# Patient Record
Sex: FEMALE | Race: WHITE | NOT HISPANIC OR LATINO | Employment: UNEMPLOYED | URBAN - METROPOLITAN AREA
[De-identification: names, ages, dates, MRNs, and addresses within clinical notes are randomized per-mention and may not be internally consistent; named-entity substitution may affect disease eponyms.]

---

## 2017-06-26 ENCOUNTER — TRANSCRIBE ORDERS (OUTPATIENT)
Dept: ADMINISTRATIVE | Facility: HOSPITAL | Age: 67
End: 2017-06-26

## 2017-06-26 DIAGNOSIS — M54.16 LUMBAR RADICULOPATHY: Primary | ICD-10-CM

## 2017-06-30 ENCOUNTER — HOSPITAL ENCOUNTER (OUTPATIENT)
Dept: RADIOLOGY | Facility: HOSPITAL | Age: 67
Discharge: HOME | End: 2017-06-30
Attending: INTERNAL MEDICINE
Payer: MEDICARE

## 2017-06-30 DIAGNOSIS — M54.16 LUMBAR RADICULOPATHY: ICD-10-CM

## 2017-06-30 PROCEDURE — 72148 MRI LUMBAR SPINE W/O DYE: CPT

## 2017-08-14 ENCOUNTER — TRANSCRIBE ORDERS (OUTPATIENT)
Dept: ADMINISTRATIVE | Facility: HOSPITAL | Age: 67
End: 2017-08-14

## 2017-08-14 ENCOUNTER — APPOINTMENT (OUTPATIENT)
Dept: LAB | Facility: HOSPITAL | Age: 67
End: 2017-08-14
Attending: INTERNAL MEDICINE
Payer: MEDICARE

## 2017-08-14 DIAGNOSIS — N39.0 URINARY TRACT INFECTION, SITE NOT SPECIFIED: Primary | ICD-10-CM

## 2017-08-14 LAB
BACTERIA UR QL AUTO: ABNORMAL /HPF
BILIRUB UR QL STRIP: NEGATIVE
CLARITY UR: CLEAR
COLOR UR: YELLOW
GLUCOSE UR STRIP-MCNC: NEGATIVE MG/DL
HGB UR QL STRIP.AUTO: ABNORMAL
KETONES UR STRIP-MCNC: NEGATIVE MG/DL
LEUKOCYTE ESTERASE UR QL STRIP: ABNORMAL
NITRITE UR QL STRIP: NEGATIVE
NON-SQ EPI CELLS URNS QL MICRO: ABNORMAL /HPF
PH UR STRIP.AUTO: 5.5 [PH] (ref 5–9)
PROT UR STRIP-MCNC: NEGATIVE MG/DL
RBC #/AREA URNS AUTO: ABNORMAL /HPF
SP GR UR STRIP.AUTO: <=1.005 (ref 1–1.03)
UROBILINOGEN UR QL STRIP.AUTO: 0.2 E.U./DL
WBC #/AREA URNS AUTO: ABNORMAL /HPF

## 2017-08-14 PROCEDURE — 81001 URINALYSIS AUTO W/SCOPE: CPT | Performed by: INTERNAL MEDICINE

## 2018-04-12 ENCOUNTER — HOSPITAL ENCOUNTER (OUTPATIENT)
Dept: RADIOLOGY | Facility: HOSPITAL | Age: 68
Discharge: HOME | End: 2018-04-12
Attending: INTERNAL MEDICINE
Payer: MEDICARE

## 2018-04-12 ENCOUNTER — TRANSCRIBE ORDERS (OUTPATIENT)
Dept: ADMINISTRATIVE | Facility: HOSPITAL | Age: 68
End: 2018-04-12

## 2018-04-12 DIAGNOSIS — M25.519 ACUTE SHOULDER PAIN, UNSPECIFIED LATERALITY: Primary | ICD-10-CM

## 2018-04-12 PROCEDURE — 73030 X-RAY EXAM OF SHOULDER: CPT

## 2018-11-08 ENCOUNTER — TRANSCRIBE ORDERS (OUTPATIENT)
Dept: ADMINISTRATIVE | Facility: HOSPITAL | Age: 68
End: 2018-11-08

## 2018-11-08 DIAGNOSIS — R52 PAIN: Primary | ICD-10-CM

## 2018-11-10 ENCOUNTER — HOSPITAL ENCOUNTER (OUTPATIENT)
Dept: RADIOLOGY | Facility: HOSPITAL | Age: 68
Discharge: HOME | End: 2018-11-10
Attending: INTERNAL MEDICINE
Payer: MEDICARE

## 2018-11-10 DIAGNOSIS — R52 PAIN: ICD-10-CM

## 2018-11-10 PROCEDURE — 76705 ECHO EXAM OF ABDOMEN: CPT

## 2018-12-05 ENCOUNTER — TRANSCRIBE ORDERS (OUTPATIENT)
Dept: ADMINISTRATIVE | Facility: HOSPITAL | Age: 68
End: 2018-12-05

## 2018-12-05 DIAGNOSIS — R10.11 RUQ PAIN: Primary | ICD-10-CM

## 2018-12-11 ENCOUNTER — HOSPITAL ENCOUNTER (OUTPATIENT)
Dept: RADIOLOGY | Facility: HOSPITAL | Age: 68
Discharge: HOME | End: 2018-12-11
Attending: INTERNAL MEDICINE
Payer: MEDICARE

## 2018-12-11 VITALS — WEIGHT: 213 LBS

## 2018-12-11 DIAGNOSIS — R10.11 RUQ PAIN: ICD-10-CM

## 2018-12-11 PROCEDURE — 78227 HEPATOBIL SYST IMAGE W/DRUG: CPT

## 2018-12-11 PROCEDURE — A9537 TC99M MEBROFENIN: HCPCS

## 2019-05-28 ENCOUNTER — TRANSCRIBE ORDERS (OUTPATIENT)
Dept: ADMINISTRATIVE | Facility: HOSPITAL | Age: 69
End: 2019-05-28

## 2019-05-28 DIAGNOSIS — N28.9 RENAL INSUFFICIENCY: ICD-10-CM

## 2019-05-28 DIAGNOSIS — R79.89 ELEVATED SERUM CREATININE: Primary | ICD-10-CM

## 2019-05-30 ENCOUNTER — HOSPITAL ENCOUNTER (OUTPATIENT)
Dept: RADIOLOGY | Facility: HOSPITAL | Age: 69
Discharge: HOME | End: 2019-05-30
Attending: INTERNAL MEDICINE
Payer: MEDICARE

## 2019-05-30 DIAGNOSIS — N28.9 RENAL INSUFFICIENCY: ICD-10-CM

## 2019-05-30 DIAGNOSIS — R79.89 ELEVATED SERUM CREATININE: ICD-10-CM

## 2019-05-30 PROCEDURE — 76770 US EXAM ABDO BACK WALL COMP: CPT

## 2021-03-30 DIAGNOSIS — Z23 ENCOUNTER FOR IMMUNIZATION: ICD-10-CM

## 2021-04-16 ENCOUNTER — IMMUNIZATIONS (OUTPATIENT)
Dept: FAMILY MEDICINE CLINIC | Facility: HOSPITAL | Age: 71
End: 2021-04-16

## 2021-04-16 DIAGNOSIS — Z23 ENCOUNTER FOR IMMUNIZATION: Primary | ICD-10-CM

## 2021-04-16 PROCEDURE — 0011A SARS-COV-2 / COVID-19 MRNA VACCINE (MODERNA) 100 MCG: CPT

## 2021-04-16 PROCEDURE — 91301 SARS-COV-2 / COVID-19 MRNA VACCINE (MODERNA) 100 MCG: CPT

## 2021-05-18 ENCOUNTER — IMMUNIZATIONS (OUTPATIENT)
Dept: FAMILY MEDICINE CLINIC | Facility: HOSPITAL | Age: 71
End: 2021-05-18

## 2021-05-18 DIAGNOSIS — Z23 ENCOUNTER FOR IMMUNIZATION: Primary | ICD-10-CM

## 2021-05-18 PROCEDURE — 0012A SARS-COV-2 / COVID-19 MRNA VACCINE (MODERNA) 100 MCG: CPT

## 2021-05-18 PROCEDURE — 91301 SARS-COV-2 / COVID-19 MRNA VACCINE (MODERNA) 100 MCG: CPT

## 2022-09-06 ENCOUNTER — RA CDI HCC (OUTPATIENT)
Dept: OTHER | Facility: HOSPITAL | Age: 72
End: 2022-09-06

## 2022-09-06 NOTE — PROGRESS NOTES
[address]  coding opportunities       Chart reviewed, no opportunity found: CHART REVIEWED, NO OPPORTUNITY FOUND        Patients Insurance     Medicare Insurance: Medicare

## 2022-09-16 ENCOUNTER — OFFICE VISIT (OUTPATIENT)
Dept: FAMILY MEDICINE CLINIC | Facility: CLINIC | Age: 72
End: 2022-09-16
Payer: MEDICARE

## 2022-09-16 VITALS
OXYGEN SATURATION: 95 % | HEIGHT: 65 IN | WEIGHT: 230 LBS | BODY MASS INDEX: 38.32 KG/M2 | HEART RATE: 76 BPM | SYSTOLIC BLOOD PRESSURE: 130 MMHG | DIASTOLIC BLOOD PRESSURE: 74 MMHG

## 2022-09-16 DIAGNOSIS — T78.40XD ALLERGY, SUBSEQUENT ENCOUNTER: ICD-10-CM

## 2022-09-16 DIAGNOSIS — G40.909 SEIZURE DISORDER (HCC): ICD-10-CM

## 2022-09-16 DIAGNOSIS — K21.9 GASTROESOPHAGEAL REFLUX DISEASE WITHOUT ESOPHAGITIS: Primary | ICD-10-CM

## 2022-09-16 DIAGNOSIS — M25.512 ACUTE PAIN OF LEFT SHOULDER: ICD-10-CM

## 2022-09-16 DIAGNOSIS — E03.9 ACQUIRED HYPOTHYROIDISM: ICD-10-CM

## 2022-09-16 DIAGNOSIS — F41.9 ANXIETY: ICD-10-CM

## 2022-09-16 DIAGNOSIS — G89.4 CHRONIC PAIN SYNDROME: ICD-10-CM

## 2022-09-16 PROBLEM — M79.7 FIBROMYALGIA: Status: ACTIVE | Noted: 2022-09-16

## 2022-09-16 PROBLEM — T78.40XA ALLERGIES: Status: ACTIVE | Noted: 2022-09-16

## 2022-09-16 PROCEDURE — 99214 OFFICE O/P EST MOD 30 MIN: CPT | Performed by: INTERNAL MEDICINE

## 2022-09-16 RX ORDER — PANTOPRAZOLE SODIUM 40 MG/1
40 TABLET, DELAYED RELEASE ORAL DAILY
Qty: 90 TABLET | Refills: 1 | Status: SHIPPED | OUTPATIENT
Start: 2022-09-16

## 2022-09-16 RX ORDER — CHOLECALCIFEROL (VITAMIN D3) 125 MCG
2000 CAPSULE ORAL DAILY
COMMUNITY
Start: 1999-05-07

## 2022-09-16 RX ORDER — LEVETIRACETAM 500 MG/1
500 TABLET ORAL 2 TIMES DAILY
Qty: 180 TABLET | Refills: 1 | Status: SHIPPED | OUTPATIENT
Start: 2022-09-16

## 2022-09-16 RX ORDER — LEVOTHYROXINE SODIUM 112 UG/1
112 TABLET ORAL DAILY
Qty: 90 TABLET | Refills: 1 | Status: SHIPPED | OUTPATIENT
Start: 2022-09-16

## 2022-09-16 RX ORDER — PANTOPRAZOLE SODIUM 40 MG/1
40 TABLET, DELAYED RELEASE ORAL DAILY
COMMUNITY
End: 2022-09-16 | Stop reason: SDUPTHER

## 2022-09-16 RX ORDER — HYDROCODONE BITARTRATE AND ACETAMINOPHEN 5; 325 MG/1; MG/1
1 TABLET ORAL 2 TIMES DAILY PRN
Qty: 60 TABLET | Refills: 0 | Status: SHIPPED | OUTPATIENT
Start: 2022-09-16 | End: 2022-10-18 | Stop reason: SDUPTHER

## 2022-09-16 RX ORDER — HYDROCODONE BITARTRATE AND ACETAMINOPHEN 5; 325 MG/1; MG/1
1 TABLET ORAL 2 TIMES DAILY PRN
COMMUNITY
Start: 2022-09-06 | End: 2022-09-16 | Stop reason: SDUPTHER

## 2022-09-16 RX ORDER — LEVOTHYROXINE SODIUM 112 UG/1
112 TABLET ORAL DAILY
COMMUNITY
Start: 2020-09-12 | End: 2022-09-16 | Stop reason: SDUPTHER

## 2022-09-16 RX ORDER — FLUTICASONE PROPIONATE 50 MCG
2 SPRAY, SUSPENSION (ML) NASAL DAILY
Qty: 15.8 ML | Refills: 1 | Status: SHIPPED | OUTPATIENT
Start: 2022-09-16

## 2022-09-16 RX ORDER — FLUTICASONE PROPIONATE 50 MCG
1 SPRAY, SUSPENSION (ML) NASAL DAILY
COMMUNITY
End: 2022-09-16 | Stop reason: SDUPTHER

## 2022-09-16 RX ORDER — MONTELUKAST SODIUM 10 MG/1
10 TABLET ORAL DAILY
Qty: 90 TABLET | Refills: 1 | Status: SHIPPED | OUTPATIENT
Start: 2022-09-16

## 2022-09-16 RX ORDER — MONTELUKAST SODIUM 10 MG/1
10 TABLET ORAL DAILY
COMMUNITY
Start: 1999-08-06 | End: 2022-09-16 | Stop reason: SDUPTHER

## 2022-09-16 RX ORDER — DULOXETIN HYDROCHLORIDE 30 MG/1
30 CAPSULE, DELAYED RELEASE ORAL DAILY
COMMUNITY
Start: 2022-06-21 | End: 2022-09-16 | Stop reason: SDUPTHER

## 2022-09-16 RX ORDER — LEVETIRACETAM 500 MG/1
500 TABLET ORAL 2 TIMES DAILY
COMMUNITY
Start: 1999-06-01 | End: 2022-09-16 | Stop reason: SDUPTHER

## 2022-09-16 RX ORDER — DULOXETIN HYDROCHLORIDE 30 MG/1
30 CAPSULE, DELAYED RELEASE ORAL DAILY
Qty: 90 CAPSULE | Refills: 1 | Status: SHIPPED | OUTPATIENT
Start: 2022-09-16

## 2022-09-16 NOTE — ASSESSMENT & PLAN NOTE
Patient has hypothyroidism on levothyroxine 112 mcg daily in the morning    Her last TSH is in the normal range will continue with the same dose for now

## 2022-09-16 NOTE — ASSESSMENT & PLAN NOTE
History of seizure disorder on Keppra 500 mg twice a day    No episodes of seizures in the recent past   Will continue with the same

## 2022-09-16 NOTE — ASSESSMENT & PLAN NOTE
Patient with chronic pain low back area  Was seen by the spine surgeons in the past had received steroid injections also  Currently on pain medication Norco twice a day p r n  for severe pain    Will continue with the same

## 2022-09-16 NOTE — ASSESSMENT & PLAN NOTE
Patient with allergy symptoms currently on Singulair 10 mg daily and Flonase 2 sprays each nostril once a day will continue with the same

## 2022-09-16 NOTE — PROGRESS NOTES
Office Visit Note  22     Shaq Odonnell 71 y o  female MRN: [id number]  : 1950    Assessment:     1  Gastroesophageal reflux disease without esophagitis  Assessment & Plan:  Patient with GERD on pantoprazole 40 mg daily  When she does not take the medication she has increasing GERD symptoms not able to go off or taper down the medication  Will continue with the same    Orders:  -     pantoprazole (PROTONIX) 40 mg tablet; Take 1 tablet (40 mg total) by mouth daily    2  Acquired hypothyroidism  Assessment & Plan:  Patient has hypothyroidism on levothyroxine 112 mcg daily in the morning  Her last TSH is in the normal range will continue with the same dose for now    Orders:  -     levothyroxine 112 mcg tablet; Take 1 tablet (112 mcg total) by mouth in the morning    3  Seizure disorder [facility])  Assessment & Plan:  History of seizure disorder on Keppra 500 mg twice a day  No episodes of seizures in the recent past   Will continue with the same    Orders:  -     levETIRAcetam (KEPPRA) 500 mg tablet; Take 1 tablet (500 mg total) by mouth 2 (two) times a day    4  Allergy, subsequent encounter  -     fluticasone (FLONASE) 50 mcg/act nasal spray; 2 sprays into each nostril daily  -     montelukast (SINGULAIR) 10 mg tablet; Take 1 tablet (10 mg total) by mouth in the morning    5  Chronic pain syndrome  Assessment & Plan:  Patient with chronic pain low back area  Was seen by the spine surgeons in the past had received steroid injections also  Currently on pain medication Norco twice a day p r n  for severe pain  Will continue with the same    Orders:  -     HYDROcodone-acetaminophen (NORCO) 5-325 mg per tablet; Take 1 tablet by mouth 2 (two) times a day as needed for pain Max Daily Amount: 2 tablets    6   Anxiety  Assessment & Plan:  Patient with allergy symptoms currently on Singulair 10 mg daily and Flonase 2 sprays each nostril once a day will continue with the same     Orders:  -     DULoxetine (CYMBALTA) 30 mg delayed release capsule; Take 1 capsule (30 mg total) by mouth in the morning    7  Acute pain of left shoulder  Assessment & Plan:  Patient complains of pain in the left shoulder area in the anterior aspect especially with movements  She had a history of fall few weeks ago  Patient denies any tingling numbness sensation in the upper extremity on the left side she does complain of pain in the Arm area  Will get an x-ray of the left shoulder  Avoiding nonsteroidals as she had adverse reaction to the medication meloxicam in the past   Patient is taking hydrocodone p r n  for her low back pain joint pains    Orders:  -     XR shoulder 2+ vw left; Future; Expected date: 09/16/2022        Discussion Summary and Plan: Today's care plan and medications were reviewed with patient in detail and all their questions answered to their satisfaction  Chief Complaint   Patient presents with    Follow-up      Subjective:  Patient is here for left shoulder pain in the anterior aspect especially with movements of the shoulder  History of injury few weeks back  Patient with multiple other medical problems including seizure disorder allergies hypothyroidism  Also history of GERD  Reviewed all the medications with the patient      The following portions of the patient's history were reviewed and updated as appropriate: allergies, current medications, past family history, past medical history, past social history, past surgical history and problem list     Review of Systems   Constitutional: Negative for chills and fever  HENT: Negative for ear pain and sore throat  Eyes: Negative for pain and visual disturbance  Respiratory: Negative for cough and shortness of breath  Cardiovascular: Negative for chest pain and palpitations  Gastrointestinal: Negative for abdominal pain and vomiting  Genitourinary: Negative for dysuria and hematuria  Musculoskeletal: Positive for arthralgias   Negative for back pain and gait problem  Pain left shoulder   Skin: Negative  Negative for color change and rash  Neurological: Negative for seizures and syncope  Psychiatric/Behavioral: Negative  All other systems reviewed and are negative          Historical Information   Patient Active Problem List   Diagnosis    Chronic pain syndrome    Acquired hypothyroidism    Seizure disorder (HCC)    Gastroesophageal reflux disease without esophagitis    Allergies    Fibromyalgia    Anxiety    Acute pain of left shoulder     Past Medical History:   Diagnosis Date    Anxiety with depression     Arm fracture, left     x2    Arthritis     Degenerative joint disease     Fibromyalgia     GERD (gastroesophageal reflux disease)     Hypertension     Hypothyroidism     Kidney stones     Low back pain     Seasonal allergies     Seizure disorder (HCC)      Past Surgical History:   Procedure Laterality Date    COLONOSCOPY      DILATION AND CURETTAGE OF UTERUS      NECK SURGERY      Plate    ORIF FOREARM FRACTURE Left     SHOULDER SURGERY      x2    TONSILLECTOMY       Social History     Substance and Sexual Activity   Alcohol Use Yes    Comment: Socially/very rare     Social History     Substance and Sexual Activity   Drug Use Never     Social History     Tobacco Use   Smoking Status Former Smoker    Quit date: 200    Years since quittin 7   Smokeless Tobacco Not on file     Family History   Problem Relation Age of Onset    Stroke Mother     Heart disease Father      Health Maintenance Due   Topic    Hepatitis C Screening     Medicare Annual Wellness Visit (AWV)     Depression Screening     BMI: Followup Plan     Breast Cancer Screening: Mammogram     Colorectal Cancer Screening     Osteoporosis Screening     Fall Risk     Urinary Incontinence Screening     Pneumococcal Vaccine: 65+ Years (1 - PCV)    COVID-19 Vaccine (3 - Booster for Moderna series)    Influenza Vaccine (1) Meds/Allergies       Current Outpatient Medications:     Cholecalciferol (Vitamin D3) 50 MCG (2000 UT) TABS, Take 2,000 Units by mouth in the morning, Disp: , Rfl:     DULoxetine (CYMBALTA) 30 mg delayed release capsule, Take 1 capsule (30 mg total) by mouth in the morning, Disp: 90 capsule, Rfl: 1    fluticasone (FLONASE) 50 mcg/act nasal spray, 2 sprays into each nostril daily, Disp: 15 8 mL, Rfl: 1    HYDROcodone-acetaminophen (NORCO) 5-325 mg per tablet, Take 1 tablet by mouth 2 (two) times a day as needed for pain Max Daily Amount: 2 tablets, Disp: 60 tablet, Rfl: 0    levETIRAcetam (KEPPRA) 500 mg tablet, Take 1 tablet (500 mg total) by mouth 2 (two) times a day, Disp: 180 tablet, Rfl: 1    levothyroxine 112 mcg tablet, Take 1 tablet (112 mcg total) by mouth in the morning, Disp: 90 tablet, Rfl: 1    montelukast (SINGULAIR) 10 mg tablet, Take 1 tablet (10 mg total) by mouth in the morning, Disp: 90 tablet, Rfl: 1    pantoprazole (PROTONIX) 40 mg tablet, Take 1 tablet (40 mg total) by mouth daily, Disp: 90 tablet, Rfl: 1      Objective:    Vitals:   /74 (BP Location: Right arm, Patient Position: Sitting, Cuff Size: Large)   Pulse 76   Ht 5' 5" (1 651 m)   Wt 104 kg (230 lb)   SpO2 95%   BMI 38 27 kg/m²   Body mass index is 38 27 kg/m²  Vitals:    09/16/22 1255   Weight: 104 kg (230 lb)       Physical Exam  Vitals and nursing note reviewed  Constitutional:       Appearance: She is obese  Cardiovascular:      Rate and Rhythm: Normal rate and regular rhythm  Heart sounds: Normal heart sounds  Pulmonary:      Effort: Pulmonary effort is normal       Breath sounds: Normal breath sounds  Abdominal:      General: Abdomen is flat  Palpations: Abdomen is soft  Musculoskeletal:      Cervical back: Normal range of motion and neck supple  Right lower leg: No edema  Left lower leg: No edema        Comments: Tenderness present in the anterior aspect of the shoulder, movements are painful  Skin:     General: Skin is warm and dry  Neurological:      Mental Status: She is alert and oriented to person, place, and time  Lab Review   No visits with results within 2 Month(s) from this visit  Latest known visit with results is:   Transcribe Orders on 08/14/2017   Component Date Value Ref Range Status    Color, UA 08/14/2017 Yellow   Final    Clarity, UA 08/14/2017 Clear   Final    Specific Gravity, UA 08/14/2017 <=1 005  1 000 - 1 030 Final    pH, UA 08/14/2017 5 5  5 0 - 9 0 Final    Leukocytes, UA 08/14/2017 Trace (A) Negative Final    Nitrite, UA 08/14/2017 Negative  Negative Final    Protein, UA 08/14/2017 Negative  Negative mg/dl Final    Glucose, UA 08/14/2017 Negative  Negative mg/dl Final    Ketones, UA 08/14/2017 Negative  Negative mg/dl Final    Urobilinogen, UA 08/14/2017 0 2  0 2, 1 0 E U /dl E U /dl Final    Bilirubin, UA 08/14/2017 Negative  Negative Final    Occult Blood, UA 08/14/2017 Small (A) Negative Final    RBC, UA 08/14/2017 0-1 (A) None Seen /hpf Final    WBC, UA 08/14/2017 4-10 (A) None Seen /hpf Final    Epithelial Cells 08/14/2017 Occasional  None Seen, Occasional /hpf Final    Bacteria, UA 08/14/2017 Occasional  None Seen, Occasional /hpf Final         Agustín Guzman MD        "This note has been constructed using a voice recognition system  Therefore there may be syntax, spelling, and/or grammatical errors   Please call if you have any questions  "

## 2022-09-16 NOTE — ASSESSMENT & PLAN NOTE
Patient with GERD on pantoprazole 40 mg daily  When she does not take the medication she has increasing GERD symptoms not able to go off or taper down the medication    Will continue with the same

## 2022-09-16 NOTE — ASSESSMENT & PLAN NOTE
Patient complains of pain in the left shoulder area in the anterior aspect especially with movements  She had a history of fall few weeks ago  Patient denies any tingling numbness sensation in the upper extremity on the left side she does complain of pain in the Arm area  Will get an x-ray of the left shoulder    Avoiding nonsteroidals as she had adverse reaction to the medication meloxicam in the past   Patient is taking hydrocodone p r n  for her low back pain joint pains

## 2022-09-29 ENCOUNTER — HOSPITAL ENCOUNTER (OUTPATIENT)
Dept: RADIOLOGY | Facility: HOSPITAL | Age: 72
Discharge: HOME | End: 2022-09-29
Payer: MEDICARE

## 2022-09-29 DIAGNOSIS — M25.512 ACUTE PAIN OF LEFT SHOULDER: ICD-10-CM

## 2022-09-29 PROCEDURE — 73030 X-RAY EXAM OF SHOULDER: CPT

## 2022-10-18 ENCOUNTER — OFFICE VISIT (OUTPATIENT)
Dept: FAMILY MEDICINE CLINIC | Facility: CLINIC | Age: 72
End: 2022-10-18
Payer: MEDICARE

## 2022-10-18 VITALS
SYSTOLIC BLOOD PRESSURE: 126 MMHG | WEIGHT: 231 LBS | DIASTOLIC BLOOD PRESSURE: 76 MMHG | HEIGHT: 65 IN | HEART RATE: 78 BPM | OXYGEN SATURATION: 95 % | BODY MASS INDEX: 38.49 KG/M2

## 2022-10-18 DIAGNOSIS — E03.9 ACQUIRED HYPOTHYROIDISM: ICD-10-CM

## 2022-10-18 DIAGNOSIS — F41.9 ANXIETY: ICD-10-CM

## 2022-10-18 DIAGNOSIS — G89.4 CHRONIC PAIN SYNDROME: ICD-10-CM

## 2022-10-18 DIAGNOSIS — M25.512 ACUTE PAIN OF LEFT SHOULDER: ICD-10-CM

## 2022-10-18 DIAGNOSIS — K21.9 GASTROESOPHAGEAL REFLUX DISEASE WITHOUT ESOPHAGITIS: ICD-10-CM

## 2022-10-18 DIAGNOSIS — G40.909 SEIZURE DISORDER (HCC): ICD-10-CM

## 2022-10-18 DIAGNOSIS — Z23 ENCOUNTER FOR IMMUNIZATION: Primary | ICD-10-CM

## 2022-10-18 DIAGNOSIS — M79.7 FIBROMYALGIA: ICD-10-CM

## 2022-10-18 PROCEDURE — G0008 ADMIN INFLUENZA VIRUS VAC: HCPCS | Performed by: INTERNAL MEDICINE

## 2022-10-18 PROCEDURE — 90662 IIV NO PRSV INCREASED AG IM: CPT | Performed by: INTERNAL MEDICINE

## 2022-10-18 PROCEDURE — 99213 OFFICE O/P EST LOW 20 MIN: CPT | Performed by: INTERNAL MEDICINE

## 2022-10-18 RX ORDER — HYDROCODONE BITARTRATE AND ACETAMINOPHEN 5; 325 MG/1; MG/1
1 TABLET ORAL 2 TIMES DAILY PRN
Qty: 60 TABLET | Refills: 0 | Status: SHIPPED | OUTPATIENT
Start: 2022-10-18

## 2022-10-18 NOTE — PROGRESS NOTES
Office Visit Note  10/18/22     Oscar Noe 71 y o  female MRN: [id number]  : 1950    Assessment:     1  Encounter for immunization  -     influenza vaccine, high-dose, PF 0 5 mL    2  Chronic pain syndrome  Assessment & Plan:  Patient with chronic low back pain currently taking hydrocodone 5/325 2 times a day as needed will continue with the same  Orders:  -     HYDROcodone-acetaminophen (NORCO) 5-325 mg per tablet; Take 1 tablet by mouth 2 (two) times a day as needed for pain Max Daily Amount: 2 tablets    3  Anxiety    4  Acquired hypothyroidism  Assessment & Plan:  Patient with hypothyroidism currently taking levothyroxine 112 mcg daily will continue with same last TSH level was normal      5  Gastroesophageal reflux disease without esophagitis    6  Seizure disorder ([facility] [address] )    7  Acute pain of left shoulder  Assessment & Plan:  Patient with the left shoulder pain secondary to fall x-rays have not revealed any significant findings the pain got better weights of will monitor movements of the shoulder causing mild discomfort otherwise unremarkable      8  Fibromyalgia  Assessment & Plan:  Patient with fibromyalgia currently taking Cymbalta will continue with the same it also helps with her mood          Discussion Summary and Plan: Today's care plan and medications were reviewed with patient in detail and all their questions answered to their satisfaction  Chief Complaint   Patient presents with   • Follow-up      Subjective:  Patient has come in here for a follow-up evaluation regarding her chronic pain syndrome and also she has been having left shoulder pain secondary to the fall which got better by itself  Patient also has been noticing her nails are getting with very brittle currently taking zinc tablets  Patient is also going to take vitamin B12 and iron pills which had recommended her to take 3 times a week    Patient with chronic pain syndrome on Norco       The following portions of the patient's history were reviewed and updated as appropriate: allergies, current medications, past family history, past medical history, past social history, past surgical history and problem list     Review of Systems   Constitutional: Negative for chills and fever  HENT: Negative for ear pain and sore throat  Eyes: Negative for pain and visual disturbance  Respiratory: Negative for cough and shortness of breath  Cardiovascular: Negative for chest pain and palpitations  Gastrointestinal: Negative for abdominal pain and vomiting  Genitourinary: Negative for dysuria and hematuria  Musculoskeletal: Positive for arthralgias and back pain  Skin: Negative for color change and rash  Neurological: Negative for seizures and syncope  All other systems reviewed and are negative          Historical Information   Patient Active Problem List   Diagnosis   • Chronic pain syndrome   • Acquired hypothyroidism   • Seizure disorder ([facility] )   • Gastroesophageal reflux disease without esophagitis   • Allergies   • Fibromyalgia   • Anxiety   • Acute pain of left shoulder   • Migraines   • Back pain due to inflammatory process   • Encounter for immunization     Past Medical History:   Diagnosis Date   • Anxiety with depression    • Arm fracture, left     x2   • Arthritis    • Degenerative joint disease    • Fibromyalgia    • GERD (gastroesophageal reflux disease)    • Hypertension    • Hypothyroidism    • Kidney stones    • Low back pain    • Seasonal allergies    • Seizure disorder ([facility] )      Past Surgical History:   Procedure Laterality Date   • COLONOSCOPY     • DILATION AND CURETTAGE OF UTERUS     • NECK SURGERY      Plate   • ORIF FOREARM FRACTURE Left    • SHOULDER SURGERY      x2   • TONSILLECTOMY       Social History     Substance and Sexual Activity   Alcohol Use Yes    Comment: Socially/very rare     Social History     Substance and Sexual Activity   Drug Use Never     Social History     Tobacco Use   Smoking Status Former Smoker   • Quit date:    • Years since quittin 8   Smokeless Tobacco Never Used     Family History   Problem Relation Age of Onset   • Stroke Mother    • Heart disease Father      Health Maintenance Due   Topic   • Hepatitis C Screening    • Medicare Annual Wellness Visit (AWV)    • Depression Screening    • BMI: Followup Plan    • Breast Cancer Screening: Mammogram    • Colorectal Cancer Screening    • Osteoporosis Screening    • Fall Risk    • Urinary Incontinence Screening    • Pneumococcal Vaccine: 65+ Years (1 - PCV)   • COVID-19 Vaccine (3 - Booster for Moderna series)      Meds/Allergies       Current Outpatient Medications:   •  Cholecalciferol (Vitamin D3) 50 MCG (2000 UT) TABS, Take 2,000 Units by mouth in the morning, Disp: , Rfl:   •  DULoxetine (CYMBALTA) 30 mg delayed release capsule, Take 1 capsule (30 mg total) by mouth in the morning, Disp: 90 capsule, Rfl: 1  •  fluticasone (FLONASE) 50 mcg/act nasal spray, 2 sprays into each nostril daily, Disp: 15 8 mL, Rfl: 1  •  HYDROcodone-acetaminophen (NORCO) 5-325 mg per tablet, Take 1 tablet by mouth 2 (two) times a day as needed for pain Max Daily Amount: 2 tablets, Disp: 60 tablet, Rfl: 0  •  levETIRAcetam (KEPPRA) 500 mg tablet, Take 1 tablet (500 mg total) by mouth 2 (two) times a day, Disp: 180 tablet, Rfl: 1  •  levothyroxine 112 mcg tablet, Take 1 tablet (112 mcg total) by mouth in the morning, Disp: 90 tablet, Rfl: 1  •  montelukast (SINGULAIR) 10 mg tablet, Take 1 tablet (10 mg total) by mouth in the morning, Disp: 90 tablet, Rfl: 1  •  pantoprazole (PROTONIX) 40 mg tablet, Take 1 tablet (40 mg total) by mouth daily, Disp: 90 tablet, Rfl: 1      Objective:    Vitals:   /76 (BP Location: Right arm, Patient Position: Sitting, Cuff Size: Large)   Pulse 78   Ht 5' 5" (1 651 m)   Wt 105 kg (231 lb)   SpO2 95%   BMI 38 44 kg/m²   Body mass index is 38 44 kg/m²    Vitals:    10/18/22 1144   Weight: 105 kg (231 lb) Physical Exam  Vitals and nursing note reviewed  Constitutional:       Appearance: Normal appearance  Cardiovascular:      Rate and Rhythm: Normal rate and regular rhythm  Heart sounds: Normal heart sounds  Pulmonary:      Effort: Pulmonary effort is normal       Breath sounds: Normal breath sounds  Abdominal:      General: Abdomen is flat  Palpations: Abdomen is soft  Musculoskeletal:      Cervical back: Normal range of motion and neck supple  Right lower leg: No edema  Left lower leg: No edema  Comments: SLR about 30°   Neurological:      Mental Status: She is alert and oriented to person, place, and time  Lab Review   No visits with results within 2 Month(s) from this visit  Latest known visit with results is:   Transcribe Orders on 08/14/2017   Component Date Value Ref Range Status   • Color, UA 08/14/2017 Yellow   Final   • Clarity, UA 08/14/2017 Clear   Final   • Specific Gravity, UA 08/14/2017 <=1 005  1 000 - 1 030 Final   • pH, UA 08/14/2017 5 5  5 0 - 9 0 Final   • Leukocytes, UA 08/14/2017 Trace (A) Negative Final   • Nitrite, UA 08/14/2017 Negative  Negative Final   • Protein, UA 08/14/2017 Negative  Negative mg/dl Final   • Glucose, UA 08/14/2017 Negative  Negative mg/dl Final   • Ketones, UA 08/14/2017 Negative  Negative mg/dl Final   • Urobilinogen, UA 08/14/2017 0 2  0 2, 1 0 E U /dl E U /dl Final   • Bilirubin, UA 08/14/2017 Negative  Negative Final   • Occult Blood, UA 08/14/2017 Small (A) Negative Final   • RBC, UA 08/14/2017 0-1 (A) None Seen /hpf Final   • WBC, UA 08/14/2017 4-10 (A) None Seen /hpf Final   • Epithelial Cells 08/14/2017 Occasional  None Seen, Occasional /hpf Final   • Bacteria, UA 08/14/2017 Occasional  None Seen, Occasional /hpf Final         [employer] [place]        "This note has been constructed using a voice recognition system  Therefore there may be syntax, spelling, and/or grammatical errors   Please call if you have any questions  "

## 2022-10-18 NOTE — ASSESSMENT & PLAN NOTE
Patient with chronic low back pain currently taking hydrocodone 5/325 2 times a day as needed will continue with the same 
Patient with fibromyalgia currently taking Cymbalta will continue with the same it also helps with her mood
Patient with hypothyroidism currently taking levothyroxine 112 mcg daily will continue with same last TSH level was normal
Patient with the left shoulder pain secondary to fall x-rays have not revealed any significant findings the pain got better weights of will monitor movements of the shoulder causing mild discomfort otherwise unremarkable
Hugo infant of 40 completed weeks of gestation

## 2022-11-09 ENCOUNTER — RA CDI HCC (OUTPATIENT)
Dept: OTHER | Facility: HOSPITAL | Age: 72
End: 2022-11-09

## 2022-11-09 NOTE — PROGRESS NOTES
[id number]  [facility]  coding opportunities          Chart Reviewed number of suggestions sent to Provider: 1     Patients Insurance     Medicare Insurance: [employer]

## 2022-11-16 ENCOUNTER — DOCUMENTATION (OUTPATIENT)
Dept: FAMILY MEDICINE CLINIC | Facility: CLINIC | Age: 72
End: 2022-11-16

## 2022-11-16 ENCOUNTER — OFFICE VISIT (OUTPATIENT)
Dept: FAMILY MEDICINE CLINIC | Facility: CLINIC | Age: 72
End: 2022-11-16

## 2022-11-16 VITALS
DIASTOLIC BLOOD PRESSURE: 84 MMHG | HEART RATE: 75 BPM | BODY MASS INDEX: 38.49 KG/M2 | OXYGEN SATURATION: 95 % | HEIGHT: 65 IN | WEIGHT: 231 LBS | SYSTOLIC BLOOD PRESSURE: 132 MMHG

## 2022-11-16 DIAGNOSIS — G89.4 CHRONIC PAIN SYNDROME: Primary | ICD-10-CM

## 2022-11-16 DIAGNOSIS — E03.9 ACQUIRED HYPOTHYROIDISM: ICD-10-CM

## 2022-11-16 DIAGNOSIS — G40.909 SEIZURE DISORDER (HCC): ICD-10-CM

## 2022-11-16 DIAGNOSIS — K21.9 GASTROESOPHAGEAL REFLUX DISEASE WITHOUT ESOPHAGITIS: ICD-10-CM

## 2022-11-16 DIAGNOSIS — M79.7 FIBROMYALGIA: ICD-10-CM

## 2022-11-16 NOTE — ASSESSMENT & PLAN NOTE
Patient on pantoprazole for GERD not able to taper it down taking 40 mg daily    Patient to have upper and lower endoscopy done in few months from now and follow-up with the gastroenterologist

## 2022-11-16 NOTE — ASSESSMENT & PLAN NOTE
Patient with low back pain chronic currently on hydrocodone 5-325 b i d  p r n  for severe pain will continue the same

## 2022-11-16 NOTE — PROGRESS NOTES
Office Visit Note  22     Allie Ferreira 71 y o  female MRN: [id number]  : 1950    Assessment:     1  Chronic pain syndrome  Assessment & Plan:  Patient with low back pain chronic currently on hydrocodone 5-325 b i d  p r n  for severe pain will continue the same  2  Acquired hypothyroidism  Assessment & Plan:  Patient on levothyroxine 112 mcg lost TSH was normal will continue with the same dose      3  Seizure disorder [facility])  Assessment & Plan:  Patient is on Keppra 500 mg twice a day no episodes of any seizures in the recent past       4  Gastroesophageal reflux disease without esophagitis  Assessment & Plan:  Patient on pantoprazole for GERD not able to taper it down taking 40 mg daily  Patient to have upper and lower endoscopy done in few months from now and follow-up with the gastroenterologist       5  Fibromyalgia  Assessment & Plan: Will continue with the Cymbalta for the fibromyalgia  Discussion Summary and Plan: Today's care plan and medications were reviewed with patient in detail and all their questions answered to their satisfaction  Chief Complaint   Patient presents with   • Follow-up      Subjective:  Patient is coming for a follow-up evaluation she has a history of chronic pain syndrome low back pain  The following portions of the patient's history were reviewed and updated as appropriate: allergies, current medications, past family history, past medical history, past social history, past surgical history and problem list     Review of Systems   Constitutional: Negative for chills and fever  HENT: Negative for ear pain and sore throat  Eyes: Negative for pain and visual disturbance  Respiratory: Negative for cough and shortness of breath  Cardiovascular: Negative for chest pain and palpitations  Gastrointestinal: Negative for abdominal pain and vomiting  Genitourinary: Negative for dysuria and hematuria     Musculoskeletal: Positive for arthralgias and back pain  Skin: Negative for color change and rash  Neurological: Negative for seizures and syncope  All other systems reviewed and are negative          Historical Information   Patient Active Problem List   Diagnosis   • Chronic pain syndrome   • Acquired hypothyroidism   • Seizure disorder ([address] )   • Gastroesophageal reflux disease without esophagitis   • Allergies   • Fibromyalgia   • Anxiety   • Acute pain of left shoulder   • Migraines   • Back pain due to inflammatory process   • Encounter for immunization     Past Medical History:   Diagnosis Date   • Anxiety with depression    • Arm fracture, left     x2   • Arthritis    • Degenerative joint disease    • Fibromyalgia    • GERD (gastroesophageal reflux disease)    • Hypertension    • Hypothyroidism    • Kidney stones    • Low back pain    • Seasonal allergies    • Seizure disorder (HCC)      Past Surgical History:   Procedure Laterality Date   • COLONOSCOPY     • DILATION AND CURETTAGE OF UTERUS     • NECK SURGERY      Plate   • ORIF FOREARM FRACTURE Left    • SHOULDER SURGERY      x2   • TONSILLECTOMY       Social History     Substance and Sexual Activity   Alcohol Use Yes    Comment: Socially/very rare     Social History     Substance and Sexual Activity   Drug Use Never     Social History     Tobacco Use   Smoking Status Former   • Types: Cigarettes   • Quit date:    • Years since quittin 8   Smokeless Tobacco Never     Family History   Problem Relation Age of Onset   • Stroke Mother    • Heart disease Father      Health Maintenance Due   Topic   • Hepatitis C Screening    • Medicare Annual Wellness Visit (AWV)    • Hepatitis B Vaccine (1 of 3 - 3-dose series)   • Depression Screening    • BMI: Followup Plan    • Breast Cancer Screening: Mammogram    • Colorectal Cancer Screening    • Osteoporosis Screening    • Fall Risk    • Urinary Incontinence Screening    • Pneumococcal Vaccine: 65+ Years (1 - PCV)   • COVID-19 Vaccine (3 - Booster for [employer] series)      Meds/Allergies       Current Outpatient Medications:   •  Cholecalciferol (Vitamin D3) 50 MCG (2000 UT) TABS, Take 2,000 Units by mouth in the morning, Disp: , Rfl:   •  DULoxetine (CYMBALTA) 30 mg delayed release capsule, Take 1 capsule (30 mg total) by mouth in the morning, Disp: 90 capsule, Rfl: 1  •  fluticasone (FLONASE) 50 mcg/act nasal spray, 2 sprays into each nostril daily, Disp: 15 8 mL, Rfl: 1  •  HYDROcodone-acetaminophen (NORCO) 5-325 mg per tablet, Take 1 tablet by mouth 2 (two) times a day as needed for pain Max Daily Amount: 2 tablets, Disp: 60 tablet, Rfl: 0  •  levETIRAcetam (KEPPRA) 500 mg tablet, Take 1 tablet (500 mg total) by mouth 2 (two) times a day, Disp: 180 tablet, Rfl: 1  •  levothyroxine 112 mcg tablet, Take 1 tablet (112 mcg total) by mouth in the morning, Disp: 90 tablet, Rfl: 1  •  montelukast (SINGULAIR) 10 mg tablet, Take 1 tablet (10 mg total) by mouth in the morning, Disp: 90 tablet, Rfl: 1  •  pantoprazole (PROTONIX) 40 mg tablet, Take 1 tablet (40 mg total) by mouth daily, Disp: 90 tablet, Rfl: 1      Objective:    Vitals:   /84 (BP Location: Right arm, Patient Position: Sitting, Cuff Size: Standard)   Pulse 75   Ht 5' 5" (1 651 m)   Wt 105 kg (231 lb)   SpO2 95%   BMI 38 44 kg/m²   Body mass index is 38 44 kg/m²  Vitals:    11/16/22 1030   Weight: 105 kg (231 lb)       Physical Exam  Vitals and nursing note reviewed  Constitutional:       Appearance: Normal appearance  Cardiovascular:      Rate and Rhythm: Normal rate and regular rhythm  Heart sounds: Normal heart sounds  Pulmonary:      Effort: Pulmonary effort is normal       Breath sounds: Normal breath sounds  Abdominal:      General: Abdomen is flat  Palpations: Abdomen is soft  Musculoskeletal:      Cervical back: Normal range of motion and neck supple  Right lower leg: No edema  Left lower leg: No edema        Comments: SLR 30° both sides Neurological:      Mental Status: She is alert  Lab Review   No visits with results within 2 Month(s) from this visit  Latest known visit with results is:   Transcribe Orders on 08/14/2017   Component Date Value Ref Range Status   • Color, UA 08/14/2017 Yellow   Final   • Clarity, UA 08/14/2017 Clear   Final   • Specific Gravity, UA 08/14/2017 <=1 005  1 000 - 1 030 Final   • pH, UA 08/14/2017 5 5  5 0 - 9 0 Final   • Leukocytes, UA 08/14/2017 Trace (A)  Negative Final   • Nitrite, UA 08/14/2017 Negative  Negative Final   • Protein, UA 08/14/2017 Negative  Negative mg/dl Final   • Glucose, UA 08/14/2017 Negative  Negative mg/dl Final   • Ketones, UA 08/14/2017 Negative  Negative mg/dl Final   • Urobilinogen, UA 08/14/2017 0 2  0 2, 1 0 E U /dl E U /dl Final   • Bilirubin, UA 08/14/2017 Negative  Negative Final   • Occult Blood, UA 08/14/2017 Small (A)  Negative Final   • RBC, UA 08/14/2017 0-1 (A)  None Seen /hpf Final   • WBC, UA 08/14/2017 4-10 (A)  None Seen /hpf Final   • Epithelial Cells 08/14/2017 Occasional  None Seen, Occasional /hpf Final   • Bacteria, UA 08/14/2017 Occasional  None Seen, Occasional /hpf Final         Rossy Willingham MD        "This note has been constructed using a voice recognition system  Therefore there may be syntax, spelling, and/or grammatical errors   Please call if you have any questions  "

## 2022-11-20 ENCOUNTER — RA CDI HCC (OUTPATIENT)
Dept: OTHER | Facility: HOSPITAL | Age: 72
End: 2022-11-20

## 2022-11-20 NOTE — PROGRESS NOTES
[id number]  [facility]  coding opportunities          Chart Reviewed number of suggestions sent to Provider: 1     Patients Insurance     Medicare Insurance: [employer]

## 2022-11-30 ENCOUNTER — OFFICE VISIT (OUTPATIENT)
Dept: FAMILY MEDICINE CLINIC | Facility: CLINIC | Age: 72
End: 2022-11-30

## 2022-11-30 VITALS
WEIGHT: 230 LBS | BODY MASS INDEX: 38.32 KG/M2 | HEART RATE: 79 BPM | OXYGEN SATURATION: 95 % | SYSTOLIC BLOOD PRESSURE: 130 MMHG | HEIGHT: 65 IN | DIASTOLIC BLOOD PRESSURE: 80 MMHG

## 2022-11-30 DIAGNOSIS — M79.7 FIBROMYALGIA: ICD-10-CM

## 2022-11-30 DIAGNOSIS — G89.4 CHRONIC PAIN SYNDROME: ICD-10-CM

## 2022-11-30 DIAGNOSIS — E03.9 ACQUIRED HYPOTHYROIDISM: Primary | ICD-10-CM

## 2022-11-30 DIAGNOSIS — G40.909 SEIZURE DISORDER (HCC): ICD-10-CM

## 2022-11-30 RX ORDER — HYDROCODONE BITARTRATE AND ACETAMINOPHEN 5; 325 MG/1; MG/1
1 TABLET ORAL 2 TIMES DAILY PRN
Qty: 60 TABLET | Refills: 0 | Status: SHIPPED | OUTPATIENT
Start: 2022-11-30

## 2022-11-30 RX ORDER — LEVOTHYROXINE SODIUM 0.1 MG/1
100 TABLET ORAL DAILY
Qty: 30 TABLET | Refills: 2 | Status: SHIPPED | OUTPATIENT
Start: 2022-11-30

## 2022-11-30 RX ORDER — LEVOTHYROXINE SODIUM 0.1 MG/1
100 TABLET ORAL DAILY
Qty: 30 TABLET | Refills: 2 | Status: SHIPPED | OUTPATIENT
Start: 2022-11-30 | End: 2022-11-30 | Stop reason: SDUPTHER

## 2022-11-30 NOTE — ASSESSMENT & PLAN NOTE
Patient with chronic pain syndrome currently taking hydrocodone twice a day for severe pain will continue with the same

## 2022-11-30 NOTE — ASSESSMENT & PLAN NOTE
Patient currently taking 112 mcg daily since her symptoms with sensation of hot feeling in the body she feels she may need to go down on the dosage of the medication we will try her on 100 mcg daily and follow up with repeat lab in couple of months time

## 2022-11-30 NOTE — PROGRESS NOTES
Office Visit Note  22     Frandy Kidd 71 y o  female MRN: [id number]  : 1950    Assessment:     1  Acquired hypothyroidism  Assessment & Plan:  Patient currently taking 112 mcg daily since her symptoms with sensation of hot feeling in the body she feels she may need to go down on the dosage of the medication we will try her on 100 mcg daily and follow up with repeat lab in couple of months time  2  Chronic pain syndrome  Assessment & Plan:  Patient with chronic pain syndrome currently taking hydrocodone twice a day for severe pain will continue with the same  3  Seizure disorder [facility])  Assessment & Plan:  Patient is on Keppra 500 mg twice a day continue the same  4  Fibromyalgia  Assessment & Plan:  Patient on Cymbalta will continue the same  Discussion Summary and Plan: Today's care plan and medications were reviewed with patient in detail and all their questions answered to their satisfaction  Chief Complaint   Patient presents with   • Follow-up      Subjective:  Patient has come for a follow-up evaluation she has been experiencing increasing hot feeling sensation in the extremities and in her body  currently taking levothyroxine 112 mcg daily in the past she was on 88 mcg and we adjusted the dose after the TSH level was coming up high  Continues to experience low back pain radiating on the lower extremity  The following portions of the patient's history were reviewed and updated as appropriate: allergies, current medications, past family history, past medical history, past social history, past surgical history and problem list     Review of Systems   Constitutional: Negative for chills and fever  Hot feeling in the body   HENT: Negative for ear pain and sore throat  Eyes: Negative for pain and visual disturbance  Respiratory: Negative for cough and shortness of breath  Cardiovascular: Negative for chest pain and palpitations     Gastrointestinal: Negative for abdominal pain and vomiting  Genitourinary: Negative for dysuria and hematuria  Musculoskeletal: Positive for arthralgias and back pain  Skin: Negative for color change and rash  Neurological: Negative for seizures and syncope  All other systems reviewed and are negative          Historical Information   Patient Active Problem List   Diagnosis   • Chronic pain syndrome   • Acquired hypothyroidism   • Seizure disorder ([facility] [address] )   • Gastroesophageal reflux disease without esophagitis   • Allergies   • Fibromyalgia   • Anxiety   • Acute pain of left shoulder   • Migraines   • Back pain due to inflammatory process   • Encounter for immunization     Past Medical History:   Diagnosis Date   • Anxiety with depression    • Arm fracture, left     x2   • Arthritis    • Degenerative joint disease    • Fibromyalgia    • GERD (gastroesophageal reflux disease)    • Hypertension    • Hypothyroidism    • Kidney stones    • Low back pain    • Seasonal allergies    • Seizure disorder (HCC)      Past Surgical History:   Procedure Laterality Date   • COLONOSCOPY     • DILATION AND CURETTAGE OF UTERUS     • NECK SURGERY      Plate   • ORIF FOREARM FRACTURE Left    • SHOULDER SURGERY      x2   • TONSILLECTOMY       Social History     Substance and Sexual Activity   Alcohol Use Yes    Comment: Socially/very rare     Social History     Substance and Sexual Activity   Drug Use Never     Social History     Tobacco Use   Smoking Status Former   • Types: Cigarettes   • Quit date:    • Years since quittin 9   Smokeless Tobacco Never     Family History   Problem Relation Age of Onset   • Stroke Mother    • Heart disease Father      Health Maintenance Due   Topic   • Hepatitis C Screening    • Medicare Annual Wellness Visit (AWV)    • Hepatitis B Vaccine (1 of 3 - 3-dose series)   • Depression Screening    • BMI: Followup Plan    • Breast Cancer Screening: Mammogram    • Colorectal Cancer Screening    • Osteoporosis Screening    • Fall Risk    • Urinary Incontinence Screening    • Pneumococcal Vaccine: 65+ Years (1 - PCV)   • COVID-19 Vaccine (3 - Booster for Moderna series)      Meds/Allergies       Current Outpatient Medications:   •  Cholecalciferol (Vitamin D3) 50 MCG (2000 UT) TABS, Take 2,000 Units by mouth in the morning, Disp: , Rfl:   •  DULoxetine (CYMBALTA) 30 mg delayed release capsule, Take 1 capsule (30 mg total) by mouth in the morning, Disp: 90 capsule, Rfl: 1  •  fluticasone (FLONASE) 50 mcg/act nasal spray, 2 sprays into each nostril daily, Disp: 15 8 mL, Rfl: 1  •  HYDROcodone-acetaminophen (NORCO) 5-325 mg per tablet, Take 1 tablet by mouth 2 (two) times a day as needed for pain Max Daily Amount: 2 tablets, Disp: 60 tablet, Rfl: 0  •  levETIRAcetam (KEPPRA) 500 mg tablet, Take 1 tablet (500 mg total) by mouth 2 (two) times a day, Disp: 180 tablet, Rfl: 1  •  levothyroxine 112 mcg tablet, Take 1 tablet (112 mcg total) by mouth in the morning, Disp: 90 tablet, Rfl: 1  •  montelukast (SINGULAIR) 10 mg tablet, Take 1 tablet (10 mg total) by mouth in the morning, Disp: 90 tablet, Rfl: 1  •  pantoprazole (PROTONIX) 40 mg tablet, Take 1 tablet (40 mg total) by mouth daily, Disp: 90 tablet, Rfl: 1      Objective:    Vitals:   /80 (BP Location: Right arm, Patient Position: Sitting, Cuff Size: Standard)   Pulse 79   Ht 5' 5" (1 651 m)   Wt 104 kg (230 lb)   SpO2 95%   BMI 38 27 kg/m²   Body mass index is 38 27 kg/m²  Vitals:    11/30/22 1022   Weight: 104 kg (230 lb)       Physical Exam  Vitals and nursing note reviewed  Constitutional:       Appearance: Normal appearance  Cardiovascular:      Rate and Rhythm: Normal rate and regular rhythm  Heart sounds: Normal heart sounds  Pulmonary:      Effort: Pulmonary effort is normal       Breath sounds: Normal breath sounds  Musculoskeletal:      Cervical back: Normal range of motion and neck supple  Right lower leg: No edema        Left lower leg: No edema  Comments: SLR about 30° on the right side 40 on the left   Neurological:      Mental Status: She is alert and oriented to person, place, and time  Lab Review   No visits with results within 2 Month(s) from this visit  Latest known visit with results is:   Transcribe Orders on 08/14/2017   Component Date Value Ref Range Status   • Color, UA 08/14/2017 Yellow   Final   • Clarity, UA 08/14/2017 Clear   Final   • Specific Gravity, UA 08/14/2017 <=1 005  1 000 - 1 030 Final   • pH, UA 08/14/2017 5 5  5 0 - 9 0 Final   • Leukocytes, UA 08/14/2017 Trace (A)  Negative Final   • Nitrite, UA 08/14/2017 Negative  Negative Final   • Protein, UA 08/14/2017 Negative  Negative mg/dl Final   • Glucose, UA 08/14/2017 Negative  Negative mg/dl Final   • Ketones, UA 08/14/2017 Negative  Negative mg/dl Final   • Urobilinogen, UA 08/14/2017 0 2  0 2, 1 0 E U /dl E U /dl Final   • Bilirubin, UA 08/14/2017 Negative  Negative Final   • Occult Blood, UA 08/14/2017 Small (A)  Negative Final   • RBC, UA 08/14/2017 0-1 (A)  None Seen /hpf Final   • WBC, UA 08/14/2017 4-10 (A)  None Seen /hpf Final   • Epithelial Cells 08/14/2017 Occasional  None Seen, Occasional /hpf Final   • Bacteria, UA 08/14/2017 Occasional  None Seen, Occasional /hpf Final         Bertin Rush MD        "This note has been constructed using a voice recognition system  Therefore there may be syntax, spelling, and/or grammatical errors   Please call if you have any questions  "

## 2022-12-29 ENCOUNTER — OFFICE VISIT (OUTPATIENT)
Dept: FAMILY MEDICINE CLINIC | Facility: CLINIC | Age: 72
End: 2022-12-29

## 2022-12-29 VITALS
DIASTOLIC BLOOD PRESSURE: 80 MMHG | SYSTOLIC BLOOD PRESSURE: 134 MMHG | HEART RATE: 80 BPM | HEIGHT: 65 IN | BODY MASS INDEX: 37.32 KG/M2 | WEIGHT: 224 LBS | OXYGEN SATURATION: 94 %

## 2022-12-29 DIAGNOSIS — E83.42 HYPOMAGNESEMIA: ICD-10-CM

## 2022-12-29 DIAGNOSIS — E53.8 B12 DEFICIENCY: ICD-10-CM

## 2022-12-29 DIAGNOSIS — E78.5 DYSLIPIDEMIA: ICD-10-CM

## 2022-12-29 DIAGNOSIS — G89.4 CHRONIC PAIN SYNDROME: Primary | ICD-10-CM

## 2022-12-29 DIAGNOSIS — G40.909 SEIZURE DISORDER (HCC): ICD-10-CM

## 2022-12-29 DIAGNOSIS — E03.9 ACQUIRED HYPOTHYROIDISM: ICD-10-CM

## 2022-12-29 DIAGNOSIS — F11.20 CONTINUOUS OPIOID DEPENDENCE (HCC): ICD-10-CM

## 2022-12-29 DIAGNOSIS — Z13.0 SCREENING FOR DEFICIENCY ANEMIA: ICD-10-CM

## 2022-12-29 DIAGNOSIS — R73.03 PRE-DIABETES: ICD-10-CM

## 2022-12-29 DIAGNOSIS — M79.7 FIBROMYALGIA: ICD-10-CM

## 2022-12-29 DIAGNOSIS — K21.9 GASTROESOPHAGEAL REFLUX DISEASE WITHOUT ESOPHAGITIS: ICD-10-CM

## 2022-12-29 RX ORDER — CALCIUM CARBONATE 300MG(750)
400 TABLET,CHEWABLE ORAL DAILY
Qty: 90 TABLET | Refills: 0
Start: 2022-12-29

## 2022-12-29 RX ORDER — LANOLIN ALCOHOL/MO/W.PET/CERES
1000 CREAM (GRAM) TOPICAL DAILY
Qty: 90 TABLET | Refills: 1
Start: 2022-12-29

## 2022-12-29 RX ORDER — HYDROCODONE BITARTRATE AND ACETAMINOPHEN 5; 325 MG/1; MG/1
1 TABLET ORAL 2 TIMES DAILY PRN
Qty: 60 TABLET | Refills: 0 | Status: SHIPPED | OUTPATIENT
Start: 2022-12-29

## 2022-12-29 RX ORDER — LEVOTHYROXINE SODIUM 0.1 MG/1
100 TABLET ORAL DAILY
Qty: 90 TABLET | Refills: 1 | Status: SHIPPED | OUTPATIENT
Start: 2022-12-29

## 2022-12-29 NOTE — PROGRESS NOTES
Office Visit Note  22     Ra Stagers 71 y o  female MRN: [id number]  : 1950    Assessment:     1  Chronic pain syndrome  Assessment & Plan:  Patient with chronic pain syndrome we will continue with the hydrocodone twice a day for severe pain for now  Orders:  -     HYDROcodone-acetaminophen (NORCO) 5-325 mg per tablet; Take 1 tablet by mouth 2 (two) times a day as needed for pain Max Daily Amount: 2 tablets    2  Acquired hypothyroidism  Assessment & Plan:  Continue 100 mcg of levothyroxine follow-up with repeat level prior to the next visit  Orders:  -     levothyroxine 100 mcg tablet; Take 1 tablet (100 mcg total) by mouth in the morning Stop taking the 112 mcg of levothyroxine  -     TSH, 3rd generation with Free T4 reflex; Future; Expected date: 2023    3  Continuous opioid dependence [facility])  Assessment & Plan:  Patient with continuous opioid dependence discussed with patient regarding the same  4  Hypomagnesemia  -     Magnesium 400 MG TABS; Take 1 tablet (400 mg total) by mouth in the morning    5  B12 deficiency  -     vitamin B-12 (VITAMIN B-12) 1,000 mcg tablet; Take 1 tablet (1,000 mcg total) by mouth daily    6  Fibromyalgia  Assessment & Plan:  Patient on Cymbalta appears to be helping we will continue the same      7  Gastroesophageal reflux disease without esophagitis  Assessment & Plan:  Continue pantoprazole      8  Seizure disorder [facility])  Assessment & Plan:  Patient is on Keppra 500 mg twice a day we will continue the same  9  Screening for deficiency anemia  -     CBC and differential; Future    10  Dyslipidemia  -     Lipid panel; Future    11  Pre-diabetes  -     Hemoglobin A1C; Future; Expected date: 2023  -     Comprehensive metabolic panel; Future  -     UA w Reflex to Microscopic w Reflex to Culture; Future; Expected date: 2022        Discussion Summary and Plan:   Today's care plan and medications were reviewed with patient in detail and all their questions answered to their satisfaction  Chief Complaint   Patient presents with   • Follow-up      Subjective:  Patient has come in for evaluation regarding symptoms of her low back pain currently she is taking hydrocodone for the pain  Patient is feeling much better after we reduce the dose of the [id number] mcg daily  Patient with a history of seizure disorder currently on Keppra  All medication reviewed labs reviewed which are going to reorder especially for the TSH level  The following portions of the patient's history were reviewed and updated as appropriate: allergies, current medications, past family history, past medical history, past social history, past surgical history and problem list     Review of Systems   Constitutional: Negative for chills and fever  HENT: Negative for ear pain and sore throat  Eyes: Negative for pain and visual disturbance  Respiratory: Negative for cough and shortness of breath  Cardiovascular: Negative for chest pain and palpitations  Gastrointestinal: Negative for abdominal pain and vomiting  Genitourinary: Negative for dysuria and hematuria  Musculoskeletal: Negative for arthralgias and back pain  Skin: Negative for color change and rash  Neurological: Negative for seizures and syncope  All other systems reviewed and are negative          Historical Information   Patient Active Problem List   Diagnosis   • Chronic pain syndrome   • Acquired hypothyroidism   • Seizure disorder ([facility] )   • Gastroesophageal reflux disease without esophagitis   • Allergies   • Fibromyalgia   • Anxiety   • Migraines   • Back pain due to inflammatory process   • Encounter for immunization   • Continuous opioid dependence ([facility] )     Past Medical History:   Diagnosis Date   • Anxiety with depression    • Arm fracture, left     x2   • Arthritis    • Degenerative joint disease    • Fibromyalgia    • GERD (gastroesophageal reflux disease)    • Hypertension    • Hypothyroidism    • Kidney stones    • Low back pain    • Seasonal allergies    • Seizure disorder (HCC)      Past Surgical History:   Procedure Laterality Date   • COLONOSCOPY     • DILATION AND CURETTAGE OF UTERUS     • NECK SURGERY      Plate   • ORIF FOREARM FRACTURE Left    • SHOULDER SURGERY      x2   • TONSILLECTOMY       Social History     Substance and Sexual Activity   Alcohol Use Yes    Comment: Socially/very rare     Social History     Substance and Sexual Activity   Drug Use Never     Social History     Tobacco Use   Smoking Status Former   • Types: Cigarettes   • Quit date:    • Years since quittin 0   Smokeless Tobacco Never     Family History   Problem Relation Age of Onset   • Stroke Mother    • Heart disease Father      Health Maintenance Due   Topic   • Hepatitis C Screening    • Medicare Annual Wellness Visit (AWV)    • Hepatitis B Vaccine (1 of 3 - 3-dose series)   • Depression Screening    • BMI: Followup Plan    • Breast Cancer Screening: Mammogram    • Colorectal Cancer Screening    • Osteoporosis Screening    • Fall Risk    • Urinary Incontinence Screening    • Pneumococcal Vaccine: 65+ Years (1 - PCV)   • COVID-19 Vaccine (3 - Booster for Moderna series)      Meds/Allergies       Current Outpatient Medications:   •  Cholecalciferol (Vitamin D3) 50 MCG (2000 UT) TABS, Take 2,000 Units by mouth in the morning, Disp: , Rfl:   •  DULoxetine (CYMBALTA) 30 mg delayed release capsule, Take 1 capsule (30 mg total) by mouth in the morning, Disp: 90 capsule, Rfl: 1  •  fluticasone (FLONASE) 50 mcg/act nasal spray, 2 sprays into each nostril daily, Disp: 15 8 mL, Rfl: 1  •  HYDROcodone-acetaminophen (NORCO) 5-325 mg per tablet, Take 1 tablet by mouth 2 (two) times a day as needed for pain Max Daily Amount: 2 tablets, Disp: 60 tablet, Rfl: 0  •  levETIRAcetam (KEPPRA) 500 mg tablet, Take 1 tablet (500 mg total) by mouth 2 (two) times a day, Disp: 180 tablet, Rfl: 1  •  levothyroxine 100 mcg tablet, Take 1 tablet (100 mcg total) by mouth in the morning Stop taking the 112 mcg of levothyroxine, Disp: 90 tablet, Rfl: 1  •  Magnesium 400 MG TABS, Take 1 tablet (400 mg total) by mouth in the morning, Disp: 90 tablet, Rfl: 0  •  montelukast (SINGULAIR) 10 mg tablet, Take 1 tablet (10 mg total) by mouth in the morning, Disp: 90 tablet, Rfl: 1  •  pantoprazole (PROTONIX) 40 mg tablet, Take 1 tablet (40 mg total) by mouth daily, Disp: 90 tablet, Rfl: 1  •  vitamin B-12 (VITAMIN B-12) 1,000 mcg tablet, Take 1 tablet (1,000 mcg total) by mouth daily, Disp: 90 tablet, Rfl: 1      Objective:    Vitals:   /80 (BP Location: Right arm, Patient Position: Sitting, Cuff Size: Standard)   Pulse 80   Ht 5' 5" (1 651 m)   Wt 102 kg (224 lb)   SpO2 94%   BMI 37 28 kg/m²   Body mass index is 37 28 kg/m²  Vitals:    12/29/22 1157   Weight: 102 kg (224 lb)       Physical Exam  Vitals and nursing note reviewed  Constitutional:       Appearance: Normal appearance  Cardiovascular:      Rate and Rhythm: Normal rate and regular rhythm  Heart sounds: Normal heart sounds  Pulmonary:      Effort: Pulmonary effort is normal       Breath sounds: Normal breath sounds  Abdominal:      Palpations: Abdomen is soft  Musculoskeletal:         General: Tenderness present  Cervical back: Normal range of motion and neck supple  Right lower leg: No edema  Left lower leg: No edema  Neurological:      Mental Status: She is alert and oriented to person, place, and time  Lab Review   No visits with results within 2 Month(s) from this visit     Latest known visit with results is:   Transcribe Orders on 08/14/2017   Component Date Value Ref Range Status   • Color,  08/14/2017 Yellow   Final   • Clarity,  08/14/2017 Clear   Final   • Specific Gravity,  08/14/2017 <=1 005  1 000 - 1 030 Final   • pH,  08/14/2017 5 5  5 0 - 9 0 Final   • Leukocytes,  08/14/2017 Trace (A)  Negative Final   • Nitrite, UA 08/14/2017 Negative  Negative Final   • Protein, UA 08/14/2017 Negative  Negative mg/dl Final   • Glucose, UA 08/14/2017 Negative  Negative mg/dl Final   • Ketones, UA 08/14/2017 Negative  Negative mg/dl Final   • Urobilinogen, UA 08/14/2017 0 2  0 2, 1 0 E U /dl E U /dl Final   • Bilirubin, UA 08/14/2017 Negative  Negative Final   • Occult Blood, UA 08/14/2017 Small (A)  Negative Final   • RBC, UA 08/14/2017 0-1 (A)  None Seen /hpf Final   • WBC, UA 08/14/2017 4-10 (A)  None Seen /hpf Final   • Epithelial Cells 08/14/2017 Occasional  None Seen, Occasional /hpf Final   • Bacteria, UA 08/14/2017 Occasional  None Seen, Occasional /hpf Final         Harinder Waddell MD        "This note has been constructed using a voice recognition system  Therefore there may be syntax, spelling, and/or grammatical errors   Please call if you have any questions  "

## 2022-12-29 NOTE — ASSESSMENT & PLAN NOTE
Patient with chronic pain syndrome we will continue with the hydrocodone twice a day for severe pain for now

## 2023-01-23 ENCOUNTER — RA CDI HCC (OUTPATIENT)
Dept: OTHER | Facility: HOSPITAL | Age: 73
End: 2023-01-23

## 2023-01-23 NOTE — PROGRESS NOTES
E66 01 for 2023  [facility] 75  coding opportunities          Chart Reviewed number of suggestions sent to Provider: 1     Patients Insurance     Medicare Insurance: [employer]

## 2023-01-30 ENCOUNTER — OFFICE VISIT (OUTPATIENT)
Dept: FAMILY MEDICINE CLINIC | Facility: CLINIC | Age: 73
End: 2023-01-30

## 2023-01-30 VITALS
BODY MASS INDEX: 37.32 KG/M2 | HEIGHT: 65 IN | DIASTOLIC BLOOD PRESSURE: 72 MMHG | WEIGHT: 224 LBS | HEART RATE: 70 BPM | SYSTOLIC BLOOD PRESSURE: 130 MMHG | OXYGEN SATURATION: 96 %

## 2023-01-30 DIAGNOSIS — E03.9 ACQUIRED HYPOTHYROIDISM: ICD-10-CM

## 2023-01-30 DIAGNOSIS — F11.20 CONTINUOUS OPIOID DEPENDENCE (HCC): Primary | ICD-10-CM

## 2023-01-30 DIAGNOSIS — K21.9 GASTROESOPHAGEAL REFLUX DISEASE WITHOUT ESOPHAGITIS: ICD-10-CM

## 2023-01-30 DIAGNOSIS — G43.909 MIGRAINE WITHOUT STATUS MIGRAINOSUS, NOT INTRACTABLE, UNSPECIFIED MIGRAINE TYPE: ICD-10-CM

## 2023-01-30 DIAGNOSIS — G40.909 SEIZURE DISORDER (HCC): ICD-10-CM

## 2023-01-30 DIAGNOSIS — G89.4 CHRONIC PAIN SYNDROME: ICD-10-CM

## 2023-01-30 DIAGNOSIS — M79.7 FIBROMYALGIA: ICD-10-CM

## 2023-01-30 DIAGNOSIS — M54.89 BACK PAIN DUE TO INFLAMMATORY PROCESS: ICD-10-CM

## 2023-01-30 DIAGNOSIS — T78.40XD ALLERGY, SUBSEQUENT ENCOUNTER: ICD-10-CM

## 2023-01-30 PROBLEM — Z23 ENCOUNTER FOR IMMUNIZATION: Status: RESOLVED | Noted: 2022-10-18 | Resolved: 2023-01-30

## 2023-01-30 RX ORDER — HYDROCODONE BITARTRATE AND ACETAMINOPHEN 5; 325 MG/1; MG/1
1 TABLET ORAL 2 TIMES DAILY PRN
Qty: 60 TABLET | Refills: 0 | Status: SHIPPED | OUTPATIENT
Start: 2023-01-30

## 2023-01-30 NOTE — PROGRESS NOTES
Office Visit Note  23     Delta Erm 71 y o  female MRN: [id number]  : 1950    Assessment:     1  Acquired hypothyroidism  Assessment & Plan:  Patient is feeling much better on 100 mcg of levothyroxine compared to before she also lost some weight in the last couple of months time  We will continue with the same dosage for now  2  Seizure disorder [facility])  Assessment & Plan: We will continue Keppra 500 mg twice a day      3  Allergy, subsequent encounter    4  Back pain due to inflammatory process    5  Chronic pain syndrome  Assessment & Plan:  Patient with chronic pain syndrome currently taking pain medication hydrocodone we will continue with the same for now  6  Continuous opioid dependence ([facility] [address] )  Assessment & Plan:  Patient with chronic opiate dependence for the back pain currently taking hydrocodone 5/325 twice a day as needed we will continue the same again discussed regarding dependence on medications  7  Fibromyalgia  Assessment & Plan:  Continue Cymbalta 30 mg daily      8  Gastroesophageal reflux disease without esophagitis  Assessment & Plan:  Patient is on pantoprazole 40 mg daily which is helping with her reflux symptoms we will continue      9  Migraine without status migrainosus, not intractable, unspecified migraine type      BMI Counseling: Body mass index is 37 28 kg/m²  The BMI is above normal  Nutrition recommendations include decreasing portion sizes, decreasing fast food intake, consuming healthier snacks, moderation in carbohydrate intake and reducing intake of cholesterol  Exercise recommendations include exercising 3-5 times per week  No pharmacotherapy was ordered  Rationale for BMI follow-up plan is due to patient being overweight or obese  Depression Screening and Follow-up Plan: Patient was screened for depression during today's encounter  They screened negative with a PHQ-2 score of 0  Discussion Summary and Plan:   Today's care plan and medications were reviewed with patient in detail and all their questions answered to their satisfaction  Chief Complaint   Patient presents with   • Follow-up      Subjective:  Patient is coming here for a follow-up evaluation regarding pain management continues to experience low back pain  In beginning of her January it appears she has some upper respiratory tract infection symptoms she thinks it might be COVID she took some NyQuil for the cold congestion symptoms along with fever then it felt better  Patient had slight disturbance with the smell but did not with the taste  Then the symptoms gradually cleared up  Patient had received 2 shots of COVID she did not receive the booster  Medications reviewed patient has not gone for the lab work and she is planning to go next time with a complete blood test       The following portions of the patient's history were reviewed and updated as appropriate: allergies, current medications, past family history, past medical history, past social history, past surgical history and problem list     Review of Systems   Constitutional: Negative for chills and fever  HENT: Negative for ear pain and sore throat  Eyes: Negative for pain and visual disturbance  Respiratory: Negative for cough and shortness of breath  Cardiovascular: Negative for chest pain and palpitations  Gastrointestinal: Negative for abdominal pain and vomiting  Genitourinary: Negative for dysuria and hematuria  Musculoskeletal: Negative for arthralgias and back pain  Skin: Negative for color change and rash  Neurological: Negative for seizures and syncope  All other systems reviewed and are negative          Historical Information   Patient Active Problem List   Diagnosis   • Chronic pain syndrome   • Acquired hypothyroidism   • Seizure disorder ([facility] [address] )   • Gastroesophageal reflux disease without esophagitis   • Allergies   • Fibromyalgia   • Anxiety   • Migraines   • Back pain due to inflammatory process   • Continuous opioid dependence (HCC)     Past Medical History:   Diagnosis Date   • Anxiety with depression    • Arm fracture, left     x2   • Arthritis    • Degenerative joint disease    • Encounter for immunization 10/18/2022   • Fibromyalgia    • GERD (gastroesophageal reflux disease)    • Hypertension    • Hypothyroidism    • Kidney stones    • Low back pain    • Seasonal allergies    • Seizure disorder (HCC)      Past Surgical History:   Procedure Laterality Date   • COLONOSCOPY     • DILATION AND CURETTAGE OF UTERUS     • NECK SURGERY      Plate   • ORIF FOREARM FRACTURE Left    • SHOULDER SURGERY      x2   • TONSILLECTOMY       Social History     Substance and Sexual Activity   Alcohol Use Yes    Comment: Socially/very rare     Social History     Substance and Sexual Activity   Drug Use Never     Social History     Tobacco Use   Smoking Status Former   • Types: Cigarettes   • Quit date:    • Years since quittin 1   Smokeless Tobacco Never     Family History   Problem Relation Age of Onset   • Stroke Mother    • Heart disease Father      Health Maintenance Due   Topic   • Hepatitis C Screening    • Medicare Annual Wellness Visit (AWV)    • BMI: Followup Plan    • Breast Cancer Screening: Mammogram    • Colorectal Cancer Screening    • Osteoporosis Screening    • Fall Risk    • Urinary Incontinence Screening    • Pneumococcal Vaccine: 65+ Years (1 - PCV)   • COVID-19 Vaccine (3 - Booster for Moderna series)      Meds/Allergies       Current Outpatient Medications:   •  Cholecalciferol (Vitamin D3) 50 MCG (2000 UT) TABS, Take 2,000 Units by mouth in the morning, Disp: , Rfl:   •  DULoxetine (CYMBALTA) 30 mg delayed release capsule, Take 1 capsule (30 mg total) by mouth in the morning, Disp: 90 capsule, Rfl: 1  •  fluticasone (FLONASE) 50 mcg/act nasal spray, 2 sprays into each nostril daily, Disp: 15 8 mL, Rfl: 1  •  HYDROcodone-acetaminophen (NORCO) 5-325 mg per tablet, Take 1 tablet by mouth 2 (two) times a day as needed for pain Max Daily Amount: 2 tablets, Disp: 60 tablet, Rfl: 0  •  levETIRAcetam (KEPPRA) 500 mg tablet, Take 1 tablet (500 mg total) by mouth 2 (two) times a day, Disp: 180 tablet, Rfl: 1  •  levothyroxine 100 mcg tablet, Take 1 tablet (100 mcg total) by mouth in the morning Stop taking the 112 mcg of levothyroxine, Disp: 90 tablet, Rfl: 1  •  Magnesium 400 MG TABS, Take 1 tablet (400 mg total) by mouth in the morning, Disp: 90 tablet, Rfl: 0  •  montelukast (SINGULAIR) 10 mg tablet, Take 1 tablet (10 mg total) by mouth in the morning, Disp: 90 tablet, Rfl: 1  •  pantoprazole (PROTONIX) 40 mg tablet, Take 1 tablet (40 mg total) by mouth daily, Disp: 90 tablet, Rfl: 1  •  vitamin B-12 (VITAMIN B-12) 1,000 mcg tablet, Take 1 tablet (1,000 mcg total) by mouth daily, Disp: 90 tablet, Rfl: 1      Objective:    Vitals:   /72 (BP Location: Right arm, Patient Position: Sitting, Cuff Size: Large)   Pulse 70   Ht 5' 5" (1 651 m)   Wt 102 kg (224 lb)   SpO2 96%   BMI 37 28 kg/m²   Body mass index is 37 28 kg/m²  Vitals:    01/30/23 1042   Weight: 102 kg (224 lb)       Physical Exam  Vitals and nursing note reviewed  Constitutional:       Appearance: Normal appearance  Cardiovascular:      Rate and Rhythm: Normal rate and regular rhythm  Heart sounds: Normal heart sounds  Pulmonary:      Effort: Pulmonary effort is normal       Breath sounds: Normal breath sounds  Abdominal:      Palpations: Abdomen is soft  Musculoskeletal:      Cervical back: Normal range of motion and neck supple  Right lower leg: No edema  Left lower leg: No edema  Comments: SLR about 30 degrees both sides   Skin:     General: Skin is warm and dry  Neurological:      Mental Status: She is alert and oriented to person, place, and time  Lab Review   No visits with results within 2 Month(s) from this visit     Latest known visit with results is:   Transcribe Orders on 08/14/2017   Component Date Value Ref Range Status   • Color, UA 08/14/2017 Yellow   Final   • Clarity, UA 08/14/2017 Clear   Final   • Specific Gravity, UA 08/14/2017 <=1 005  1 000 - 1 030 Final   • pH, UA 08/14/2017 5 5  5 0 - 9 0 Final   • Leukocytes, UA 08/14/2017 Trace (A)  Negative Final   • Nitrite, UA 08/14/2017 Negative  Negative Final   • Protein, UA 08/14/2017 Negative  Negative mg/dl Final   • Glucose, UA 08/14/2017 Negative  Negative mg/dl Final   • Ketones, UA 08/14/2017 Negative  Negative mg/dl Final   • Urobilinogen, UA 08/14/2017 0 2  0 2, 1 0 E U /dl E U /dl Final   • Bilirubin, UA 08/14/2017 Negative  Negative Final   • Occult Blood, UA 08/14/2017 Small (A)  Negative Final   • RBC, UA 08/14/2017 0-1 (A)  None Seen /hpf Final   • WBC, UA 08/14/2017 4-10 (A)  None Seen /hpf Final   • Epithelial Cells 08/14/2017 Occasional  None Seen, Occasional /hpf Final   • Bacteria, UA 08/14/2017 Occasional  None Seen, Occasional /hpf Final         Juan M Nowak MD        "This note has been constructed using a voice recognition system  Therefore there may be syntax, spelling, and/or grammatical errors   Please call if you have any questions  "

## 2023-01-30 NOTE — ASSESSMENT & PLAN NOTE
Patient with chronic pain syndrome currently taking pain medication hydrocodone we will continue with the same for now

## 2023-01-30 NOTE — ASSESSMENT & PLAN NOTE
Patient with chronic opiate dependence for the back pain currently taking hydrocodone 5/325 twice a day as needed we will continue the same again discussed regarding dependence on medications

## 2023-01-30 NOTE — ASSESSMENT & PLAN NOTE
Patient is feeling much better on 100 mcg of levothyroxine compared to before she also lost some weight in the last couple of months time  We will continue with the same dosage for now

## 2023-02-21 ENCOUNTER — TELEPHONE (OUTPATIENT)
Dept: FAMILY MEDICINE CLINIC | Facility: CLINIC | Age: 73
End: 2023-02-21

## 2023-03-02 ENCOUNTER — OFFICE VISIT (OUTPATIENT)
Dept: FAMILY MEDICINE CLINIC | Facility: CLINIC | Age: 73
End: 2023-03-02

## 2023-03-02 VITALS
DIASTOLIC BLOOD PRESSURE: 78 MMHG | SYSTOLIC BLOOD PRESSURE: 128 MMHG | WEIGHT: 223 LBS | BODY MASS INDEX: 37.15 KG/M2 | OXYGEN SATURATION: 97 % | HEART RATE: 91 BPM | HEIGHT: 65 IN

## 2023-03-02 DIAGNOSIS — G40.909 SEIZURE DISORDER (HCC): ICD-10-CM

## 2023-03-02 DIAGNOSIS — Z12.11 SCREEN FOR COLON CANCER: ICD-10-CM

## 2023-03-02 DIAGNOSIS — G89.4 CHRONIC PAIN SYNDROME: ICD-10-CM

## 2023-03-02 DIAGNOSIS — Z12.31 ENCOUNTER FOR SCREENING MAMMOGRAM FOR MALIGNANT NEOPLASM OF BREAST: ICD-10-CM

## 2023-03-02 DIAGNOSIS — F41.9 ANXIETY: ICD-10-CM

## 2023-03-02 DIAGNOSIS — R07.89 CHEST TIGHTNESS: Primary | ICD-10-CM

## 2023-03-02 DIAGNOSIS — T78.40XD ALLERGY, SUBSEQUENT ENCOUNTER: ICD-10-CM

## 2023-03-02 DIAGNOSIS — E66.01 OBESITY, MORBID (HCC): ICD-10-CM

## 2023-03-02 DIAGNOSIS — F11.20 CONTINUOUS OPIOID DEPENDENCE (HCC): ICD-10-CM

## 2023-03-02 DIAGNOSIS — E03.9 ACQUIRED HYPOTHYROIDISM: ICD-10-CM

## 2023-03-02 DIAGNOSIS — K21.9 GASTROESOPHAGEAL REFLUX DISEASE WITHOUT ESOPHAGITIS: ICD-10-CM

## 2023-03-02 RX ORDER — LEVOTHYROXINE SODIUM 0.1 MG/1
100 TABLET ORAL DAILY
Qty: 90 TABLET | Refills: 1 | Status: SHIPPED | OUTPATIENT
Start: 2023-03-02

## 2023-03-02 RX ORDER — HYDROCODONE BITARTRATE AND ACETAMINOPHEN 5; 325 MG/1; MG/1
1 TABLET ORAL 2 TIMES DAILY PRN
Qty: 60 TABLET | Refills: 0 | Status: SHIPPED | OUTPATIENT
Start: 2023-03-02

## 2023-03-02 RX ORDER — ALBUTEROL SULFATE 90 UG/1
2 AEROSOL, METERED RESPIRATORY (INHALATION) EVERY 6 HOURS PRN
Qty: 8 G | Refills: 1 | Status: SHIPPED | OUTPATIENT
Start: 2023-03-02

## 2023-03-02 RX ORDER — DULOXETIN HYDROCHLORIDE 30 MG/1
30 CAPSULE, DELAYED RELEASE ORAL DAILY
Qty: 90 CAPSULE | Refills: 1 | Status: SHIPPED | OUTPATIENT
Start: 2023-03-02

## 2023-03-02 RX ORDER — PANTOPRAZOLE SODIUM 40 MG/1
40 TABLET, DELAYED RELEASE ORAL DAILY
Qty: 90 TABLET | Refills: 1 | Status: SHIPPED | OUTPATIENT
Start: 2023-03-02

## 2023-03-02 RX ORDER — LEVETIRACETAM 500 MG/1
500 TABLET ORAL 2 TIMES DAILY
Qty: 180 TABLET | Refills: 1 | Status: SHIPPED | OUTPATIENT
Start: 2023-03-02

## 2023-03-02 RX ORDER — FLUTICASONE PROPIONATE 50 MCG
2 SPRAY, SUSPENSION (ML) NASAL DAILY
Qty: 15.8 ML | Refills: 1 | Status: SHIPPED | OUTPATIENT
Start: 2023-03-02

## 2023-03-02 RX ORDER — MONTELUKAST SODIUM 10 MG/1
10 TABLET ORAL DAILY
Qty: 90 TABLET | Refills: 1 | Status: SHIPPED | OUTPATIENT
Start: 2023-03-02

## 2023-03-02 NOTE — PROGRESS NOTES
Office Visit Note  23     Marcy Lyles 71 y o  female MRN: [id number]  : 1950    Assessment:     1  Chest tightness  Assessment & Plan:  Patient with tightness feeling in the chest whenever she is exposed to smoke  We will prescribe her albuterol inhaler generic to be used on a as needed basis and reevaluate  2  Anxiety  -     DULoxetine (CYMBALTA) 30 mg delayed release capsule; Take 1 capsule (30 mg total) by mouth in the morning    3  Allergy, subsequent encounter  -     fluticasone (FLONASE) 50 mcg/act nasal spray; 2 sprays into each nostril daily  -     montelukast (SINGULAIR) 10 mg tablet; Take 1 tablet (10 mg total) by mouth in the morning  -     albuterol (PROVENTIL HFA,VENTOLIN HFA) 90 mcg/act inhaler; Inhale 2 puffs every 6 (six) hours as needed for wheezing    4  Seizure disorder [facility])  Assessment & Plan:  Patient is on Keppra 500 mg twice a day    Orders:  -     levETIRAcetam (KEPPRA) 500 mg tablet; Take 1 tablet (500 mg total) by mouth 2 (two) times a day    5  Acquired hypothyroidism  -     levothyroxine 100 mcg tablet; Take 1 tablet (100 mcg total) by mouth in the morning Stop taking the 112 mcg of levothyroxine    6  Gastroesophageal reflux disease without esophagitis  Assessment & Plan:  Continue Protonix    Orders:  -     pantoprazole (PROTONIX) 40 mg tablet; Take 1 tablet (40 mg total) by mouth daily    7  Obesity, morbid ([facility] [address] )  Assessment & Plan:  Patient BMI 37 11 discussed with patient regarding cutting back carbohydrate intake calorie intake lifestyle modification      8  Chronic pain syndrome  Assessment & Plan:  Patient with low back pain on pain medication hydrocodone we will continue the same for now renewed    Orders:  -     HYDROcodone-acetaminophen (NORCO) 5-325 mg per tablet; Take 1 tablet by mouth 2 (two) times a day as needed for pain Max Daily Amount: 2 tablets    9   Continuous opioid dependence [facility])  Assessment & Plan:  Patient has been on pain medication for a long time  Discussed with patient about dependence  However without the pain medication she has difficulty to move around  10  Screen for colon cancer  -     Ambulatory Referral to Gastroenterology; Future    11  Encounter for screening mammogram for malignant neoplasm of breast  -     Mammo screening bilateral w 3d & cad; Future; Expected date: 03/02/2023             Discussion Summary and Plan: Today's care plan and medications were reviewed with patient in detail and all their questions answered to their satisfaction  Chief Complaint   Patient presents with   • Follow-up      Subjective:  Patient is coming here for a follow-up evaluation with regards to the symptoms of feeling tight in the chest at times  The neighbors in the building smoke pot and that triggers this attack off tight feeling in the chest   No fever cough congestion symptoms  I ordered earlier albuterol inhaler but it is costing her a lot of money for the same  We will order generic albuterol with good Rx card now  Labs reviewed medications reviewed discussed with the patient  Patient also is due for colonoscopy we will order the same  We will also order the mammogram       The following portions of the patient's history were reviewed and updated as appropriate: allergies, current medications, past family history, past medical history, past social history, past surgical history and problem list     Review of Systems   Constitutional: Negative for chills and fever  HENT: Negative for ear pain and sore throat  Eyes: Negative for pain and visual disturbance  Respiratory: Positive for chest tightness  Negative for cough and shortness of breath  Cardiovascular: Negative for chest pain and palpitations  Gastrointestinal: Negative for abdominal pain and vomiting  Genitourinary: Negative for dysuria and hematuria  Musculoskeletal: Positive for arthralgias and back pain  Skin: Negative for color change and rash  Neurological: Negative for seizures and syncope  All other systems reviewed and are negative          Historical Information   Patient Active Problem List   Diagnosis   • Chronic pain syndrome   • Acquired hypothyroidism   • Seizure disorder (HCC)   • Gastroesophageal reflux disease without esophagitis   • Allergies   • Fibromyalgia   • Anxiety   • Migraines   • Back pain due to inflammatory process   • Continuous opioid dependence (HCC)   • Obesity, morbid (HCC)   • Chest tightness     Past Medical History:   Diagnosis Date   • Anxiety with depression    • Arm fracture, left     x2   • Arthritis    • Degenerative joint disease    • Encounter for immunization 10/18/2022   • Fibromyalgia    • GERD (gastroesophageal reflux disease)    • Hypertension    • Hypothyroidism    • Kidney stones    • Low back pain    • Seasonal allergies    • Seizure disorder (HCC)      Past Surgical History:   Procedure Laterality Date   • COLONOSCOPY     • DILATION AND CURETTAGE OF UTERUS     • NECK SURGERY      Plate   • ORIF FOREARM FRACTURE Left    • SHOULDER SURGERY      x2   • TONSILLECTOMY       Social History     Substance and Sexual Activity   Alcohol Use Yes    Comment: Socially/very rare     Social History     Substance and Sexual Activity   Drug Use Never     Social History     Tobacco Use   Smoking Status Former   • Types: Cigarettes   • Quit date:    • Years since quittin 1   • Passive exposure: Past   Smokeless Tobacco Never     Family History   Problem Relation Age of Onset   • Stroke Mother    • Heart disease Father      Health Maintenance Due   Topic   • Hepatitis C Screening    • Medicare Annual Wellness Visit (AWV)    • Breast Cancer Screening: Mammogram    • Colorectal Cancer Screening    • Osteoporosis Screening    • Pneumococcal Vaccine: 65+ Years (1 - PCV)   • COVID-19 Vaccine (3 - Booster for Moderna series)      Meds/Allergies       Current Outpatient Medications:   •  albuterol (PROVENTIL HFA,VENTOLIN HFA) 90 mcg/act inhaler, Inhale 2 puffs every 6 (six) hours as needed for wheezing, Disp: 8 g, Rfl: 1  •  Cholecalciferol (Vitamin D3) 50 MCG (2000 UT) TABS, Take 2,000 Units by mouth in the morning, Disp: , Rfl:   •  DULoxetine (CYMBALTA) 30 mg delayed release capsule, Take 1 capsule (30 mg total) by mouth in the morning, Disp: 90 capsule, Rfl: 1  •  fluticasone (FLONASE) 50 mcg/act nasal spray, 2 sprays into each nostril daily, Disp: 15 8 mL, Rfl: 1  •  HYDROcodone-acetaminophen (NORCO) 5-325 mg per tablet, Take 1 tablet by mouth 2 (two) times a day as needed for pain Max Daily Amount: 2 tablets, Disp: 60 tablet, Rfl: 0  •  levETIRAcetam (KEPPRA) 500 mg tablet, Take 1 tablet (500 mg total) by mouth 2 (two) times a day, Disp: 180 tablet, Rfl: 1  •  levothyroxine 100 mcg tablet, Take 1 tablet (100 mcg total) by mouth in the morning Stop taking the 112 mcg of levothyroxine, Disp: 90 tablet, Rfl: 1  •  Magnesium 400 MG TABS, Take 1 tablet (400 mg total) by mouth in the morning, Disp: 90 tablet, Rfl: 0  •  montelukast (SINGULAIR) 10 mg tablet, Take 1 tablet (10 mg total) by mouth in the morning, Disp: 90 tablet, Rfl: 1  •  pantoprazole (PROTONIX) 40 mg tablet, Take 1 tablet (40 mg total) by mouth daily, Disp: 90 tablet, Rfl: 1  •  vitamin B-12 (VITAMIN B-12) 1,000 mcg tablet, Take 1 tablet (1,000 mcg total) by mouth daily, Disp: 90 tablet, Rfl: 1      Objective:    Vitals:   /78 (BP Location: Right arm, Patient Position: Sitting, Cuff Size: Standard)   Pulse 91   Ht 5' 5" (1 651 m)   Wt 101 kg (223 lb)   SpO2 97%   BMI 37 11 kg/m²   Body mass index is 37 11 kg/m²  Vitals:    03/02/23 1055   Weight: 101 kg (223 lb)       Physical Exam  Vitals and nursing note reviewed  Constitutional:       Appearance: Normal appearance  Cardiovascular:      Rate and Rhythm: Normal rate and regular rhythm  Heart sounds: Normal heart sounds     Pulmonary:      Effort: Pulmonary effort is normal       Breath sounds: Normal breath sounds  Abdominal:      Palpations: Abdomen is soft  Musculoskeletal:      Cervical back: Normal range of motion and neck supple  Right lower leg: No edema  Left lower leg: No edema  Comments: Tenderness lumbosacral spine  Neurological:      Mental Status: She is alert and oriented to person, place, and time  Lab Review   No visits with results within 2 Month(s) from this visit  Latest known visit with results is:   Transcribe Orders on 08/14/2017   Component Date Value Ref Range Status   • Color, UA 08/14/2017 Yellow   Final   • Clarity, UA 08/14/2017 Clear   Final   • Specific Gravity, UA 08/14/2017 <=1 005  1 000 - 1 030 Final   • pH, UA 08/14/2017 5 5  5 0 - 9 0 Final   • Leukocytes, UA 08/14/2017 Trace (A)  Negative Final   • Nitrite, UA 08/14/2017 Negative  Negative Final   • Protein, UA 08/14/2017 Negative  Negative mg/dl Final   • Glucose, UA 08/14/2017 Negative  Negative mg/dl Final   • Ketones, UA 08/14/2017 Negative  Negative mg/dl Final   • Urobilinogen, UA 08/14/2017 0 2  0 2, 1 0 E U /dl E U /dl Final   • Bilirubin, UA 08/14/2017 Negative  Negative Final   • Occult Blood, UA 08/14/2017 Small (A)  Negative Final   • RBC, UA 08/14/2017 0-1 (A)  None Seen /hpf Final   • WBC, UA 08/14/2017 4-10 (A)  None Seen /hpf Final   • Epithelial Cells 08/14/2017 Occasional  None Seen, Occasional /hpf Final   • Bacteria, UA 08/14/2017 Occasional  None Seen, Occasional /hpf Final         Esha Ponce MD        "This note has been constructed using a voice recognition system  Therefore there may be syntax, spelling, and/or grammatical errors   Please call if you have any questions  "

## 2023-03-02 NOTE — ASSESSMENT & PLAN NOTE
Patient BMI 37 11 discussed with patient regarding cutting back carbohydrate intake calorie intake lifestyle modification

## 2023-03-02 NOTE — ASSESSMENT & PLAN NOTE
Patient has been on pain medication for a long time  Discussed with patient about dependence  However without the pain medication she has difficulty to move around

## 2023-03-02 NOTE — ASSESSMENT & PLAN NOTE
Patient with tightness feeling in the chest whenever she is exposed to smoke  We will prescribe her albuterol inhaler generic to be used on a as needed basis and reevaluate

## 2023-03-30 ENCOUNTER — RA CDI HCC (OUTPATIENT)
Dept: OTHER | Facility: HOSPITAL | Age: 73
End: 2023-03-30

## 2023-03-30 NOTE — PROGRESS NOTES
Previous suggestion used  [facility] 75  coding opportunities       Chart reviewed, no opportunity found: CHART REVIEWED, NO OPPORTUNITY FOUND        Patients Insurance     Medicare Insurance: [employer]

## 2023-04-06 ENCOUNTER — OFFICE VISIT (OUTPATIENT)
Dept: FAMILY MEDICINE CLINIC | Facility: CLINIC | Age: 73
End: 2023-04-06

## 2023-04-06 VITALS
WEIGHT: 221.8 LBS | SYSTOLIC BLOOD PRESSURE: 128 MMHG | BODY MASS INDEX: 36.96 KG/M2 | HEART RATE: 67 BPM | OXYGEN SATURATION: 97 % | HEIGHT: 65 IN | DIASTOLIC BLOOD PRESSURE: 80 MMHG

## 2023-04-06 DIAGNOSIS — M79.7 FIBROMYALGIA: ICD-10-CM

## 2023-04-06 DIAGNOSIS — G89.4 CHRONIC PAIN SYNDROME: Primary | ICD-10-CM

## 2023-04-06 DIAGNOSIS — M81.0 AGE-RELATED OSTEOPOROSIS WITHOUT CURRENT PATHOLOGICAL FRACTURE: ICD-10-CM

## 2023-04-06 DIAGNOSIS — F11.20 CONTINUOUS OPIOID DEPENDENCE (HCC): ICD-10-CM

## 2023-04-06 DIAGNOSIS — G40.909 SEIZURE DISORDER (HCC): ICD-10-CM

## 2023-04-06 DIAGNOSIS — T78.40XD ALLERGY, SUBSEQUENT ENCOUNTER: ICD-10-CM

## 2023-04-06 DIAGNOSIS — E66.01 OBESITY, MORBID (HCC): ICD-10-CM

## 2023-04-06 DIAGNOSIS — R07.89 CHEST TIGHTNESS: ICD-10-CM

## 2023-04-06 DIAGNOSIS — K21.9 GASTROESOPHAGEAL REFLUX DISEASE WITHOUT ESOPHAGITIS: ICD-10-CM

## 2023-04-06 DIAGNOSIS — E03.9 ACQUIRED HYPOTHYROIDISM: ICD-10-CM

## 2023-04-06 DIAGNOSIS — F41.9 ANXIETY: ICD-10-CM

## 2023-04-06 RX ORDER — HYDROCODONE BITARTRATE AND ACETAMINOPHEN 5; 325 MG/1; MG/1
1 TABLET ORAL 2 TIMES DAILY PRN
Qty: 60 TABLET | Refills: 0 | Status: SHIPPED | OUTPATIENT
Start: 2023-04-06

## 2023-04-06 NOTE — PROGRESS NOTES
Office Visit Note  23     Katia Buchanan 71 y o  female MRN: [id number]  : 1950    Assessment:     1  Chronic pain syndrome  Assessment & Plan:  Patient continues to experience pain in the low back area continue hydrocodone again dependence on the medication has been discussed with the patient    Orders:  -     HYDROcodone-acetaminophen (NORCO) 5-325 mg per tablet; Take 1 tablet by mouth 2 (two) times a day as needed for pain Max Daily Amount: 2 tablets    2  Acquired hypothyroidism  Assessment & Plan:  Continue levothyroxine 100 mcg follow-up with repeat TSH which I have ordered      3  Allergy, subsequent encounter  Assessment & Plan:  Patient is currently using nasal spray Flonase as needed and Singulair 10 mg daily      4  Anxiety    5  Chest tightness  Assessment & Plan:  Whenever she is exposed to smoke from marijuana from the neighborsPatient feeling much better when she uses the albuterol inhaler which she had to use it like for 5 times in the recent past      6  Continuous opioid dependence [facility])  Assessment & Plan:  Patient has been on pain medication for long  Of time discussed again about the dependence but without pain medication she is having difficulty moving around      7  Fibromyalgia  Assessment & Plan:  Patient with symptoms suggestive of fibromyalgia continue Cymbalta which appears to be helping  8  Gastroesophageal reflux disease without esophagitis  Assessment & Plan:  Continue Protonix      9  Obesity, morbid ([address] )  Assessment & Plan:  Patient BMI has come down to 36 91 discussed regarding lifestyle modification losing weight diet exercise      10  Seizure disorder [facility])  Assessment & Plan:  History of seizure disorder on Keppra 5 mg twice a day we will continue      11  Age-related osteoporosis without current pathological fracture  -     DXA bone density spine hip and pelvis; Future; Expected date: 2023             Discussion Summary and Plan:   Today's care plan and medications were reviewed with patient in detail and all their questions answered to their satisfaction  Chief Complaint   Patient presents with   • Follow-up      Subjective:  Patient with chronic pain syndrome  No acute medical problems in the recent past patient has been having some chest tightness symptoms because of the neighbor smoking marijuana she has been using albuterol inhaler appears to be helping on a as needed basis  Patient is due for lab work not done yet we will also recommend patient to get the mammogram and a DEXA scan done it appears her last colonoscopy was done in 2018 or 2019 we will get a copy of the same and decide when  she can go  The following portions of the patient's history were reviewed and updated as appropriate: allergies, current medications, past family history, past medical history, past social history, past surgical history and problem list     Review of Systems   Constitutional: Negative for chills and fever  HENT: Negative for ear pain and sore throat  Eyes: Negative for pain and visual disturbance  Respiratory: Positive for chest tightness  Negative for cough and shortness of breath  Cardiovascular: Negative for chest pain and palpitations  Gastrointestinal: Negative for abdominal pain and vomiting  Genitourinary: Negative for dysuria and hematuria  Musculoskeletal: Positive for arthralgias and back pain  Skin: Negative for color change and rash  Neurological: Negative for seizures and syncope  All other systems reviewed and are negative          Historical Information   Patient Active Problem List   Diagnosis   • Chronic pain syndrome   • Acquired hypothyroidism   • Seizure disorder ([facility] [address] )   • Gastroesophageal reflux disease without esophagitis   • Allergies   • Fibromyalgia   • Migraines   • Back pain due to inflammatory process   • Continuous opioid dependence ([facility] [address] )   • Obesity, morbid ([facility] [address] )   • Chest tightness     Past Medical History: Diagnosis Date   • Anxiety with depression    • Arm fracture, left     x2   • Arthritis    • Degenerative joint disease    • Encounter for immunization 10/18/2022   • Fibromyalgia    • GERD (gastroesophageal reflux disease)    • Hypertension    • Hypothyroidism    • Kidney stones    • Low back pain    • Seasonal allergies    • Seizure disorder (HCC)      Past Surgical History:   Procedure Laterality Date   • COLONOSCOPY     • DILATION AND CURETTAGE OF UTERUS     • NECK SURGERY      Plate   • ORIF FOREARM FRACTURE Left    • SHOULDER SURGERY      x2   • TONSILLECTOMY       Social History     Substance and Sexual Activity   Alcohol Use Yes    Comment: Socially/very rare     Social History     Substance and Sexual Activity   Drug Use Never     Social History     Tobacco Use   Smoking Status Former   • Types: Cigarettes   • Quit date:    • Years since quittin 2   • Passive exposure: Past   Smokeless Tobacco Never     Family History   Problem Relation Age of Onset   • Stroke Mother    • Heart disease Father      Health Maintenance Due   Topic   • Hepatitis C Screening    • Medicare Annual Wellness Visit (AWV)    • Breast Cancer Screening: Mammogram    • Colorectal Cancer Screening    • Osteoporosis Screening    • Pneumococcal Vaccine: 65+ Years (1 - PCV)   • COVID-19 Vaccine (3 - Booster for Moderna series)      Meds/Allergies       Current Outpatient Medications:   •  albuterol (PROVENTIL HFA,VENTOLIN HFA) 90 mcg/act inhaler, Inhale 2 puffs every 6 (six) hours as needed for wheezing, Disp: 8 g, Rfl: 1  •  Cholecalciferol (Vitamin D3) 50 MCG (2000 UT) TABS, Take 2,000 Units by mouth in the morning, Disp: , Rfl:   •  DULoxetine (CYMBALTA) 30 mg delayed release capsule, Take 1 capsule (30 mg total) by mouth in the morning, Disp: 90 capsule, Rfl: 1  •  fluticasone (FLONASE) 50 mcg/act nasal spray, 2 sprays into each nostril daily, Disp: 15 8 mL, Rfl: 1  •  HYDROcodone-acetaminophen (NORCO) 5-325 mg per tablet, "Take 1 tablet by mouth 2 (two) times a day as needed for pain Max Daily Amount: 2 tablets, Disp: 60 tablet, Rfl: 0  •  levETIRAcetam (KEPPRA) 500 mg tablet, Take 1 tablet (500 mg total) by mouth 2 (two) times a day, Disp: 180 tablet, Rfl: 1  •  levothyroxine 100 mcg tablet, Take 1 tablet (100 mcg total) by mouth in the morning Stop taking the 112 mcg of levothyroxine, Disp: 90 tablet, Rfl: 1  •  Magnesium 400 MG TABS, Take 1 tablet (400 mg total) by mouth in the morning, Disp: 90 tablet, Rfl: 0  •  montelukast (SINGULAIR) 10 mg tablet, Take 1 tablet (10 mg total) by mouth in the morning, Disp: 90 tablet, Rfl: 1  •  pantoprazole (PROTONIX) 40 mg tablet, Take 1 tablet (40 mg total) by mouth daily, Disp: 90 tablet, Rfl: 1  •  vitamin B-12 (VITAMIN B-12) 1,000 mcg tablet, Take 1 tablet (1,000 mcg total) by mouth daily, Disp: 90 tablet, Rfl: 1      Objective:    Vitals:   /80 (BP Location: Right arm, Patient Position: Sitting, Cuff Size: Standard)   Pulse 67   Ht 5' 5\" (1 651 m)   Wt 101 kg (221 lb 12 8 oz)   SpO2 97%   BMI 36 91 kg/m²   Body mass index is 36 91 kg/m²  Vitals:    04/06/23 1150   Weight: 101 kg (221 lb 12 8 oz)       Physical Exam  Vitals and nursing note reviewed  Constitutional:       Appearance: Normal appearance  Cardiovascular:      Rate and Rhythm: Normal rate and regular rhythm  Heart sounds: Normal heart sounds  Pulmonary:      Effort: Pulmonary effort is normal       Breath sounds: Normal breath sounds  Abdominal:      Palpations: Abdomen is soft  Musculoskeletal:      Cervical back: Normal range of motion and neck supple  Right lower leg: No edema  Left lower leg: No edema  Comments: SLR about 30 degrees both side   Neurological:      Mental Status: She is alert and oriented to person, place, and time  Lab Review   No visits with results within 2 Month(s) from this visit     Latest known visit with results is:   Transcribe Orders on 08/14/2017 " "  Component Date Value Ref Range Status   • Color, UA 08/14/2017 Yellow   Final   • Clarity, UA 08/14/2017 Clear   Final   • Specific Gravity, UA 08/14/2017 <=1 005  1 000 - 1 030 Final   • pH, UA 08/14/2017 5 5  5 0 - 9 0 Final   • Leukocytes, UA 08/14/2017 Trace (A)  Negative Final   • Nitrite, UA 08/14/2017 Negative  Negative Final   • Protein, UA 08/14/2017 Negative  Negative mg/dl Final   • Glucose, UA 08/14/2017 Negative  Negative mg/dl Final   • Ketones, UA 08/14/2017 Negative  Negative mg/dl Final   • Urobilinogen, UA 08/14/2017 0 2  0 2, 1 0 E U /dl E U /dl Final   • Bilirubin, UA 08/14/2017 Negative  Negative Final   • Occult Blood, UA 08/14/2017 Small (A)  Negative Final   • RBC, UA 08/14/2017 0-1 (A)  None Seen /hpf Final   • WBC, UA 08/14/2017 4-10 (A)  None Seen /hpf Final   • Epithelial Cells 08/14/2017 Occasional  None Seen, Occasional /hpf Final   • Bacteria, UA 08/14/2017 Occasional  None Seen, Occasional /hpf Final         Elly Wynne MD        \"This note has been constructed using a voice recognition system  Therefore there may be syntax, spelling, and/or grammatical errors  Please call if you have any questions   \"  "

## 2023-04-06 NOTE — ASSESSMENT & PLAN NOTE
Whenever she is exposed to smoke from marijuana from the neighborsPatient feeling much better when she uses the albuterol inhaler which she had to use it like for 5 times in the recent past

## 2023-04-06 NOTE — ASSESSMENT & PLAN NOTE
Patient BMI has come down to 36 91 discussed regarding lifestyle modification losing weight diet exercise

## 2023-04-06 NOTE — ASSESSMENT & PLAN NOTE
Patient has been on pain medication for long    Of time discussed again about the dependence but without pain medication she is having difficulty moving around

## 2023-04-06 NOTE — ASSESSMENT & PLAN NOTE
Patient continues to experience pain in the low back area continue hydrocodone again dependence on the medication has been discussed with the patient

## 2023-05-08 ENCOUNTER — OFFICE VISIT (OUTPATIENT)
Dept: FAMILY MEDICINE CLINIC | Facility: CLINIC | Age: 73
End: 2023-05-08

## 2023-05-08 VITALS
HEART RATE: 78 BPM | HEIGHT: 65 IN | DIASTOLIC BLOOD PRESSURE: 76 MMHG | SYSTOLIC BLOOD PRESSURE: 126 MMHG | BODY MASS INDEX: 36.46 KG/M2 | OXYGEN SATURATION: 95 % | WEIGHT: 218.8 LBS

## 2023-05-08 DIAGNOSIS — M79.7 FIBROMYALGIA: ICD-10-CM

## 2023-05-08 DIAGNOSIS — G40.909 SEIZURE DISORDER (HCC): ICD-10-CM

## 2023-05-08 DIAGNOSIS — G89.4 CHRONIC PAIN SYNDROME: ICD-10-CM

## 2023-05-08 DIAGNOSIS — R07.89 CHEST TIGHTNESS: ICD-10-CM

## 2023-05-08 DIAGNOSIS — Z12.11 ENCOUNTER FOR SCREENING COLONOSCOPY: ICD-10-CM

## 2023-05-08 DIAGNOSIS — F41.9 ANXIETY: ICD-10-CM

## 2023-05-08 DIAGNOSIS — E03.9 ACQUIRED HYPOTHYROIDISM: Primary | ICD-10-CM

## 2023-05-08 DIAGNOSIS — T78.40XD ALLERGY, SUBSEQUENT ENCOUNTER: ICD-10-CM

## 2023-05-08 DIAGNOSIS — R32 URINARY INCONTINENCE, UNSPECIFIED TYPE: ICD-10-CM

## 2023-05-08 DIAGNOSIS — K21.9 GASTROESOPHAGEAL REFLUX DISEASE WITHOUT ESOPHAGITIS: ICD-10-CM

## 2023-05-08 DIAGNOSIS — F11.20 CONTINUOUS OPIOID DEPENDENCE (HCC): ICD-10-CM

## 2023-05-08 DIAGNOSIS — E66.01 OBESITY, MORBID (HCC): ICD-10-CM

## 2023-05-08 DIAGNOSIS — G43.909 MIGRAINE WITHOUT STATUS MIGRAINOSUS, NOT INTRACTABLE, UNSPECIFIED MIGRAINE TYPE: ICD-10-CM

## 2023-05-08 RX ORDER — DULOXETIN HYDROCHLORIDE 30 MG/1
30 CAPSULE, DELAYED RELEASE ORAL DAILY
Qty: 90 CAPSULE | Refills: 1 | Status: SHIPPED | OUTPATIENT
Start: 2023-05-08

## 2023-05-08 RX ORDER — MONTELUKAST SODIUM 10 MG/1
10 TABLET ORAL DAILY
Qty: 90 TABLET | Refills: 1 | Status: SHIPPED | OUTPATIENT
Start: 2023-05-08

## 2023-05-08 RX ORDER — LEVETIRACETAM 500 MG/1
500 TABLET ORAL 2 TIMES DAILY
Qty: 180 TABLET | Refills: 1 | Status: SHIPPED | OUTPATIENT
Start: 2023-05-08

## 2023-05-08 RX ORDER — LEVOTHYROXINE SODIUM 0.1 MG/1
100 TABLET ORAL DAILY
Qty: 90 TABLET | Refills: 1 | Status: SHIPPED | OUTPATIENT
Start: 2023-05-08

## 2023-05-08 RX ORDER — HYDROCODONE BITARTRATE AND ACETAMINOPHEN 5; 325 MG/1; MG/1
1 TABLET ORAL 2 TIMES DAILY PRN
Qty: 60 TABLET | Refills: 0 | Status: SHIPPED | OUTPATIENT
Start: 2023-05-08

## 2023-05-08 RX ORDER — PANTOPRAZOLE SODIUM 40 MG/1
40 TABLET, DELAYED RELEASE ORAL DAILY
Qty: 90 TABLET | Refills: 1 | Status: SHIPPED | OUTPATIENT
Start: 2023-05-08

## 2023-05-08 NOTE — PROGRESS NOTES
Assessment and Plan:     Problem List Items Addressed This Visit        Digestive    Gastroesophageal reflux disease without esophagitis    Relevant Medications    pantoprazole (PROTONIX) 40 mg tablet       Endocrine    Acquired hypothyroidism - Primary    Relevant Medications    levothyroxine 100 mcg tablet       Cardiovascular and Mediastinum    Migraines    Relevant Medications    DULoxetine (CYMBALTA) 30 mg delayed release capsule    levETIRAcetam (KEPPRA) 500 mg tablet       Nervous and Auditory    Seizure disorder (HCC)    Relevant Medications    levETIRAcetam (KEPPRA) 500 mg tablet       Other    Chronic pain syndrome    Allergies    Relevant Medications    montelukast (SINGULAIR) 10 mg tablet    Fibromyalgia    Continuous opioid dependence (HCC)    Obesity, morbid (HCC)    Chest tightness   Other Visit Diagnoses     Anxiety        Relevant Medications    DULoxetine (CYMBALTA) 30 mg delayed release capsule          Depression Screening and Follow-up Plan: Patient was screened for depression during today's encounter  They screened negative with a PHQ-2 score of 0  Urinary Incontinence Plan of Care: counseling topics discussed: practice Kegel (pelvic floor strengthening) exercises, use restroom every 2 hours, limit alcohol, caffeine, spicy foods, and acidic foods and limiting fluid intake 3-4 hours before bed  Referral was placed for Urology  Preventive health issues were discussed with patient, and age appropriate screening tests were ordered as noted in patient's After Visit Summary  Personalized health advice and appropriate referrals for health education or preventive services given if needed, as noted in patient's After Visit Summary  History of Present Illness:     Patient presents for a Medicare Wellness Visit    Patient is coming here for the follow-up evaluation annual wellness visit  She has chronic pain syndrome on pain medication    She is going to have her lab work done at the hospital soon along with the bone density test and mammogram   She is also going to be seen by the gastroenterologist regarding a colonoscopy it appears her last colonoscopy was done in 2018 we will get a copy from the gastroenterologist office  Medications reviewed and renewed  Patient Care Team:  Rey Mackenzie MD as PCP - General (Internal Medicine)     Review of Systems:     Review of Systems   Constitutional: Negative for chills and fever  HENT: Negative for ear pain and sore throat  Eyes: Negative for pain and visual disturbance  Respiratory: Negative for cough and shortness of breath  Cardiovascular: Negative for chest pain and palpitations  Gastrointestinal: Negative for abdominal pain and vomiting  Genitourinary: Negative for dysuria and hematuria  Musculoskeletal: Positive for arthralgias and back pain  Skin: Negative for color change and rash  Neurological: Negative for seizures and syncope  All other systems reviewed and are negative         Problem List:     Patient Active Problem List   Diagnosis   • Chronic pain syndrome   • Acquired hypothyroidism   • Seizure disorder ([facility] [address] )   • Gastroesophageal reflux disease without esophagitis   • Allergies   • Fibromyalgia   • Migraines   • Continuous opioid dependence ([facility] [address] )   • Obesity, morbid ([facility] [address] )   • Chest tightness      Past Medical and Surgical History:     Past Medical History:   Diagnosis Date   • Anxiety with depression    • Arm fracture, left     x2   • Arthritis    • Degenerative joint disease    • Encounter for immunization 10/18/2022   • Fibromyalgia    • GERD (gastroesophageal reflux disease)    • Hypertension    • Hypothyroidism    • Kidney stones    • Low back pain    • Seasonal allergies    • Seizure disorder [facility])      Past Surgical History:   Procedure Laterality Date   • COLONOSCOPY     • DILATION AND CURETTAGE OF UTERUS     • NECK SURGERY      Plate   • ORIF FOREARM FRACTURE Left    • SHOULDER SURGERY      x2   • TONSILLECTOMY        Family History:     Family History   Problem Relation Age of Onset   • Stroke Mother    • Heart disease Father       Social History:     Social History     Socioeconomic History   • Marital status:      Spouse name: None   • Number of children: None   • Years of education: None   • Highest education level: None   Occupational History   • None   Tobacco Use   • Smoking status: Former     Types: Cigarettes     Quit date:      Years since quittin 3     Passive exposure: Past   • Smokeless tobacco: Never   Substance and Sexual Activity   • Alcohol use: Yes     Comment: Socially/very rare   • Drug use: Never   • Sexual activity: None   Other Topics Concern   • None   Social History Narrative   • None     Social Determinants of Health     Financial Resource Strain: Low Risk    • Difficulty of Paying Living Expenses: Not hard at all   Food Insecurity: Not on file   Transportation Needs: No Transportation Needs   • Lack of Transportation (Medical): No   • Lack of Transportation (Non-Medical):  No   Physical Activity: Not on file   Stress: Not on file   Social Connections: Not on file   Intimate Partner Violence: Not on file   Housing Stability: Not on file      Medications and Allergies:     Current Outpatient Medications   Medication Sig Dispense Refill   • albuterol (PROVENTIL HFA,VENTOLIN HFA) 90 mcg/act inhaler Inhale 2 puffs every 6 (six) hours as needed for wheezing 8 g 1   • Cholecalciferol (Vitamin D3) 50 MCG (2000 UT) TABS Take 2,000 Units by mouth in the morning     • DULoxetine (CYMBALTA) 30 mg delayed release capsule Take 1 capsule (30 mg total) by mouth in the morning 90 capsule 1   • fluticasone (FLONASE) 50 mcg/act nasal spray USE 2 SPRAYS IN EACH NOSTRIL EVERY DAY 32 g 3   • HYDROcodone-acetaminophen (NORCO) 5-325 mg per tablet Take 1 tablet by mouth 2 (two) times a day as needed for pain Max Daily Amount: 2 tablets 60 tablet 0   • levETIRAcetam (KEPPRA) 500 mg tablet Take 1 tablet (500 mg total) by mouth 2 (two) times a day 180 tablet 1   • levothyroxine 100 mcg tablet Take 1 tablet (100 mcg total) by mouth in the morning Stop taking the 112 mcg of levothyroxine 90 tablet 1   • Magnesium 400 MG TABS Take 1 tablet (400 mg total) by mouth in the morning 90 tablet 0   • montelukast (SINGULAIR) 10 mg tablet Take 1 tablet (10 mg total) by mouth in the morning 90 tablet 1   • pantoprazole (PROTONIX) 40 mg tablet Take 1 tablet (40 mg total) by mouth daily 90 tablet 1   • vitamin B-12 (VITAMIN B-12) 1,000 mcg tablet Take 1 tablet (1,000 mcg total) by mouth daily 90 tablet 1     No current facility-administered medications for this visit  Allergies   Allergen Reactions   • Beta Adrenergic Blockers Shortness Of Breath   • Sulfamethoxazole-Trimethoprim Shortness Of Breath   • Meloxicam Confusion      Immunizations:     Immunization History   Administered Date(s) Administered   • COVID-19 MODERNA VACC 0 5 ML IM 04/16/2021, 05/18/2021   • Influenza, high dose seasonal 0 7 mL 10/18/2022      Health Maintenance:         Topic Date Due   • Hepatitis C Screening  Never done   • Breast Cancer Screening: Mammogram  Never done   • Colorectal Cancer Screening  Never done         Topic Date Due   • Pneumococcal Vaccine: 65+ Years (1 - PCV) Never done   • COVID-19 Vaccine (3 - Booster for Leo Jese series) 07/13/2021      Medicare Screening Tests and Risk Assessments:     Last Medicare Wellness visit information reviewed, patient interviewed and updates made to the record today  Health Risk Assessment:   Patient rates overall health as fair  Patient feels that their physical health rating is same  Patient is dissatisfied with their life  Eyesight was rated as same  Hearing was rated as slightly worse  Patient feels that their emotional and mental health rating is same  Patients states they are sometimes angry  Patient states they are always unusually tired/fatigued   Pain experienced in the last 7 days has been a lot  Patient's pain rating has been 8/10  Patient states that she has experienced no weight loss or gain in last 6 months  Depression Screening:   PHQ-2 Score: 0      Fall Risk Screening: In the past year, patient has experienced: no history of falling in past year      Urinary Incontinence Screening:   Patient has leaked urine accidently in the last six months  The patient has dribbling on occasion  Home Safety:  Patient has trouble with stairs inside or outside of their home  Patient has working smoke alarms and has working carbon monoxide detector  Home safety hazards include: none  Loud noises from neighbor and them smoking inside and blowing cig and weed in out sliding door    Nutrition:   Current diet is No Added Salt and Limited junk food  The patient enjoys a healthy diet with vegetables and protein  Occasional junk food  Medications:   Patient is currently taking over-the-counter supplements  OTC medications include: see medication list  Patient is able to manage medications  The patient is able to manage her medications  Activities of Daily Living (ADLs)/Instrumental Activities of Daily Living (IADLs):   Walk and transfer into and out of bed and chair?: Yes  Dress and groom yourself?: Yes    Bathe or shower yourself?: Yes    Feed yourself? Yes  Do your laundry/housekeeping?: Yes  Manage your money, pay your bills and track your expenses?: Yes  Make your own meals?: Yes    Do your own shopping?: Yes    ADL comments: The patient is able to manage her day-to-day activities without too much difficulty  Previous Hospitalizations:   Any hospitalizations or ED visits within the last 12 months?: No      Advance Care Planning:   Living will: No    Durable POA for healthcare: No    Advanced directive: No    Advanced directive counseling given: Yes    Five wishes given: Yes    Patient declined ACP directive: Yes      Comments: Patient is going to prepare a living will    Her son will be the power of  she is going to spell out about DNR  Cognitive Screening:   Provider or family/friend/caregiver concerned regarding cognition?: No    PREVENTIVE SCREENINGS      Cardiovascular Screening:    General: Risks and Benefits Discussed    Due for: Lipid Panel      Diabetes Screening:       Due for: Blood Glucose      Colorectal Cancer Screening:       Due for: Colonoscopy - Low Risk      Breast Cancer Screening:       Due for: Mammogram        Cervical Cancer Screening:    General: Risks and Benefits Discussed      Osteoporosis Screening:    General: History Osteoporosis and Risks and Benefits Discussed    Due for: Bone Density Ultrasound      Lung Cancer Screening:     General: Screening Not Indicated      Hepatitis C Screening:    General: Screening Not Indicated      Preventive Screening Comments: Patient is mammogram and DEXA scan  He is also going to be seen by the gastroenterologist for follow-up colonoscopy  Screening, Brief Intervention, and Referral to Treatment (SBIRT)    Screening  Typical number of drinks in a day: 0  Typical number of drinks in a week: 0  Interpretation: Low risk drinking behavior  AUDIT-C Screenin) How often did you have a drink containing alcohol in the past year? never  2) How many drinks did you have on a typical day when you were drinking in the past year? 0  3) How often did you have 6 or more drinks on one occasion in the past year? never    AUDIT-C Score: 0  Interpretation: Score 0-2 (female): Negative screen for alcohol misuse    Single Item Drug Screening:  How often have you used an illegal drug (including marijuana) or a prescription medication for non-medical reasons in the past year? never    Single Item Drug Screen Score: 0  Interpretation: Negative screen for possible drug use disorder    Brief Intervention  Alcohol & drug use screenings were reviewed  No concerns regarding substance use disorder identified       Review of Current "Opioid Use    Opioid Risk Tool (ORT) Interpretation: Complete Opioid Risk Tool (ORT)    Other Counseling Topics:   Car/seat belt/driving safety and calcium and vitamin D intake and regular weightbearing exercise  No results found  Physical Exam:     /76 (BP Location: Right arm, Patient Position: Sitting, Cuff Size: Standard)   Pulse 78   Ht 5' 5\" (1 651 m)   Wt 99 2 kg (218 lb 12 8 oz)   SpO2 95%   BMI 36 41 kg/m²     Physical Exam  Vitals and nursing note reviewed  Constitutional:       General: She is not in acute distress  Appearance: She is well-developed  HENT:      Head: Normocephalic and atraumatic  Eyes:      Conjunctiva/sclera: Conjunctivae normal    Cardiovascular:      Rate and Rhythm: Normal rate and regular rhythm  Heart sounds: No murmur heard  Pulmonary:      Effort: Pulmonary effort is normal  No respiratory distress  Breath sounds: Normal breath sounds  Abdominal:      Palpations: Abdomen is soft  Tenderness: There is no abdominal tenderness  Musculoskeletal:         General: No swelling  Cervical back: Neck supple  Comments: SLR about 30 degrees both sides  Skin:     General: Skin is warm and dry  Capillary Refill: Capillary refill takes less than 2 seconds  Neurological:      Mental Status: She is alert     Psychiatric:         Mood and Affect: Mood normal           Kasie Garner MD  "

## 2023-05-09 ENCOUNTER — TELEPHONE (OUTPATIENT)
Dept: ADMINISTRATIVE | Facility: OTHER | Age: 73
End: 2023-05-09

## 2023-05-09 NOTE — TELEPHONE ENCOUNTER
Upon review of the In Basket request and the patient's chart, initial outreach has been made via fax to facility  Please see Contacts section for details       Thank you  Geena Ames

## 2023-05-09 NOTE — LETTER
Procedure Request Form: Colonoscopy      Date Requested: 23  Patient: Wilmon Rily  Patient : 1950   Referring Provider: Lynnette Vizcarra, MD        Date of Procedure ______________________________       The above patient has informed us that they have completed their   most recent Colonoscopy at your facility  Please complete   this form and attach all corresponding procedure reports/results  Comments __________________________________________________________  ____________________________________________________________________  ____________________________________________________________________  ____________________________________________________________________    Facility Completing Procedure _________________________________________    Form Completed By (print name) _______________________________________      Signature __________________________________________________________      These reports are needed for  compliance  Please fax this completed form and a copy of the procedure report to our office located at [place] [address] as soon as possible to Fax [phone] attention Verta Boas: Phone [phone]    We thank you for your assistance in treating our mutual patient

## 2023-05-09 NOTE — LETTER
Procedure Request Form: Colonoscopy      Date Requested: 23  Patient: Anthony All  Patient : 1950   Referring Provider: Kaylen Elias, MD        Date of Procedure ______________________________       The above patient has informed us that they have completed their   most recent Colonoscopy at your facility  Please complete   this form and attach all corresponding procedure reports/results  Comments __________________________________________________________  ____________________________________________________________________  ____________________________________________________________________  ____________________________________________________________________    Facility Completing Procedure _________________________________________    Form Completed By (print name) _______________________________________      Signature __________________________________________________________      These reports are needed for  compliance  Please fax this completed form and a copy of the procedure report to our office located at [place] [phone] as soon as possible to Fax [phone] ruth Dubon Awkward: Phone [phone]    We thank you for your assistance in treating our mutual patient

## 2023-05-09 NOTE — TELEPHONE ENCOUNTER
----- Message from Agatha Ghosh sent at 5/8/2023  2:42 PM EDT -----  Regarding: care gap request  05/08/23 2:42 PM    Hello, our patient attached above has had CRC: Colonoscopy completed/performed  Please assist in updating the patient chart by PIETER keys, [phone]  The date of service is 10/4/2019       Thank you,  Agatha Ghosh  Replaced by [facility] AT [address]

## 2023-05-11 NOTE — TELEPHONE ENCOUNTER
As a follow-up, a second attempt has been made for outreach via fax to facility  Please see Contacts section for details      Thank you  Darrick Becerril

## 2023-05-15 ENCOUNTER — TELEPHONE (OUTPATIENT)
Dept: GASTROENTEROLOGY | Facility: CLINIC | Age: 73
End: 2023-05-15

## 2023-05-15 NOTE — TELEPHONE ENCOUNTER
Upon review of the In Basket request we were able to locate, review, and update the patient chart as requested for CRC: Colonoscopy  Any additional questions or concerns should be emailed to the Practice Liaisons via the appropriate education email address, please do not reply via In Basket      Thank you  Kika Johnson

## 2023-05-15 NOTE — TELEPHONE ENCOUNTER
As a final attempt, a third outreach has been made via telephone call to facility  Please see Contacts section for details  This encounter will be closed and completed by end of day  Should we receive the requested information because of previous outreach attempts, the requested patient's chart will be updated appropriately       Thank you  Thaddeus Knapp

## 2023-05-15 NOTE — TELEPHONE ENCOUNTER
Patients GI provider:  Dr Susy Clemons    Number to return call: [phone]    Reason for call: Ayaan Menard from [address] [place] [address] calling to request a copy of the pt's colonoscopy report   She would like it faxed to [phone]    Scheduled procedure/appointment date if applicable: N/A

## 2023-05-25 ENCOUNTER — OFFICE VISIT (OUTPATIENT)
Dept: FAMILY MEDICINE CLINIC | Facility: CLINIC | Age: 73
End: 2023-05-25

## 2023-05-25 VITALS
BODY MASS INDEX: 36.52 KG/M2 | DIASTOLIC BLOOD PRESSURE: 78 MMHG | HEIGHT: 65 IN | WEIGHT: 219.2 LBS | HEART RATE: 71 BPM | SYSTOLIC BLOOD PRESSURE: 128 MMHG | OXYGEN SATURATION: 94 %

## 2023-05-25 DIAGNOSIS — M79.7 FIBROMYALGIA: ICD-10-CM

## 2023-05-25 DIAGNOSIS — J01.00 ACUTE NON-RECURRENT MAXILLARY SINUSITIS: Primary | ICD-10-CM

## 2023-05-25 DIAGNOSIS — E03.9 ACQUIRED HYPOTHYROIDISM: ICD-10-CM

## 2023-05-25 DIAGNOSIS — F11.20 CONTINUOUS OPIOID DEPENDENCE (HCC): ICD-10-CM

## 2023-05-25 DIAGNOSIS — G89.4 CHRONIC PAIN SYNDROME: ICD-10-CM

## 2023-05-25 DIAGNOSIS — T78.40XD ALLERGY, SUBSEQUENT ENCOUNTER: ICD-10-CM

## 2023-05-25 DIAGNOSIS — G43.909 MIGRAINE WITHOUT STATUS MIGRAINOSUS, NOT INTRACTABLE, UNSPECIFIED MIGRAINE TYPE: ICD-10-CM

## 2023-05-25 RX ORDER — CEFUROXIME AXETIL 250 MG/1
250 TABLET ORAL EVERY 12 HOURS SCHEDULED
Qty: 20 TABLET | Refills: 0 | Status: SHIPPED | OUTPATIENT
Start: 2023-05-25 | End: 2023-06-04

## 2023-05-25 NOTE — PROGRESS NOTES
Office Visit Note  23     Heidi Chao 68 y o  female MRN: [id number]  : 1950    Assessment:     1  Acute non-recurrent maxillary sinusitis  Assessment & Plan:  Sinus congestion symptoms persistent for past 7 days progressively increasing we will start the patient on Ceftin to 50 mg twice a day continue to use the Flonase nasal spray    Orders:  -     cefuroxime (CEFTIN) 250 mg tablet; Take 1 tablet (250 mg total) by mouth every 12 (twelve) hours for 10 days    2  Acquired hypothyroidism    3  Allergy, subsequent encounter    4  Chronic pain syndrome    5  Continuous opioid dependence ([email or web address] )    6  Fibromyalgia    7  Migraine without status migrainosus, not intractable, unspecified migraine type             Discussion Summary and Plan: Today's care plan and medications were reviewed with patient in detail and all their questions answered to their satisfaction  Chief Complaint   Patient presents with   • Cold Like Symptoms     Patient complaining of sinus symptoms, coughing, fever? Luisito Lay        Subjective:  Patient is coming for evaluation regarding symptoms of sinus congestion postnasal dripping for the last 7 days progressively increasing now he has some cough also not able to bring up much phlegm earlier she had some throat discomfort  She felt she might have slight fever yesterday but not today no chest pain palpitation shortness of breath patient is using some NyQuil cough syrup  The following portions of the patient's history were reviewed and updated as appropriate: allergies, current medications, past family history, past medical history, past social history, past surgical history and problem list     Review of Systems   Constitutional: Negative for chills and fever  HENT: Positive for sinus pressure, sneezing and sore throat  Negative for ear pain  Eyes: Negative for pain and visual disturbance  Respiratory: Negative for cough and shortness of breath  Cardiovascular: Negative for chest pain and palpitations  Gastrointestinal: Negative for abdominal pain and vomiting  Genitourinary: Negative for dysuria and hematuria  Musculoskeletal: Negative for arthralgias and back pain  Skin: Negative for color change and rash  Neurological: Negative for seizures and syncope  All other systems reviewed and are negative          Historical Information   Patient Active Problem List   Diagnosis   • Chronic pain syndrome   • Acquired hypothyroidism   • Seizure disorder ([place] [address] )   • Gastroesophageal reflux disease without esophagitis   • Allergies   • Fibromyalgia   • Migraines   • Continuous opioid dependence ([place] [address] )   • Obesity, morbid (HCC)   • Chest tightness   • Acute non-recurrent maxillary sinusitis     Past Medical History:   Diagnosis Date   • Anxiety with depression    • Arm fracture, left     x2   • Arthritis    • Degenerative joint disease    • Encounter for immunization 10/18/2022   • Fibromyalgia    • GERD (gastroesophageal reflux disease)    • Hypertension    • Hypothyroidism    • Kidney stones    • Low back pain    • Seasonal allergies    • Seizure disorder ([place] [address] )      Past Surgical History:   Procedure Laterality Date   • COLONOSCOPY     • DILATION AND CURETTAGE OF UTERUS     • NECK SURGERY      Plate   • ORIF FOREARM FRACTURE Left    • SHOULDER SURGERY      x2   • TONSILLECTOMY       Social History     Substance and Sexual Activity   Alcohol Use Yes    Comment: Socially/very rare     Social History     Substance and Sexual Activity   Drug Use Never     Social History     Tobacco Use   Smoking Status Former   • Types: Cigarettes   • Quit date:    • Years since quittin 4   • Passive exposure: Past   Smokeless Tobacco Never     Family History   Problem Relation Age of Onset   • Stroke Mother    • Heart disease Father      Health Maintenance Due   Topic   • Hepatitis C Screening    • Breast Cancer Screening: Mammogram    • Osteoporosis Screening    • "Pneumococcal Vaccine: 65+ Years (1 - PCV)   • COVID-19 Vaccine (3 - Moderna series)   • Colorectal Cancer Screening       Meds/Allergies       Current Outpatient Medications:   •  albuterol (PROVENTIL HFA,VENTOLIN HFA) 90 mcg/act inhaler, Inhale 2 puffs every 6 (six) hours as needed for wheezing, Disp: 8 g, Rfl: 1  •  cefuroxime (CEFTIN) 250 mg tablet, Take 1 tablet (250 mg total) by mouth every 12 (twelve) hours for 10 days, Disp: 20 tablet, Rfl: 0  •  Cholecalciferol (Vitamin D3) 50 MCG (2000 UT) TABS, Take 2,000 Units by mouth in the morning, Disp: , Rfl:   •  DULoxetine (CYMBALTA) 30 mg delayed release capsule, Take 1 capsule (30 mg total) by mouth in the morning, Disp: 90 capsule, Rfl: 1  •  fluticasone (FLONASE) 50 mcg/act nasal spray, USE 2 SPRAYS IN EACH NOSTRIL EVERY DAY, Disp: 32 g, Rfl: 3  •  HYDROcodone-acetaminophen (NORCO) 5-325 mg per tablet, Take 1 tablet by mouth 2 (two) times a day as needed for pain Max Daily Amount: 2 tablets, Disp: 60 tablet, Rfl: 0  •  levETIRAcetam (KEPPRA) 500 mg tablet, Take 1 tablet (500 mg total) by mouth 2 (two) times a day, Disp: 180 tablet, Rfl: 1  •  levothyroxine 100 mcg tablet, Take 1 tablet (100 mcg total) by mouth in the morning Stop taking the 112 mcg of levothyroxine, Disp: 90 tablet, Rfl: 1  •  Magnesium 400 MG TABS, Take 1 tablet (400 mg total) by mouth in the morning, Disp: 90 tablet, Rfl: 0  •  montelukast (SINGULAIR) 10 mg tablet, Take 1 tablet (10 mg total) by mouth in the morning, Disp: 90 tablet, Rfl: 1  •  pantoprazole (PROTONIX) 40 mg tablet, Take 1 tablet (40 mg total) by mouth daily, Disp: 90 tablet, Rfl: 1  •  vitamin B-12 (VITAMIN B-12) 1,000 mcg tablet, Take 1 tablet (1,000 mcg total) by mouth daily, Disp: 90 tablet, Rfl: 1      Objective:    Vitals:   /78 (BP Location: Right arm, Patient Position: Sitting, Cuff Size: Large)   Pulse 71   Ht 5' 5\" (1 651 m)   Wt 99 4 kg (219 lb 3 2 oz)   SpO2 94%   BMI 36 48 kg/m²   Body mass index is " "36 48 kg/m²  Vitals:    05/25/23 1315   Weight: 99 4 kg (219 lb 3 2 oz)       Physical Exam  Vitals and nursing note reviewed  Constitutional:       Appearance: Normal appearance  HENT:      Mouth/Throat:      Mouth: Mucous membranes are moist    Cardiovascular:      Rate and Rhythm: Normal rate and regular rhythm  Heart sounds: Normal heart sounds  Pulmonary:      Effort: Pulmonary effort is normal       Breath sounds: Normal breath sounds  Musculoskeletal:      Cervical back: Normal range of motion and neck supple  Right lower leg: No edema  Left lower leg: No edema  Skin:     General: Skin is warm and dry  Neurological:      Mental Status: She is alert and oriented to person, place, and time  Lab Review   No visits with results within 2 Month(s) from this visit  Latest known visit with results is:   Transcribe Orders on 08/14/2017   Component Date Value Ref Range Status   • Color, UA 08/14/2017 Yellow   Final   • Clarity, UA 08/14/2017 Clear   Final   • Specific Gravity, UA 08/14/2017 <=1 005  1 000 - 1 030 Final   • pH, UA 08/14/2017 5 5  5 0 - 9 0 Final   • Leukocytes, UA 08/14/2017 Trace (A)  Negative Final   • Nitrite, UA 08/14/2017 Negative  Negative Final   • Protein, UA 08/14/2017 Negative  Negative mg/dl Final   • Glucose, UA 08/14/2017 Negative  Negative mg/dl Final   • Ketones, UA 08/14/2017 Negative  Negative mg/dl Final   • Urobilinogen, UA 08/14/2017 0 2  0 2, 1 0 E U /dl E U /dl Final   • Bilirubin, UA 08/14/2017 Negative  Negative Final   • Occult Blood, UA 08/14/2017 Small (A)  Negative Final   • RBC, UA 08/14/2017 0-1 (A)  None Seen /hpf Final   • WBC, UA 08/14/2017 4-10 (A)  None Seen /hpf Final   • Epithelial Cells 08/14/2017 Occasional  None Seen, Occasional /hpf Final   • Bacteria, UA 08/14/2017 Occasional  None Seen, Occasional /hpf Final         Amelie Valderrama MD        \"This note has been constructed using a voice recognition system  Therefore there " "may be syntax, spelling, and/or grammatical errors  Please call if you have any questions   \"  "

## 2023-05-25 NOTE — ASSESSMENT & PLAN NOTE
Sinus congestion symptoms persistent for past 7 days progressively increasing we will start the patient on Ceftin to 50 mg twice a day continue to use the Flonase nasal spray

## 2023-05-30 ENCOUNTER — RA CDI HCC (OUTPATIENT)
Dept: OTHER | Facility: HOSPITAL | Age: 73
End: 2023-05-30

## 2023-06-08 ENCOUNTER — OFFICE VISIT (OUTPATIENT)
Dept: FAMILY MEDICINE CLINIC | Facility: CLINIC | Age: 73
End: 2023-06-08
Payer: MEDICARE

## 2023-06-08 VITALS
HEIGHT: 65 IN | WEIGHT: 218.6 LBS | DIASTOLIC BLOOD PRESSURE: 76 MMHG | OXYGEN SATURATION: 96 % | BODY MASS INDEX: 36.42 KG/M2 | SYSTOLIC BLOOD PRESSURE: 120 MMHG | HEART RATE: 72 BPM

## 2023-06-08 DIAGNOSIS — M79.7 FIBROMYALGIA: ICD-10-CM

## 2023-06-08 DIAGNOSIS — F11.20 CONTINUOUS OPIOID DEPENDENCE (HCC): ICD-10-CM

## 2023-06-08 DIAGNOSIS — G89.4 CHRONIC PAIN SYNDROME: Primary | ICD-10-CM

## 2023-06-08 DIAGNOSIS — K21.9 GASTROESOPHAGEAL REFLUX DISEASE WITHOUT ESOPHAGITIS: ICD-10-CM

## 2023-06-08 DIAGNOSIS — E03.9 ACQUIRED HYPOTHYROIDISM: ICD-10-CM

## 2023-06-08 DIAGNOSIS — E66.01 OBESITY, MORBID (HCC): ICD-10-CM

## 2023-06-08 DIAGNOSIS — G40.909 SEIZURE DISORDER (HCC): ICD-10-CM

## 2023-06-08 DIAGNOSIS — G43.909 MIGRAINE WITHOUT STATUS MIGRAINOSUS, NOT INTRACTABLE, UNSPECIFIED MIGRAINE TYPE: ICD-10-CM

## 2023-06-08 PROBLEM — R07.89 CHEST TIGHTNESS: Status: RESOLVED | Noted: 2023-03-02 | Resolved: 2023-06-08

## 2023-06-08 PROBLEM — J01.00 ACUTE NON-RECURRENT MAXILLARY SINUSITIS: Status: RESOLVED | Noted: 2023-05-25 | Resolved: 2023-06-08

## 2023-06-08 PROCEDURE — 99213 OFFICE O/P EST LOW 20 MIN: CPT | Performed by: INTERNAL MEDICINE

## 2023-06-08 RX ORDER — HYDROCODONE BITARTRATE AND ACETAMINOPHEN 5; 325 MG/1; MG/1
1 TABLET ORAL 2 TIMES DAILY PRN
Qty: 60 TABLET | Refills: 0 | Status: SHIPPED | OUTPATIENT
Start: 2023-06-08

## 2023-06-08 NOTE — ASSESSMENT & PLAN NOTE
Patient with chronic pain syndrome in the low back area now she is experiencing more in the right side of the low back area in the sacroiliac joint  In the past she had received an injection in that site I recommended that she be reevaluated with orthopedics again she might need another injection meanwhile continue the pain medication hydrocodone for severe pain

## 2023-06-08 NOTE — ASSESSMENT & PLAN NOTE
Patient has been on Norco for a long time we will refer the patient back to the orthopedic for possible injection in the sacroiliac joint area

## 2023-06-08 NOTE — ASSESSMENT & PLAN NOTE
Continue levothyroxine 100 mcg follow-up with repeat lab which I had ordered earlier but patient has not gone for it yet

## 2023-06-08 NOTE — PROGRESS NOTES
Office Visit Note  23     Lianna Chao 68 y o  female MRN: [id number]  : 1950    Assessment:     1  Chronic pain syndrome  Assessment & Plan:  Patient with chronic pain syndrome in the low back area now she is experiencing more in the right side of the low back area in the sacroiliac joint  In the past she had received an injection in that site I recommended that she be reevaluated with orthopedics again she might need another injection meanwhile continue the pain medication hydrocodone for severe pain  Orders:  -     HYDROcodone-acetaminophen (NORCO) 5-325 mg per tablet; Take 1 tablet by mouth 2 (two) times a day as needed for pain Max Daily Amount: 2 tablets    2  Acquired hypothyroidism  Assessment & Plan:  Continue levothyroxine 100 mcg follow-up with repeat lab which I had ordered earlier but patient has not gone for it yet      3  Continuous opioid dependence ([facility] )  Assessment & Plan:  Patient has been on Norco for a long time we will refer the patient back to the orthopedic for possible injection in the sacroiliac joint area  4  Fibromyalgia    5  Gastroesophageal reflux disease without esophagitis    6  Migraine without status migrainosus, not intractable, unspecified migraine type    7  Obesity, morbid ([facility] [address] )    8  Seizure disorder [facility])             Discussion Summary and Plan: Today's care plan and medications were reviewed with patient in detail and all their questions answered to their satisfaction  Chief Complaint   Patient presents with   • Follow-up     F/U  Mily Mccray        Subjective:  Patient is coming in for a follow-up evaluation regarding the pain in the low back area patient is experiencing more so in the sacroiliac joint on the right side  In the past she had received an injection on that side with good relief    Since the patient's pain is getting worse recommend patient to be seen once with orthopedic surgeon who had seen her in the past regarding the injection  Meanwhile would renew her pain medications also  All other medications reviewed recommend strongly to go for the blood work which I have ordered previously  The following portions of the patient's history were reviewed and updated as appropriate: allergies, current medications, past family history, past medical history, past social history, past surgical history and problem list     Review of Systems   Constitutional: Negative for chills and fever  HENT: Negative for ear pain and sore throat  Eyes: Negative for pain and visual disturbance  Respiratory: Negative for cough and shortness of breath  Cardiovascular: Negative for chest pain and palpitations  Gastrointestinal: Negative for abdominal pain and vomiting  Genitourinary: Negative for dysuria and hematuria  Musculoskeletal: Positive for arthralgias and back pain  Skin: Negative for color change and rash  Neurological: Negative for seizures and syncope  All other systems reviewed and are negative          Historical Information   Patient Active Problem List   Diagnosis   • Chronic pain syndrome   • Acquired hypothyroidism   • Seizure disorder ([facility] )   • Gastroesophageal reflux disease without esophagitis   • Allergies   • Fibromyalgia   • Migraines   • Continuous opioid dependence ([facility] )   • Obesity, morbid ([facility] )     Past Medical History:   Diagnosis Date   • Anxiety with depression    • Arm fracture, left     x2   • Arthritis    • Degenerative joint disease    • Encounter for immunization 10/18/2022   • Fibromyalgia    • GERD (gastroesophageal reflux disease)    • Hypertension    • Hypothyroidism    • Kidney stones    • Low back pain    • Seasonal allergies    • Seizure disorder [facility])      Past Surgical History:   Procedure Laterality Date   • COLONOSCOPY     • DILATION AND CURETTAGE OF UTERUS     • NECK SURGERY      Plate   • ORIF FOREARM FRACTURE Left    • SHOULDER SURGERY      x2   • TONSILLECTOMY       Social History Substance and Sexual Activity   Alcohol Use Yes    Comment: Socially/very rare     Social History     Substance and Sexual Activity   Drug Use Never     Social History     Tobacco Use   Smoking Status Former   • Types: Cigarettes   • Quit date:    • Years since quittin 4   • Passive exposure: Past   Smokeless Tobacco Never     Family History   Problem Relation Age of Onset   • Stroke Mother    • Heart disease Father      Health Maintenance Due   Topic   • Hepatitis C Screening    • Breast Cancer Screening: Mammogram    • Osteoporosis Screening    • Pneumococcal Vaccine: 65+ Years (1 - PCV)   • COVID-19 Vaccine (3 - Moderna series)   • Colorectal Cancer Screening       Meds/Allergies       Current Outpatient Medications:   •  albuterol (PROVENTIL HFA,VENTOLIN HFA) 90 mcg/act inhaler, Inhale 2 puffs every 6 (six) hours as needed for wheezing, Disp: 8 g, Rfl: 1  •  Cholecalciferol (Vitamin D3) 50 MCG (2000 UT) TABS, Take 2,000 Units by mouth in the morning, Disp: , Rfl:   •  DULoxetine (CYMBALTA) 30 mg delayed release capsule, Take 1 capsule (30 mg total) by mouth in the morning, Disp: 90 capsule, Rfl: 1  •  fluticasone (FLONASE) 50 mcg/act nasal spray, USE 2 SPRAYS IN EACH NOSTRIL EVERY DAY, Disp: 32 g, Rfl: 3  •  HYDROcodone-acetaminophen (NORCO) 5-325 mg per tablet, Take 1 tablet by mouth 2 (two) times a day as needed for pain Max Daily Amount: 2 tablets, Disp: 60 tablet, Rfl: 0  •  levETIRAcetam (KEPPRA) 500 mg tablet, Take 1 tablet (500 mg total) by mouth 2 (two) times a day, Disp: 180 tablet, Rfl: 1  •  levothyroxine 100 mcg tablet, Take 1 tablet (100 mcg total) by mouth in the morning Stop taking the 112 mcg of levothyroxine, Disp: 90 tablet, Rfl: 1  •  Magnesium 400 MG TABS, Take 1 tablet (400 mg total) by mouth in the morning, Disp: 90 tablet, Rfl: 0  •  montelukast (SINGULAIR) 10 mg tablet, Take 1 tablet (10 mg total) by mouth in the morning, Disp: 90 tablet, Rfl: 1  •  pantoprazole (PROTONIX) 40 "mg tablet, Take 1 tablet (40 mg total) by mouth daily, Disp: 90 tablet, Rfl: 1  •  vitamin B-12 (VITAMIN B-12) 1,000 mcg tablet, Take 1 tablet (1,000 mcg total) by mouth daily, Disp: 90 tablet, Rfl: 1      Objective:    Vitals:   /76 (Cuff Size: Standard)   Pulse 72   Ht 5' 5\" (1 651 m)   Wt 99 2 kg (218 lb 9 6 oz)   SpO2 96%   BMI 36 38 kg/m²   Body mass index is 36 38 kg/m²  Vitals:    06/08/23 1105   Weight: 99 2 kg (218 lb 9 6 oz)       Physical Exam  Vitals and nursing note reviewed  Constitutional:       Appearance: Normal appearance  Cardiovascular:      Rate and Rhythm: Normal rate and regular rhythm  Heart sounds: Normal heart sounds  Pulmonary:      Effort: Pulmonary effort is normal       Breath sounds: Normal breath sounds  Musculoskeletal:      Cervical back: Normal range of motion and neck supple  Right lower leg: No edema  Left lower leg: No edema  Comments: Tenderness in the low back area on the right side   Neurological:      Mental Status: She is alert and oriented to person, place, and time  Lab Review   No visits with results within 2 Month(s) from this visit     Latest known visit with results is:   Transcribe Orders on 08/14/2017   Component Date Value Ref Range Status   • Color, UA 08/14/2017 Yellow   Final   • Clarity, UA 08/14/2017 Clear   Final   • Specific Gravity, UA 08/14/2017 <=1 005  1 000 - 1 030 Final   • pH, UA 08/14/2017 5 5  5 0 - 9 0 Final   • Leukocytes, UA 08/14/2017 Trace (A)  Negative Final   • Nitrite, UA 08/14/2017 Negative  Negative Final   • Protein, UA 08/14/2017 Negative  Negative mg/dl Final   • Glucose, UA 08/14/2017 Negative  Negative mg/dl Final   • Ketones, UA 08/14/2017 Negative  Negative mg/dl Final   • Urobilinogen, UA 08/14/2017 0 2  0 2, 1 0 E U /dl E U /dl Final   • Bilirubin, UA 08/14/2017 Negative  Negative Final   • Occult Blood, UA 08/14/2017 Small (A)  Negative Final   • RBC, UA 08/14/2017 0-1 (A)  None Seen " "/hpf Final   • WBC, UA 08/14/2017 4-10 (A)  None Seen /hpf Final   • Epithelial Cells 08/14/2017 Occasional  None Seen, Occasional /hpf Final   • Bacteria, UA 08/14/2017 Occasional  None Seen, Occasional /hpf Final         David Parra MD        \"This note has been constructed using a voice recognition system  Therefore there may be syntax, spelling, and/or grammatical errors  Please call if you have any questions   \"  "

## 2023-06-28 ENCOUNTER — RA CDI HCC (OUTPATIENT)
Dept: OTHER | Facility: HOSPITAL | Age: 73
End: 2023-06-28

## 2023-07-06 ENCOUNTER — OFFICE VISIT (OUTPATIENT)
Dept: FAMILY MEDICINE CLINIC | Facility: CLINIC | Age: 73
End: 2023-07-06
Payer: MEDICARE

## 2023-07-06 VITALS
BODY MASS INDEX: 36.55 KG/M2 | SYSTOLIC BLOOD PRESSURE: 124 MMHG | DIASTOLIC BLOOD PRESSURE: 74 MMHG | WEIGHT: 219.4 LBS | HEIGHT: 65 IN | OXYGEN SATURATION: 95 % | HEART RATE: 73 BPM

## 2023-07-06 DIAGNOSIS — T78.40XD ALLERGY, SUBSEQUENT ENCOUNTER: ICD-10-CM

## 2023-07-06 DIAGNOSIS — F11.20 CONTINUOUS OPIOID DEPENDENCE (HCC): ICD-10-CM

## 2023-07-06 DIAGNOSIS — E66.01 OBESITY, MORBID (HCC): ICD-10-CM

## 2023-07-06 DIAGNOSIS — G89.4 CHRONIC PAIN SYNDROME: Primary | ICD-10-CM

## 2023-07-06 DIAGNOSIS — K21.9 GASTROESOPHAGEAL REFLUX DISEASE WITHOUT ESOPHAGITIS: ICD-10-CM

## 2023-07-06 DIAGNOSIS — E03.9 ACQUIRED HYPOTHYROIDISM: ICD-10-CM

## 2023-07-06 DIAGNOSIS — G40.909 SEIZURE DISORDER (HCC): ICD-10-CM

## 2023-07-06 DIAGNOSIS — M79.7 FIBROMYALGIA: ICD-10-CM

## 2023-07-06 DIAGNOSIS — G43.909 MIGRAINE WITHOUT STATUS MIGRAINOSUS, NOT INTRACTABLE, UNSPECIFIED MIGRAINE TYPE: ICD-10-CM

## 2023-07-06 PROCEDURE — 99213 OFFICE O/P EST LOW 20 MIN: CPT | Performed by: INTERNAL MEDICINE

## 2023-07-06 RX ORDER — HYDROCODONE BITARTRATE AND ACETAMINOPHEN 5; 325 MG/1; MG/1
1 TABLET ORAL 2 TIMES DAILY PRN
Qty: 60 TABLET | Refills: 0 | Status: SHIPPED | OUTPATIENT
Start: 2023-07-06

## 2023-07-06 NOTE — ASSESSMENT & PLAN NOTE
Patient with chronic low back pain now recently has developed increasing discomfort below the right scapula secondary to spasm got better we will monitor meanwhile continue pain medications.   He is also seen by the orthopedics they wanted to give him an injection but she has to go to [facility] which she could not at this time

## 2023-07-06 NOTE — PROGRESS NOTES
Office Visit Note  23     Aries Durbin 68 y.o. female MRN: [id number]  : 1950    Assessment:     1. Chronic pain syndrome  Assessment & Plan:  Patient with chronic low back pain now recently has developed increasing discomfort below the right scapula secondary to spasm got better we will monitor meanwhile continue pain medications. He is also seen by the orthopedics they wanted to give him an injection but she has to go to [facility] which she could not at this time      2. Seizure disorder ([address])  Assessment & Plan:  Continue Keppra 500 mg 2 times a day      3. Obesity, morbid ([address])  Assessment & Plan:  BMI is 36.5 and again emphasized regarding diet exercise lifestyle modification. 4. Migraine without status migrainosus, not intractable, unspecified migraine type  Assessment & Plan:  Patient occasionally gets the migraine headaches stable      5. Gastroesophageal reflux disease without esophagitis  Assessment & Plan:  Patient is on Protonix 40 mg daily      6. Fibromyalgia  Assessment & Plan:  Continue Cymbalta      7. Continuous opioid dependence ([address])  Assessment & Plan:  Patient on pain medication Norco 2 times a day for her low back pain discussed with the patient regarding dependence      8. Allergy, subsequent encounter  Assessment & Plan:  Continue Flonase and Singulair      9. Acquired hypothyroidism  Assessment & Plan:  Continue 100 mcg of levothyroxine daily follow-up with the lab work               Discussion Summary and Plan: Today's care plan and medications were reviewed with patient in detail and all their questions answered to their satisfaction. Chief Complaint   Patient presents with   • Follow-up      Subjective:  Patient is coming here for a follow up evaluation regarding her low back pain chronic pain syndrome.   She had developed pain discomfort in the right side below the scapula after she has lifted any heavy object and for 2 weeks she was having discomfort and pain could not function. Patient has not gone for the lab work yet she is also due to get mammogram DEXA scan and colonoscopy. Medications reviewed. The following portions of the patient's history were reviewed and updated as appropriate: allergies, current medications, past family history, past medical history, past social history, past surgical history and problem list.    Review of Systems   Constitutional: Negative for chills and fever. HENT: Negative for ear pain and sore throat. Eyes: Negative for pain and visual disturbance. Respiratory: Negative for cough and shortness of breath. Cardiovascular: Negative for chest pain and palpitations. Gastrointestinal: Negative for abdominal pain and vomiting. Genitourinary: Negative for dysuria and hematuria. Musculoskeletal: Positive for arthralgias and back pain. Skin: Negative for color change and rash. Neurological: Negative for seizures and syncope. Psychiatric/Behavioral: The patient is nervous/anxious. All other systems reviewed and are negative.         Historical Information   Patient Active Problem List   Diagnosis   • Chronic pain syndrome   • Acquired hypothyroidism   • Seizure disorder ([address])   • Gastroesophageal reflux disease without esophagitis   • Allergies   • Fibromyalgia   • Migraines   • Continuous opioid dependence ([address])   • Obesity, morbid ([address])     Past Medical History:   Diagnosis Date   • Anxiety with depression    • Arm fracture, left     x2   • Arthritis    • Degenerative joint disease    • Encounter for immunization 10/18/2022   • Fibromyalgia    • GERD (gastroesophageal reflux disease)    • Hypertension    • Hypothyroidism    • Kidney stones    • Low back pain    • Seasonal allergies    • Seizure disorder [facility])      Past Surgical History:   Procedure Laterality Date   • COLONOSCOPY     • DILATION AND CURETTAGE OF UTERUS     • NECK SURGERY      Plate   • ORIF FOREARM FRACTURE Left    • SHOULDER SURGERY      x2   • TONSILLECTOMY       Social History     Substance and Sexual Activity   Alcohol Use Yes    Comment: Socially/very rare     Social History     Substance and Sexual Activity   Drug Use Never     Social History     Tobacco Use   Smoking Status Former   • Types: Cigarettes   • Quit date:    • Years since quittin.5   • Passive exposure: Past   Smokeless Tobacco Never     Family History   Problem Relation Age of Onset   • Stroke Mother    • Heart disease Father      Health Maintenance Due   Topic   • Hepatitis C Screening    • Breast Cancer Screening: Mammogram    • Osteoporosis Screening    • Pneumococcal Vaccine: 65+ Years (1 - PCV)   • COVID-19 Vaccine (3 - Moderna series)   • Colorectal Cancer Screening    • Influenza Vaccine (1)      Meds/Allergies       Current Outpatient Medications:   •  albuterol (PROVENTIL HFA,VENTOLIN HFA) 90 mcg/act inhaler, Inhale 2 puffs every 6 (six) hours as needed for wheezing, Disp: 8 g, Rfl: 1  •  Cholecalciferol (Vitamin D3) 50 MCG (2000 UT) TABS, Take 2,000 Units by mouth in the morning, Disp: , Rfl:   •  DULoxetine (CYMBALTA) 30 mg delayed release capsule, Take 1 capsule (30 mg total) by mouth in the morning, Disp: 90 capsule, Rfl: 1  •  fluticasone (FLONASE) 50 mcg/act nasal spray, USE 2 SPRAYS IN EACH NOSTRIL EVERY DAY, Disp: 32 g, Rfl: 3  •  HYDROcodone-acetaminophen (NORCO) 5-325 mg per tablet, Take 1 tablet by mouth 2 (two) times a day as needed for pain Max Daily Amount: 2 tablets, Disp: 60 tablet, Rfl: 0  •  levETIRAcetam (KEPPRA) 500 mg tablet, Take 1 tablet (500 mg total) by mouth 2 (two) times a day, Disp: 180 tablet, Rfl: 1  •  levothyroxine 100 mcg tablet, Take 1 tablet (100 mcg total) by mouth in the morning Stop taking the 112 mcg of levothyroxine, Disp: 90 tablet, Rfl: 1  •  Magnesium 400 MG TABS, Take 1 tablet (400 mg total) by mouth in the morning, Disp: 90 tablet, Rfl: 0  •  montelukast (SINGULAIR) 10 mg tablet, Take 1 tablet (10 mg total) by mouth in the morning, Disp: 90 tablet, Rfl: 1  •  pantoprazole (PROTONIX) 40 mg tablet, Take 1 tablet (40 mg total) by mouth daily, Disp: 90 tablet, Rfl: 1  •  vitamin B-12 (VITAMIN B-12) 1,000 mcg tablet, Take 1 tablet (1,000 mcg total) by mouth daily, Disp: 90 tablet, Rfl: 1      Objective:    Vitals:   /74 (BP Location: Right arm, Patient Position: Sitting, Cuff Size: Standard)   Pulse 73   Ht 5' 5" (1.651 m)   Wt 99.5 kg (219 lb 6.4 oz)   SpO2 95%   BMI 36.51 kg/m²   Body mass index is 36.51 kg/m². Vitals:    07/06/23 1103   Weight: 99.5 kg (219 lb 6.4 oz)       Physical Exam  Vitals and nursing note reviewed. Constitutional:       Appearance: Normal appearance. Cardiovascular:      Rate and Rhythm: Normal rate and regular rhythm. Heart sounds: Normal heart sounds. Pulmonary:      Effort: Pulmonary effort is normal.      Breath sounds: Normal breath sounds. Abdominal:      General: Abdomen is flat. Palpations: Abdomen is soft. Musculoskeletal:      Cervical back: Normal range of motion and neck supple. Right lower leg: No edema. Left lower leg: No edema. Comments: SLR about 20 degrees on the right side 40 on the left side some tenderness noted in the right side in the back. Skin:     General: Skin is warm and dry. Neurological:      Mental Status: She is alert and oriented to person, place, and time. Lab Review   No visits with results within 2 Month(s) from this visit.    Latest known visit with results is:   Transcribe Orders on 08/14/2017   Component Date Value Ref Range Status   • Color, UA 08/14/2017 Yellow   Final   • Clarity, UA 08/14/2017 Clear   Final   • Specific Gravity, UA 08/14/2017 <=1.005  1.000 - 1.030 Final   • pH, UA 08/14/2017 5.5  5.0 - 9.0 Final   • Leukocytes, UA 08/14/2017 Trace (A)  Negative Final   • Nitrite, UA 08/14/2017 Negative  Negative Final   • Protein, UA 08/14/2017 Negative  Negative mg/dl Final   • Glucose, UA 08/14/2017 Negative  Negative mg/dl Final   • Ketones, UA 08/14/2017 Negative  Negative mg/dl Final   • Urobilinogen, UA 08/14/2017 0.2  0.2, 1.0 E.U./dl E.U./dl Final   • Bilirubin, UA 08/14/2017 Negative  Negative Final   • Occult Blood, UA 08/14/2017 Small (A)  Negative Final   • RBC, UA 08/14/2017 0-1 (A)  None Seen /hpf Final   • WBC, UA 08/14/2017 4-10 (A)  None Seen /hpf Final   • Epithelial Cells 08/14/2017 Occasional  None Seen, Occasional /hpf Final   • Bacteria, UA 08/14/2017 Occasional  None Seen, Occasional /hpf Final         Alphonso White MD        "This note has been constructed using a voice recognition system. Therefore there may be syntax, spelling, and/or grammatical errors.  Please call if you have any questions. "

## 2023-07-06 NOTE — ASSESSMENT & PLAN NOTE
Patient on pain medication Norco 2 times a day for her low back pain discussed with the patient regarding dependence

## 2023-07-27 LAB
ALBUMIN SERPL-MCNC: 3.9 G/DL (ref 3.6–5.1)
ALBUMIN/GLOB SERPL: 1.4 (CALC) (ref 1–2.5)
ALP SERPL-CCNC: 65 U/L (ref 37–153)
ALT SERPL-CCNC: 18 U/L (ref 6–29)
APPEARANCE UR: CLEAR
AST SERPL-CCNC: 17 U/L (ref 10–35)
BACTERIA UR QL AUTO: ABNORMAL /HPF
BASOPHILS # BLD AUTO: 18 CELLS/UL (ref 0–200)
BASOPHILS NFR BLD AUTO: 0.3 %
BILIRUB SERPL-MCNC: 0.4 MG/DL (ref 0.2–1.2)
BILIRUB UR QL STRIP: NEGATIVE
BUN SERPL-MCNC: 15 MG/DL (ref 7–25)
BUN/CREAT SERPL: ABNORMAL (CALC) (ref 6–22)
CALCIUM SERPL-MCNC: 9.5 MG/DL (ref 8.6–10.4)
CHLORIDE SERPL-SCNC: 103 MMOL/L (ref 98–110)
CHOLEST SERPL-MCNC: 184 MG/DL
CHOLEST/HDLC SERPL: 3 (CALC)
CO2 SERPL-SCNC: 33 MMOL/L (ref 20–32)
COLOR UR: YELLOW
CREAT SERPL-MCNC: 0.97 MG/DL (ref 0.6–1)
EOSINOPHIL # BLD AUTO: 153 CELLS/UL (ref 15–500)
EOSINOPHIL NFR BLD AUTO: 2.6 %
ERYTHROCYTE [DISTWIDTH] IN BLOOD BY AUTOMATED COUNT: 13 % (ref 11–15)
GFR/BSA.PRED SERPLBLD CYS-BASED-ARV: 62 ML/MIN/1.73M2
GLOBULIN SER CALC-MCNC: 2.8 G/DL (CALC) (ref 1.9–3.7)
GLUCOSE SERPL-MCNC: 96 MG/DL (ref 65–99)
GLUCOSE UR QL STRIP: NEGATIVE
HBA1C MFR BLD: 5.4 % OF TOTAL HGB
HCT VFR BLD AUTO: 40.8 % (ref 35–45)
HDLC SERPL-MCNC: 62 MG/DL
HGB BLD-MCNC: 13.7 G/DL (ref 11.7–15.5)
HGB UR QL STRIP: ABNORMAL
HYALINE CASTS #/AREA URNS LPF: ABNORMAL /LPF
KETONES UR QL STRIP: NEGATIVE
LDLC SERPL CALC-MCNC: 105 MG/DL (CALC)
LEUKOCYTE ESTERASE UR QL STRIP: ABNORMAL
LYMPHOCYTES # BLD AUTO: 1788 CELLS/UL (ref 850–3900)
LYMPHOCYTES NFR BLD AUTO: 30.3 %
MCH RBC QN AUTO: 30.3 PG (ref 27–33)
MCHC RBC AUTO-ENTMCNC: 33.6 G/DL (ref 32–36)
MCV RBC AUTO: 90.3 FL (ref 80–100)
MONOCYTES # BLD AUTO: 696 CELLS/UL (ref 200–950)
MONOCYTES NFR BLD AUTO: 11.8 %
NEUTROPHILS # BLD AUTO: 3245 CELLS/UL (ref 1500–7800)
NEUTROPHILS NFR BLD AUTO: 55 %
NITRITE UR QL STRIP: NEGATIVE
NONHDLC SERPL-MCNC: 122 MG/DL (CALC)
PH UR STRIP: 6 [PH] (ref 5–8)
PLATELET # BLD AUTO: 211 THOUSAND/UL (ref 140–400)
PMV BLD REES-ECKER: 12.6 FL (ref 7.5–12.5)
POTASSIUM SERPL-SCNC: 4.3 MMOL/L (ref 3.5–5.3)
PROT SERPL-MCNC: 6.7 G/DL (ref 6.1–8.1)
PROT UR QL STRIP: NEGATIVE
RBC # BLD AUTO: 4.52 MILLION/UL (ref 3.8–5.1)
RBC #/AREA URNS HPF: ABNORMAL /HPF
SODIUM SERPL-SCNC: 142 MMOL/L (ref 135–146)
SP GR UR STRIP: 1.01 (ref 1–1.03)
SQUAMOUS #/AREA URNS HPF: ABNORMAL /HPF
T4 FREE SERPL-MCNC: 1.5 NG/DL (ref 0.8–1.8)
TRIGL SERPL-MCNC: 83 MG/DL
TSH SERPL-ACNC: 0.37 MIU/L (ref 0.4–4.5)
WBC # BLD AUTO: 5.9 THOUSAND/UL (ref 3.8–10.8)
WBC #/AREA URNS HPF: ABNORMAL /HPF

## 2023-08-04 ENCOUNTER — OFFICE VISIT (OUTPATIENT)
Dept: FAMILY MEDICINE CLINIC | Facility: CLINIC | Age: 73
End: 2023-08-04
Payer: MEDICARE

## 2023-08-04 VITALS
BODY MASS INDEX: 36.82 KG/M2 | HEART RATE: 71 BPM | DIASTOLIC BLOOD PRESSURE: 78 MMHG | WEIGHT: 221 LBS | HEIGHT: 65 IN | SYSTOLIC BLOOD PRESSURE: 128 MMHG | OXYGEN SATURATION: 95 %

## 2023-08-04 DIAGNOSIS — R82.90 ABNORMAL FINDING ON URINALYSIS: Primary | ICD-10-CM

## 2023-08-04 DIAGNOSIS — E66.01 OBESITY, MORBID (HCC): ICD-10-CM

## 2023-08-04 DIAGNOSIS — G40.909 SEIZURE DISORDER (HCC): ICD-10-CM

## 2023-08-04 DIAGNOSIS — T78.40XD ALLERGY, SUBSEQUENT ENCOUNTER: ICD-10-CM

## 2023-08-04 DIAGNOSIS — G89.4 CHRONIC PAIN SYNDROME: ICD-10-CM

## 2023-08-04 DIAGNOSIS — G43.909 MIGRAINE WITHOUT STATUS MIGRAINOSUS, NOT INTRACTABLE, UNSPECIFIED MIGRAINE TYPE: ICD-10-CM

## 2023-08-04 DIAGNOSIS — K21.9 GASTROESOPHAGEAL REFLUX DISEASE WITHOUT ESOPHAGITIS: ICD-10-CM

## 2023-08-04 DIAGNOSIS — N39.0 URINARY TRACT INFECTION WITHOUT HEMATURIA, SITE UNSPECIFIED: ICD-10-CM

## 2023-08-04 DIAGNOSIS — M79.7 FIBROMYALGIA: ICD-10-CM

## 2023-08-04 DIAGNOSIS — F11.20 CONTINUOUS OPIOID DEPENDENCE (HCC): ICD-10-CM

## 2023-08-04 DIAGNOSIS — E03.9 ACQUIRED HYPOTHYROIDISM: ICD-10-CM

## 2023-08-04 PROCEDURE — 99213 OFFICE O/P EST LOW 20 MIN: CPT | Performed by: INTERNAL MEDICINE

## 2023-08-04 RX ORDER — HYDROCODONE BITARTRATE AND ACETAMINOPHEN 5; 325 MG/1; MG/1
1 TABLET ORAL 2 TIMES DAILY PRN
Qty: 60 TABLET | Refills: 0 | Status: SHIPPED | OUTPATIENT
Start: 2023-08-04

## 2023-08-04 NOTE — PROGRESS NOTES
Office Visit Note  23     Larry Ashton 68 y.o. female MRN: [id number]  : 1950    Assessment:     1. Abnormal finding on urinalysis  Assessment & Plan:  Urinalysis shows trace blood which she always had it however there was some Enterococcus faecalis. Patient feels it is more likely contamination we will repeat the labs. Patient has no symptoms of urinary tract infection. 2. Acquired hypothyroidism  Assessment & Plan:  Patient is currently taking 100 mcg of levothyroxine TSH is slightly low at 0.37 but patient is feeling much better at this dosage we will continue with the same and follow-up. 3. Allergy, subsequent encounter  Assessment & Plan:  Continue Flonase and Singulair      4. Chronic pain syndrome  Assessment & Plan:  Pain in the right shoulder blade area is doing fine at present time history of low back pain on pain medications hydrocodone we will continue with the same for now. 5. Continuous opioid dependence ([address])    6. Fibromyalgia  Assessment & Plan:  Patient is on Cymbalta 30 mg daily continue      7. Gastroesophageal reflux disease without esophagitis  Assessment & Plan:  Patient on Protonix 40 mg continue      8. Migraine without status migrainosus, not intractable, unspecified migraine type    9. Obesity, morbid ([address])  Assessment & Plan:  BMI is about the same as before at 36.78 again emphasized regarding diet exercise lifestyle modification      10. Seizure disorder [facility])  Assessment & Plan:  Patient on Keppra 500 mg twice a day we will continue               Discussion Summary and Plan: Today's care plan and medications were reviewed with patient in detail and all their questions answered to their satisfaction.     Chief Complaint   Patient presents with   • Follow-up      Subjective:  Patient is coming for evaluation regarding her low back pain chronic pain syndrome recently had lab work done which has shown a TSH of 0.37 LDL of 105 urine showed some Enterococcus bacteria but patient has no symptoms. Medications reviewed labs reviewed. The following portions of the patient's history were reviewed and updated as appropriate: allergies, current medications, past family history, past medical history, past social history, past surgical history and problem list.    Review of Systems   Constitutional: Negative for chills and fever. HENT: Negative for ear pain and sore throat. Eyes: Negative for pain and visual disturbance. Respiratory: Negative for cough and shortness of breath. Cardiovascular: Negative for chest pain and palpitations. Gastrointestinal: Negative for abdominal pain and vomiting. Genitourinary: Negative for dysuria and hematuria. Musculoskeletal: Positive for arthralgias and back pain. Skin: Negative for color change and rash. Neurological: Negative for seizures and syncope. All other systems reviewed and are negative.         Historical Information   Patient Active Problem List   Diagnosis   • Chronic pain syndrome   • Acquired hypothyroidism   • Seizure disorder ([address])   • Gastroesophageal reflux disease without esophagitis   • Allergies   • Fibromyalgia   • Migraines   • Continuous opioid dependence ([address])   • Obesity, morbid ([address])   • Abnormal finding on urinalysis     Past Medical History:   Diagnosis Date   • Anxiety with depression    • Arm fracture, left     x2   • Arthritis    • Degenerative joint disease    • Encounter for immunization 10/18/2022   • Fibromyalgia    • GERD (gastroesophageal reflux disease)    • Hypertension    • Hypothyroidism    • Kidney stones    • Low back pain    • Seasonal allergies    • Seizure disorder ([address])      Past Surgical History:   Procedure Laterality Date   • COLONOSCOPY     • DILATION AND CURETTAGE OF UTERUS     • NECK SURGERY      Plate   • ORIF FOREARM FRACTURE Left    • SHOULDER SURGERY      x2   • TONSILLECTOMY       Social History     Substance and Sexual Activity   Alcohol Use Yes    Comment: Socially/very rare     Social History     Substance and Sexual Activity   Drug Use Never     Social History     Tobacco Use   Smoking Status Former   • Types: Cigarettes   • Quit date:    • Years since quittin.6   • Passive exposure: Past   Smokeless Tobacco Never     Family History   Problem Relation Age of Onset   • Stroke Mother    • Heart disease Father      Health Maintenance Due   Topic   • Hepatitis C Screening    • Breast Cancer Screening: Mammogram    • Osteoporosis Screening    • Pneumococcal Vaccine: 65+ Years (1 - PCV)   • COVID-19 Vaccine (3 - Moderna series)   • Colorectal Cancer Screening    • Influenza Vaccine (1)   • BMI: Followup Plan       Meds/Allergies       Current Outpatient Medications:   •  albuterol (PROVENTIL HFA,VENTOLIN HFA) 90 mcg/act inhaler, Inhale 2 puffs every 6 (six) hours as needed for wheezing, Disp: 8 g, Rfl: 1  •  Cholecalciferol (Vitamin D3) 50 MCG (2000 UT) TABS, Take 2,000 Units by mouth in the morning, Disp: , Rfl:   •  DULoxetine (CYMBALTA) 30 mg delayed release capsule, Take 1 capsule (30 mg total) by mouth in the morning, Disp: 90 capsule, Rfl: 1  •  fluticasone (FLONASE) 50 mcg/act nasal spray, USE 2 SPRAYS IN EACH NOSTRIL EVERY DAY, Disp: 32 g, Rfl: 3  •  HYDROcodone-acetaminophen (NORCO) 5-325 mg per tablet, Take 1 tablet by mouth 2 (two) times a day as needed for pain Max Daily Amount: 2 tablets, Disp: 60 tablet, Rfl: 0  •  levETIRAcetam (KEPPRA) 500 mg tablet, Take 1 tablet (500 mg total) by mouth 2 (two) times a day, Disp: 180 tablet, Rfl: 1  •  levothyroxine 100 mcg tablet, Take 1 tablet (100 mcg total) by mouth in the morning Stop taking the 112 mcg of levothyroxine, Disp: 90 tablet, Rfl: 1  •  Magnesium 400 MG TABS, Take 1 tablet (400 mg total) by mouth in the morning, Disp: 90 tablet, Rfl: 0  •  montelukast (SINGULAIR) 10 mg tablet, Take 1 tablet (10 mg total) by mouth in the morning, Disp: 90 tablet, Rfl: 1  •  pantoprazole (PROTONIX) 40 mg tablet, Take 1 tablet (40 mg total) by mouth daily, Disp: 90 tablet, Rfl: 1  •  vitamin B-12 (VITAMIN B-12) 1,000 mcg tablet, Take 1 tablet (1,000 mcg total) by mouth daily, Disp: 90 tablet, Rfl: 1      Objective:    Vitals:   /78 (BP Location: Right arm, Patient Position: Sitting, Cuff Size: Large)   Pulse 71   Ht 5' 5" (1.651 m)   Wt 100 kg (221 lb)   SpO2 95%   BMI 36.78 kg/m²   Body mass index is 36.78 kg/m². Vitals:    08/04/23 1058   Weight: 100 kg (221 lb)       Physical Exam  Vitals and nursing note reviewed. Constitutional:       Appearance: Normal appearance. Cardiovascular:      Rate and Rhythm: Normal rate and regular rhythm. Heart sounds: Normal heart sounds. Pulmonary:      Effort: Pulmonary effort is normal.      Breath sounds: Normal breath sounds. Abdominal:      Palpations: Abdomen is soft. Musculoskeletal:      Cervical back: Normal range of motion and neck supple. Right lower leg: No edema. Left lower leg: No edema. Neurological:      Mental Status: She is alert and oriented to person, place, and time. Lab Review   Orders Only on 07/26/2023   Component Date Value Ref Range Status   • Total Cholesterol 07/26/2023 184  <200 mg/dL Final   • HDL 07/26/2023 62  > OR = 50 mg/dL Final   • Triglycerides 07/26/2023 83  <150 mg/dL Final   • LDL Calculated 07/26/2023 105 (H)  mg/dL (calc) Final    Comment: Reference range: <100     Desirable range <100 mg/dL for primary prevention;    <70 mg/dL for patients with CHD or diabetic patients   with > or = 2 CHD risk factors. LDL-C is now calculated using the Rayray-Lehman   calculation, which is a validated novel method providing   better accuracy than the Friedewald equation in the   estimation of LDL-C. Candis Butler al. Sharda Late. 9579;968(23): 0670-6918   (http://education. Demandbase. com/faq/OMB811)     • Chol HDLC Ratio 07/26/2023 3.0  <5.0 (calc) Final   • Non-HDL Cholesterol 07/26/2023 122  <130 mg/dL (calc) Final    Comment: For patients with diabetes plus 1 major ASCVD risk   factor, treating to a non-HDL-C goal of <100 mg/dL   (LDL-C of <70 mg/dL) is considered a therapeutic   option. • Glucose, Random 07/26/2023 96  65 - 99 mg/dL Final    Comment:               Fasting reference interval        • BUN 07/26/2023 15  7 - 25 mg/dL Final   • Creatinine 07/26/2023 0.97  0.60 - 1.00 mg/dL Final   • eGFR 07/26/2023 62  > OR = 60 mL/min/1.73m2 Final    Comment: The eGFR is based on the CKD-EPI 2021 equation. To calculate   the new eGFR from a previous Creatinine or Cystatin C  result, go to [employer]. org/professionals/  kdoqi/gfr%5Fcalculator     • SL AMB BUN/CREATININE RATIO 03/51/3502 NOT APPLICABLE  6 - 22 (calc) Final   • Sodium 07/26/2023 142  135 - 146 mmol/L Final   • Potassium 07/26/2023 4.3  3.5 - 5.3 mmol/L Final   • Chloride 07/26/2023 103  98 - 110 mmol/L Final   • CO2 07/26/2023 33 (H)  20 - 32 mmol/L Final   • Calcium 07/26/2023 9.5  8.6 - 10.4 mg/dL Final   • Protein, Total 07/26/2023 6.7  6.1 - 8.1 g/dL Final   • Albumin 07/26/2023 3.9  3.6 - 5.1 g/dL Final   • Globulin 07/26/2023 2.8  1.9 - 3.7 g/dL (calc) Final   • Albumin/Globulin Ratio 07/26/2023 1.4  1.0 - 2.5 (calc) Final   • TOTAL BILIRUBIN 07/26/2023 0.4  0.2 - 1.2 mg/dL Final   • Alkaline Phosphatase 07/26/2023 65  37 - 153 U/L Final   • AST 07/26/2023 17  10 - 35 U/L Final   • ALT 07/26/2023 18  6 - 29 U/L Final   • Color UA 07/26/2023 YELLOW  YELLOW Final   • Urine Appearance 07/26/2023 CLEAR  CLEAR Final   • Specific Gravity 07/26/2023 1.008  1.001 - 1.035 Final   • Ph 07/26/2023 6.0  5.0 - 8.0 Final   • Glucose, Urine 07/26/2023 NEGATIVE  NEGATIVE Final   • Bilirubin, Urine 07/26/2023 NEGATIVE  NEGATIVE Final   • Ketone, Urine 07/26/2023 NEGATIVE  NEGATIVE Final   • Blood, Urine 07/26/2023 TRACE (A)  NEGATIVE Final   • Protein, Urine 07/26/2023 NEGATIVE  NEGATIVE Final   • SL AMB NITRITES URINE, QUAL. 07/26/2023 NEGATIVE  NEGATIVE Final   • Leukocyte Esterase 07/26/2023 2+ (A)  NEGATIVE Final   • SL AMB WBC, URINE 07/26/2023 0-5  < OR = 5 /HPF Final   • RBC, Urine 07/26/2023 NONE SEEN  < OR = 2 /HPF Final   • Squamous Epithelial Cells 07/26/2023 6-10 (A)  < OR = 5 /HPF Final   • Bacteria, UA 07/26/2023 NONE SEEN  NONE SEEN /HPF Final   • Hyaline Casts 07/26/2023 NONE SEEN  NONE SEEN /LPF Final   • Comment(s) 07/26/2023    Final    Comment: This urine was analyzed for the presence of WBC,   RBC, bacteria, casts, and other formed elements. Only those elements seen were reported. • White Blood Cell Count 07/26/2023 5.9  3.8 - 10.8 Thousand/uL Final   • Red Blood Cell Count 07/26/2023 4.52  3.80 - 5.10 Million/uL Final   • Hemoglobin 07/26/2023 13.7  11.7 - 15.5 g/dL Final   • HCT 07/26/2023 40.8  35.0 - 45.0 % Final   • MCV 07/26/2023 90.3  80.0 - 100.0 fL Final   • MCH 07/26/2023 30.3  27.0 - 33.0 pg Final   • MCHC 07/26/2023 33.6  32.0 - 36.0 g/dL Final   • RDW 07/26/2023 13.0  11.0 - 15.0 % Final   • Platelet Count 16/77/5053 211  140 - 400 Thousand/uL Final   • SL AMB MPV 07/26/2023 12.6 (H)  7.5 - 12.5 fL Final   • Neutrophils (Absolute) 07/26/2023 3,245  1,500 - 7,800 cells/uL Final   • Lymphocytes (Absolute) 07/26/2023 1,788  850 - 3,900 cells/uL Final   • Monocytes (Absolute) 07/26/2023 696  200 - 950 cells/uL Final   • Eosinophils (Absolute) 07/26/2023 153  15 - 500 cells/uL Final   • Basophils ABS 07/26/2023 18  0 - 200 cells/uL Final   • Neutrophils 07/26/2023 55  % Final   • Lymphocytes 07/26/2023 30.3  % Final   • Monocytes 07/26/2023 11.8  % Final   • Eosinophils 07/26/2023 2.6  % Final   • Basophils PCT 07/26/2023 0.3  % Final   • TSH W/RFX TO FREE T4 07/26/2023 0.37 (L)  0.40 - 4.50 mIU/L Final   • Hemoglobin A1C 07/26/2023 5.4  <5.7 % of total Hgb Final    Comment:  For the purpose of screening for the presence of  diabetes:     <5.7%       Consistent with the absence of diabetes  5.7-6.4%    Consistent with increased risk for diabetes              (prediabetes)  > or =6.5%  Consistent with diabetes     This assay result is consistent with a decreased risk  of diabetes. Currently, no consensus exists regarding use of  hemoglobin A1c for diagnosis of diabetes in children. According to American Diabetes Association (ADA)  guidelines, hemoglobin A1c <7.0% represents optimal  control in non-pregnant diabetic patients. Different  metrics may apply to specific patient populations. Standards of Medical Care in Diabetes(ADA). • Urine Culture, Comprehensive 07/26/2023    Final    CULTURE INDICATED - RESULTS TO FOLLOW   • Urine Culture Result 07/26/2023  (A)   Final    Comment:     CULTURE, URINE, ROUTINE         Micro Number:      [id number]    Test Status:       Final    Specimen Source:   Urine    Specimen Quality:  Adequate    Result:            10,000-49,000 CFU/mL of Enterococcus faecalis                              E.faecalis                            ----------------                            INT   SULMA     AMPICILLIN             S     <=2     NITROFURANTOIN         S     <=16     VANCOMYCIN             S     2    S=Susceptible  I=Intermediate  R=Resistant  * = Not Tested  NR = Not Reported  **NN = See Therapy Comments       • Free t4 07/26/2023 1.5  0.8 - 1.8 ng/dL Final         Abi Forrester MD        "This note has been constructed using a voice recognition system. Therefore there may be syntax, spelling, and/or grammatical errors.  Please call if you have any questions. "

## 2023-08-04 NOTE — ASSESSMENT & PLAN NOTE
Urinalysis shows trace blood which she always had it however there was some Enterococcus faecalis. Patient feels it is more likely contamination we will repeat the labs. Patient has no symptoms of urinary tract infection.

## 2023-08-04 NOTE — ASSESSMENT & PLAN NOTE
Pain in the right shoulder blade area is doing fine at present time history of low back pain on pain medications hydrocodone we will continue with the same for now.

## 2023-08-04 NOTE — ASSESSMENT & PLAN NOTE
Patient is currently taking 100 mcg of levothyroxine TSH is slightly low at 0.37 but patient is feeling much better at this dosage we will continue with the same and follow-up.

## 2023-08-04 NOTE — ASSESSMENT & PLAN NOTE
BMI is about the same as before at 36.78 again emphasized regarding diet exercise lifestyle modification

## 2023-09-03 DIAGNOSIS — G89.4 CHRONIC PAIN SYNDROME: ICD-10-CM

## 2023-09-05 RX ORDER — HYDROCODONE BITARTRATE AND ACETAMINOPHEN 5; 325 MG/1; MG/1
TABLET ORAL
Qty: 40 TABLET | Refills: 0 | Status: SHIPPED | OUTPATIENT
Start: 2023-09-05 | End: 2023-09-20 | Stop reason: SDUPTHER

## 2023-09-07 ENCOUNTER — DOCUMENTATION (OUTPATIENT)
Dept: FAMILY MEDICINE CLINIC | Facility: CLINIC | Age: 73
End: 2023-09-07

## 2023-09-07 NOTE — PROGRESS NOTES
Nataliia Rochelle agreement for controlled substances. Patient took it home to fill out. Will bring on the 20th her office visit.

## 2023-09-13 ENCOUNTER — RA CDI HCC (OUTPATIENT)
Dept: OTHER | Facility: HOSPITAL | Age: 73
End: 2023-09-13

## 2023-09-20 ENCOUNTER — OFFICE VISIT (OUTPATIENT)
Dept: FAMILY MEDICINE CLINIC | Facility: CLINIC | Age: 73
End: 2023-09-20
Payer: MEDICARE

## 2023-09-20 VITALS
HEIGHT: 65 IN | HEART RATE: 71 BPM | SYSTOLIC BLOOD PRESSURE: 122 MMHG | BODY MASS INDEX: 36.49 KG/M2 | DIASTOLIC BLOOD PRESSURE: 76 MMHG | OXYGEN SATURATION: 94 % | WEIGHT: 219 LBS

## 2023-09-20 DIAGNOSIS — M79.7 FIBROMYALGIA: ICD-10-CM

## 2023-09-20 DIAGNOSIS — E03.9 ACQUIRED HYPOTHYROIDISM: ICD-10-CM

## 2023-09-20 DIAGNOSIS — Z12.11 ENCOUNTER FOR SCREENING COLONOSCOPY: ICD-10-CM

## 2023-09-20 DIAGNOSIS — E66.01 OBESITY, MORBID (HCC): ICD-10-CM

## 2023-09-20 DIAGNOSIS — G43.909 MIGRAINE WITHOUT STATUS MIGRAINOSUS, NOT INTRACTABLE, UNSPECIFIED MIGRAINE TYPE: ICD-10-CM

## 2023-09-20 DIAGNOSIS — F11.20 CONTINUOUS OPIOID DEPENDENCE (HCC): ICD-10-CM

## 2023-09-20 DIAGNOSIS — K21.9 GASTROESOPHAGEAL REFLUX DISEASE WITHOUT ESOPHAGITIS: Primary | ICD-10-CM

## 2023-09-20 DIAGNOSIS — G89.4 CHRONIC PAIN SYNDROME: ICD-10-CM

## 2023-09-20 DIAGNOSIS — T78.40XD ALLERGY, SUBSEQUENT ENCOUNTER: ICD-10-CM

## 2023-09-20 DIAGNOSIS — G40.909 SEIZURE DISORDER (HCC): ICD-10-CM

## 2023-09-20 DIAGNOSIS — Z23 NEED FOR VACCINATION: ICD-10-CM

## 2023-09-20 DIAGNOSIS — R82.90 ABNORMAL FINDING ON URINALYSIS: ICD-10-CM

## 2023-09-20 PROCEDURE — 90662 IIV NO PRSV INCREASED AG IM: CPT | Performed by: INTERNAL MEDICINE

## 2023-09-20 PROCEDURE — 99214 OFFICE O/P EST MOD 30 MIN: CPT | Performed by: INTERNAL MEDICINE

## 2023-09-20 PROCEDURE — G0008 ADMIN INFLUENZA VIRUS VAC: HCPCS | Performed by: INTERNAL MEDICINE

## 2023-09-20 RX ORDER — HYDROCODONE BITARTRATE AND ACETAMINOPHEN 5; 325 MG/1; MG/1
1 TABLET ORAL 2 TIMES DAILY
Qty: 40 TABLET | Refills: 0 | Status: SHIPPED | OUTPATIENT
Start: 2023-09-20

## 2023-09-20 NOTE — ASSESSMENT & PLAN NOTE
Patient on Protonix. Patient with [facility] we will arrange for upper endoscopy and get the colonoscopy also done at the same time.

## 2023-09-20 NOTE — ASSESSMENT & PLAN NOTE
Patient with chronic low back pain on hydrocodone we will continue the same discussed with patient regarding dependence

## 2023-09-20 NOTE — ASSESSMENT & PLAN NOTE
Currently patient on 100 mcg of levothyroxine we will continue the same follow-up with repeat TSH level at a later date.

## 2023-09-20 NOTE — ASSESSMENT & PLAN NOTE
BMI is slightly low at 36.44 compared to the last time again emphasized regarding lifestyle modification diet exercise

## 2023-09-20 NOTE — PROGRESS NOTES
Name: Ricardo Sanchez      : 1950      MRN: [id number]  Encounter Provider: Jennie Oviedo MD  Encounter Date: 2023   Encounter department: [address]     1. Gastroesophageal reflux disease without esophagitis  Assessment & Plan:  Patient on Protonix. Patient with [facility] we will arrange for upper endoscopy and get the colonoscopy also done at the same time. Orders:  -     Ambulatory referral to Gastroenterology; Future    2. Need for vaccination  -     influenza vaccine, high-dose, PF 0.7 mL (FLUZONE HIGH-DOSE)    3. Chronic pain syndrome  Assessment & Plan:  Patient with chronic low back pain on hydrocodone we will continue the same discussed with patient regarding dependence    Orders:  -     HYDROcodone-acetaminophen (NORCO) 5-325 mg per tablet; Take 1 tablet by mouth 2 (two) times a day Max Daily Amount: 2 tablets    4. Abnormal finding on urinalysis  Assessment & Plan:  Last urinalysis showed trace blood and some Enterococcus faecalis most likely contaminant no symptoms at present time repeat urine analysis culture is pending. 5. Acquired hypothyroidism  Assessment & Plan:  Currently patient on 100 mcg of levothyroxine we will continue the same follow-up with repeat TSH level at a later date. 6. Allergy, subsequent encounter  Assessment & Plan:  Continue with Singulair and also Flonase nasal spray      7. Continuous opioid dependence ([address])    8. Fibromyalgia  Assessment & Plan:  Continue Cymbalta 30 mg daily      9. Migraine without status migrainosus, not intractable, unspecified migraine type  Assessment & Plan:  Patient still gets on and off migraine headaches not severe      10. Obesity, morbid ([address])  Assessment & Plan:  BMI is slightly low at 36.44 compared to the last time again emphasized regarding lifestyle modification diet exercise      11. Seizure disorder ([address])  Assessment & Plan:  Continue Keppra 500 mg twice a day      12. Encounter for screening colonoscopy  -     Ambulatory referral to Gastroenterology; Future    BMI Counseling: Body mass index is 36.78 kg/m². The BMI is above normal. Nutrition recommendations include decreasing portion sizes, encouraging healthy choices of fruits and vegetables, consuming healthier snacks, limiting drinks that contain sugar, moderation in carbohydrate intake and reducing intake of cholesterol. Exercise recommendations include strength training exercises. No pharmacotherapy was ordered. Rationale for BMI follow-up plan is due to patient being overweight or obese. Subjective     Patient is coming here for a follow-up evaluation with regards to her chronic pain syndrome, hypothyroidism, DJD. Denies any symptoms of chest pain palpitation shortness of breath continues to experience low back pain. Medications reviewed labs reviewed last urine analysis was abnormal and repeat 1 is pending. Review of Systems   Constitutional: Negative for chills, fatigue and fever. HENT: Negative for ear pain, sore throat and trouble swallowing. Eyes: Negative for pain and visual disturbance. Respiratory: Negative for cough and shortness of breath. Cardiovascular: Negative for chest pain and palpitations. Gastrointestinal: Negative for abdominal pain, constipation, diarrhea and vomiting. Genitourinary: Negative for difficulty urinating, dysuria, frequency and hematuria. Musculoskeletal: Positive for back pain. Negative for arthralgias and myalgias. Skin: Negative for color change and rash. Neurological: Negative for dizziness, seizures, syncope, light-headedness and headaches. Psychiatric/Behavioral: Negative for agitation, confusion and sleep disturbance. All other systems reviewed and are negative.       Past Medical History:   Diagnosis Date   • Anxiety with depression    • Arm fracture, left     x2   • Arthritis    • Degenerative joint disease    • Encounter for immunization 10/18/2022   • Fibromyalgia    • GERD (gastroesophageal reflux disease)    • Hypertension    • Hypothyroidism    • Kidney stones    • Low back pain    • Seasonal allergies    • Seizure disorder [facility])      Past Surgical History:   Procedure Laterality Date   • COLONOSCOPY     • DILATION AND CURETTAGE OF UTERUS     • NECK SURGERY      Plate   • ORIF FOREARM FRACTURE Left    • SHOULDER SURGERY      x2   • TONSILLECTOMY       Family History   Problem Relation Age of Onset   • Stroke Mother    • Heart disease Father      Social History     Socioeconomic History   • Marital status:      Spouse name: None   • Number of children: None   • Years of education: None   • Highest education level: None   Occupational History   • None   Tobacco Use   • Smoking status: Former     Types: Cigarettes     Quit date:      Years since quittin.7     Passive exposure: Past   • Smokeless tobacco: Never   Substance and Sexual Activity   • Alcohol use: Yes     Comment: Socially/very rare   • Drug use: Never   • Sexual activity: None   Other Topics Concern   • None   Social History Narrative   • None     Social Determinants of Health     Financial Resource Strain: Low Risk  (2023)    Overall Financial Resource Strain (CARDIA)    • Difficulty of Paying Living Expenses: Not hard at all   Recent Concern: Financial Resource Strain - Medium Risk (2023)    Overall Financial Resource Strain (CARDIA)    • Difficulty of Paying Living Expenses: Somewhat hard   Food Insecurity: Not on file   Transportation Needs: No Transportation Needs (2023)    PRAPARE - Transportation    • Lack of Transportation (Medical): No    • Lack of Transportation (Non-Medical):  No   Physical Activity: Not on file   Stress: Not on file   Social Connections: Not on file   Intimate Partner Violence: Not on file   Housing Stability: Not on file     Current Outpatient Medications on File Prior to Visit   Medication Sig   • albuterol (PROVENTIL HFA,VENTOLIN HFA) 90 mcg/act inhaler Inhale 2 puffs every 6 (six) hours as needed for wheezing   • Cholecalciferol (Vitamin D3) 50 MCG (2000 UT) TABS Take 2,000 Units by mouth in the morning   • DULoxetine (CYMBALTA) 30 mg delayed release capsule Take 1 capsule (30 mg total) by mouth in the morning   • fluticasone (FLONASE) 50 mcg/act nasal spray USE 2 SPRAYS IN EACH NOSTRIL EVERY DAY   • levETIRAcetam (KEPPRA) 500 mg tablet Take 1 tablet (500 mg total) by mouth 2 (two) times a day   • levothyroxine 100 mcg tablet Take 1 tablet (100 mcg total) by mouth in the morning Stop taking the 112 mcg of levothyroxine   • Magnesium 400 MG TABS Take 1 tablet (400 mg total) by mouth in the morning   • montelukast (SINGULAIR) 10 mg tablet Take 1 tablet (10 mg total) by mouth in the morning   • pantoprazole (PROTONIX) 40 mg tablet Take 1 tablet (40 mg total) by mouth daily   • vitamin B-12 (VITAMIN B-12) 1,000 mcg tablet Take 1 tablet (1,000 mcg total) by mouth daily   • [DISCONTINUED] HYDROcodone-acetaminophen (NORCO) 5-325 mg per tablet take 1 tablet by mouth twice a day if needed  FOR PAIN MAX 2 TABS A DAY     Allergies   Allergen Reactions   • Beta Adrenergic Blockers Shortness Of Breath   • Sulfamethoxazole-Trimethoprim Shortness Of Breath   • Meloxicam Confusion     Immunization History   Administered Date(s) Administered   • COVID-19 MODERNA VACC 0.5 ML IM 04/16/2021, 05/18/2021   • INFLUENZA 10/18/2022   • Influenza, high dose seasonal 0.7 mL 10/18/2022, 09/20/2023       Objective     /76   Pulse 71   Ht 5' 5" (1.651 m)   Wt 99.3 kg (219 lb)   SpO2 94%   BMI 36.44 kg/m²     Physical Exam  Vitals and nursing note reviewed. Constitutional:       Appearance: Normal appearance. She is not ill-appearing. HENT:      Head: Normocephalic. Right Ear: External ear normal.      Left Ear: External ear normal.   Eyes:      General:         Right eye: No discharge. Left eye: No discharge. Conjunctiva/sclera: Conjunctivae normal.   Cardiovascular:      Rate and Rhythm: Normal rate and regular rhythm. Heart sounds: Normal heart sounds. Pulmonary:      Effort: Pulmonary effort is normal. No respiratory distress. Breath sounds: Normal breath sounds. Abdominal:      General: Bowel sounds are normal.      Palpations: There is no mass. Hernia: No hernia is present. Musculoskeletal:      Cervical back: Normal range of motion and neck supple. Right lower leg: No edema. Left lower leg: No edema. Skin:     Coloration: Skin is not jaundiced or pale. Neurological:      General: No focal deficit present. Mental Status: She is alert and oriented to person, place, and time. Sensory: No sensory deficit. Motor: No weakness. Coordination: Coordination normal.      Gait: Gait normal.   Psychiatric:         Mood and Affect: Mood normal.         Behavior: Behavior normal.         Thought Content:  Thought content normal.         Judgment: Judgment normal.       Jesusita Funez MD

## 2023-09-20 NOTE — ASSESSMENT & PLAN NOTE
Last urinalysis showed trace blood and some Enterococcus faecalis most likely contaminant no symptoms at present time repeat urine analysis culture is pending.

## 2023-10-02 DIAGNOSIS — F41.9 ANXIETY: ICD-10-CM

## 2023-10-02 DIAGNOSIS — K21.9 GASTROESOPHAGEAL REFLUX DISEASE WITHOUT ESOPHAGITIS: ICD-10-CM

## 2023-10-02 DIAGNOSIS — T78.40XD ALLERGY, SUBSEQUENT ENCOUNTER: ICD-10-CM

## 2023-10-02 DIAGNOSIS — G40.909 SEIZURE DISORDER (HCC): ICD-10-CM

## 2023-10-02 DIAGNOSIS — E03.9 ACQUIRED HYPOTHYROIDISM: ICD-10-CM

## 2023-10-02 RX ORDER — MONTELUKAST SODIUM 10 MG/1
10 TABLET ORAL EVERY MORNING
Qty: 90 TABLET | Refills: 1 | Status: SHIPPED | OUTPATIENT
Start: 2023-10-02

## 2023-10-02 RX ORDER — DULOXETIN HYDROCHLORIDE 30 MG/1
30 CAPSULE, DELAYED RELEASE ORAL EVERY MORNING
Qty: 90 CAPSULE | Refills: 1 | Status: SHIPPED | OUTPATIENT
Start: 2023-10-02

## 2023-10-02 RX ORDER — LEVOTHYROXINE SODIUM 0.1 MG/1
100 TABLET ORAL EVERY MORNING
Qty: 90 TABLET | Refills: 1 | Status: SHIPPED | OUTPATIENT
Start: 2023-10-02

## 2023-10-02 RX ORDER — LEVETIRACETAM 500 MG/1
500 TABLET ORAL 2 TIMES DAILY
Qty: 180 TABLET | Refills: 1 | Status: SHIPPED | OUTPATIENT
Start: 2023-10-02

## 2023-10-02 RX ORDER — PANTOPRAZOLE SODIUM 40 MG/1
40 TABLET, DELAYED RELEASE ORAL DAILY
Qty: 90 TABLET | Refills: 1 | Status: SHIPPED | OUTPATIENT
Start: 2023-10-02

## 2023-10-10 ENCOUNTER — RA CDI HCC (OUTPATIENT)
Dept: OTHER | Facility: HOSPITAL | Age: 73
End: 2023-10-10

## 2023-10-13 ENCOUNTER — TELEPHONE (OUTPATIENT)
Age: 73
End: 2023-10-13

## 2023-10-13 NOTE — TELEPHONE ENCOUNTER
After questioning patient  several times about specific details patient stated she wants to speak to PCP in regards to an operation she will be getting. Please have PCP contact patient and advise. Thank you for your help.

## 2023-10-17 ENCOUNTER — OFFICE VISIT (OUTPATIENT)
Dept: FAMILY MEDICINE CLINIC | Facility: CLINIC | Age: 73
End: 2023-10-17
Payer: MEDICARE

## 2023-10-17 VITALS
HEIGHT: 65 IN | BODY MASS INDEX: 36.49 KG/M2 | SYSTOLIC BLOOD PRESSURE: 116 MMHG | OXYGEN SATURATION: 96 % | HEART RATE: 72 BPM | DIASTOLIC BLOOD PRESSURE: 70 MMHG | WEIGHT: 219 LBS

## 2023-10-17 DIAGNOSIS — T78.40XD ALLERGY, SUBSEQUENT ENCOUNTER: ICD-10-CM

## 2023-10-17 DIAGNOSIS — F11.20 CONTINUOUS OPIOID DEPENDENCE (HCC): ICD-10-CM

## 2023-10-17 DIAGNOSIS — G40.909 SEIZURE DISORDER (HCC): ICD-10-CM

## 2023-10-17 DIAGNOSIS — E66.01 OBESITY, MORBID (HCC): ICD-10-CM

## 2023-10-17 DIAGNOSIS — R82.90 ABNORMAL FINDING ON URINALYSIS: ICD-10-CM

## 2023-10-17 DIAGNOSIS — G43.909 MIGRAINE WITHOUT STATUS MIGRAINOSUS, NOT INTRACTABLE, UNSPECIFIED MIGRAINE TYPE: ICD-10-CM

## 2023-10-17 DIAGNOSIS — G89.4 CHRONIC PAIN SYNDROME: Primary | ICD-10-CM

## 2023-10-17 DIAGNOSIS — M79.7 FIBROMYALGIA: ICD-10-CM

## 2023-10-17 DIAGNOSIS — E03.9 ACQUIRED HYPOTHYROIDISM: ICD-10-CM

## 2023-10-17 DIAGNOSIS — K21.9 GASTROESOPHAGEAL REFLUX DISEASE WITHOUT ESOPHAGITIS: ICD-10-CM

## 2023-10-17 PROCEDURE — 99213 OFFICE O/P EST LOW 20 MIN: CPT | Performed by: INTERNAL MEDICINE

## 2023-10-17 RX ORDER — HYDROCODONE BITARTRATE AND ACETAMINOPHEN 5; 325 MG/1; MG/1
1 TABLET ORAL 2 TIMES DAILY
Qty: 40 TABLET | Refills: 0 | Status: SHIPPED | OUTPATIENT
Start: 2023-10-17

## 2023-10-17 NOTE — PROGRESS NOTES
Office Visit Note  10/17/23     Kalin Grant 68 y.o. female MRN: [id number]  : 1950    Assessment:     1. Chronic pain syndrome  Assessment & Plan:  Patient with chronic low back pain currently taking Norco 1 tablet twice a day as needed we will renew the same      2. Acquired hypothyroidism    3. Abnormal finding on urinalysis    4. Allergy, subsequent encounter    5. Continuous opioid dependence ([address])    6. Fibromyalgia  Assessment & Plan:  Patient with fibromyalgia continue Cymbalta 30 mg daily      7. Gastroesophageal reflux disease without esophagitis    8. Migraine without status migrainosus, not intractable, unspecified migraine type    9. Obesity, morbid ([address])    10. Seizure disorder [facility])               Discussion Summary and Plan: Today's care plan and medications were reviewed with patient in detail and all their questions answered to their satisfaction. Chief Complaint   Patient presents with    Follow-up      Subjective:  Patient is coming here for a follow-up evaluation with regards to symptoms of chronic pain syndrome low back pain. Medications reviewed labs reviewed. Currently she is taking hydrocodone 5/325 as needed. The following portions of the patient's history were reviewed and updated as appropriate: allergies, current medications, past family history, past medical history, past social history, past surgical history and problem list.    Review of Systems   Constitutional:  Negative for chills and fever. HENT:  Negative for ear pain and sore throat. Eyes:  Negative for pain and visual disturbance. Respiratory:  Negative for cough and shortness of breath. Cardiovascular:  Negative for chest pain and palpitations. Gastrointestinal:  Negative for abdominal pain and vomiting. Genitourinary:  Negative for dysuria and hematuria. Musculoskeletal:  Negative for arthralgias and back pain. Skin:  Negative for color change and rash.    Neurological:  Negative for seizures and syncope. All other systems reviewed and are negative.         Historical Information     Patient Active Problem List   Diagnosis    Chronic pain syndrome    Acquired hypothyroidism    Seizure disorder ([address])    Gastroesophageal reflux disease without esophagitis    Allergies    Fibromyalgia    Migraines    Continuous opioid dependence ([address])    Obesity, morbid ([facility])    Abnormal finding on urinalysis     Past Medical History:   Diagnosis Date    Anxiety with depression     Arm fracture, left     x2    Arthritis     Degenerative joint disease     Encounter for immunization 10/18/2022    Fibromyalgia     GERD (gastroesophageal reflux disease)     Hypertension     Hypothyroidism     Kidney stones     Low back pain     Seasonal allergies     Seizure disorder ([address])      Past Surgical History:   Procedure Laterality Date    COLONOSCOPY      DILATION AND CURETTAGE OF UTERUS      NECK SURGERY      Plate    ORIF FOREARM FRACTURE Left     SHOULDER SURGERY      x2    TONSILLECTOMY       Social History     Substance and Sexual Activity   Alcohol Use Yes    Comment: Socially/very rare     Social History     Substance and Sexual Activity   Drug Use Never     Social History     Tobacco Use   Smoking Status Former    Types: Cigarettes    Quit date:     Years since quittin.8    Passive exposure: Past   Smokeless Tobacco Never     Family History   Problem Relation Age of Onset    Stroke Mother     Heart disease Father      Health Maintenance Due   Topic    Hepatitis C Screening     Breast Cancer Screening: Mammogram     Osteoporosis Screening     Pneumococcal Vaccine: 65+ Years (1 - PCV)    COVID-19 Vaccine (3 - Moderna series)    Colorectal Cancer Screening       Meds/Allergies       Current Outpatient Medications:     albuterol (PROVENTIL HFA,VENTOLIN HFA) 90 mcg/act inhaler, Inhale 2 puffs every 6 (six) hours as needed for wheezing, Disp: 8 g, Rfl: 1    Cholecalciferol (Vitamin D3) 50 MCG (2000) TABS, Take 2,000 Units by mouth in the morning, Disp: , Rfl:     DULoxetine (CYMBALTA) 30 mg delayed release capsule, TAKE 1 CAPSULE EVERY MORNING, Disp: 90 capsule, Rfl: 1    fluticasone (FLONASE) 50 mcg/act nasal spray, USE 2 SPRAYS IN EACH NOSTRIL EVERY DAY, Disp: 32 g, Rfl: 3    HYDROcodone-acetaminophen (NORCO) 5-325 mg per tablet, Take 1 tablet by mouth 2 (two) times a day Max Daily Amount: 2 tablets, Disp: 40 tablet, Rfl: 0    levETIRAcetam (KEPPRA) 500 mg tablet, TAKE 1 TABLET TWICE DAILY, Disp: 180 tablet, Rfl: 1    levothyroxine 100 mcg tablet, TAKE 1 TABLET EVERY MORNING, Disp: 90 tablet, Rfl: 1    Magnesium 400 MG TABS, Take 1 tablet (400 mg total) by mouth in the morning, Disp: 90 tablet, Rfl: 0    montelukast (SINGULAIR) 10 mg tablet, TAKE 1 TABLET EVERY MORNING, Disp: 90 tablet, Rfl: 1    pantoprazole (PROTONIX) 40 mg tablet, TAKE 1 TABLET EVERY DAY, Disp: 90 tablet, Rfl: 1    vitamin B-12 (VITAMIN B-12) 1,000 mcg tablet, Take 1 tablet (1,000 mcg total) by mouth daily, Disp: 90 tablet, Rfl: 1      Objective:    Vitals:   /70 (BP Location: Right arm, Patient Position: Sitting, Cuff Size: Large)   Pulse 72   Ht 5' 5" (1.651 m)   Wt 99.3 kg (219 lb)   SpO2 96%   BMI 36.44 kg/m²   Body mass index is 36.44 kg/m². Vitals:    10/17/23 1252   Weight: 99.3 kg (219 lb)       Physical Exam  Vitals and nursing note reviewed. Constitutional:       Appearance: Normal appearance. Cardiovascular:      Rate and Rhythm: Normal rate and regular rhythm. Heart sounds: Normal heart sounds. Pulmonary:      Effort: Pulmonary effort is normal.      Breath sounds: Normal breath sounds. Abdominal:      Palpations: Abdomen is soft. Musculoskeletal:      Cervical back: Normal range of motion and neck supple. Right lower leg: No edema. Left lower leg: No edema. Skin:     General: Skin is warm and dry. Neurological:      Mental Status: She is alert and oriented to person, place, and time.          Lab Review   No visits with results within 2 Month(s) from this visit. Latest known visit with results is:   Orders Only on 07/26/2023   Component Date Value Ref Range Status    Total Cholesterol 07/26/2023 184  <200 mg/dL Final    HDL 07/26/2023 62  > OR = 50 mg/dL Final    Triglycerides 07/26/2023 83  <150 mg/dL Final    LDL Calculated 07/26/2023 105 (H)  mg/dL (calc) Final    Comment: Reference range: <100     Desirable range <100 mg/dL for primary prevention;    <70 mg/dL for patients with CHD or diabetic patients   with > or = 2 CHD risk factors. LDL-C is now calculated using the Rayray-Lehman   calculation, which is a validated novel method providing   better accuracy than the Friedewald equation in the   estimation of LDL-C. Danielle Hawkins et al. [facility]. 5837;327(20): 2001-6150   (http://education. [employer]/faq/RAG065)      Chol HDLC Ratio 07/26/2023 3.0  <5.0 (calc) Final    Non-HDL Cholesterol 07/26/2023 122  <130 mg/dL (calc) Final    Comment: For patients with diabetes plus 1 major ASCVD risk   factor, treating to a non-HDL-C goal of <100 mg/dL   (LDL-C of <70 mg/dL) is considered a therapeutic   option. Glucose, Random 07/26/2023 96  65 - 99 mg/dL Final    Comment:               Fasting reference interval         BUN 07/26/2023 15  7 - 25 mg/dL Final    Creatinine 07/26/2023 0.97  0.60 - 1.00 mg/dL Final    eGFR 07/26/2023 62  > OR = 60 mL/min/1.73m2 Final    Comment: The eGFR is based on the CKD-EPI 2021 equation. To calculate   the new eGFR from a previous Creatinine or Cystatin C  result, go to [employer]. org/professionals/  kdoqi/gfr%5Fcalculator      SL AMB BUN/CREATININE RATIO 32/31/0210 NOT APPLICABLE  6 - 22 (calc) Final    Sodium 07/26/2023 142  135 - 146 mmol/L Final    Potassium 07/26/2023 4.3  3.5 - 5.3 mmol/L Final    Chloride 07/26/2023 103  98 - 110 mmol/L Final    CO2 07/26/2023 33 (H)  20 - 32 mmol/L Final    Calcium 07/26/2023 9.5  8.6 - 10.4 mg/dL Final Protein, Total 07/26/2023 6.7  6.1 - 8.1 g/dL Final    Albumin 07/26/2023 3.9  3.6 - 5.1 g/dL Final    Globulin 07/26/2023 2.8  1.9 - 3.7 g/dL (calc) Final    Albumin/Globulin Ratio 07/26/2023 1.4  1.0 - 2.5 (calc) Final    TOTAL BILIRUBIN 07/26/2023 0.4  0.2 - 1.2 mg/dL Final    Alkaline Phosphatase 07/26/2023 65  37 - 153 U/L Final    AST 07/26/2023 17  10 - 35 U/L Final    ALT 07/26/2023 18  6 - 29 U/L Final    Color UA 07/26/2023 YELLOW  YELLOW Final    Urine Appearance 07/26/2023 CLEAR  CLEAR Final    Specific Gravity 07/26/2023 1.008  1.001 - 1.035 Final    Ph 07/26/2023 6.0  5.0 - 8.0 Final    Glucose, Urine 07/26/2023 NEGATIVE  NEGATIVE Final    Bilirubin, Urine 07/26/2023 NEGATIVE  NEGATIVE Final    Ketone, Urine 07/26/2023 NEGATIVE  NEGATIVE Final    Blood, Urine 07/26/2023 TRACE (A)  NEGATIVE Final    Protein, Urine 07/26/2023 NEGATIVE  NEGATIVE Final    Nitrites Urine 07/26/2023 NEGATIVE  NEGATIVE Final    Leukocyte Esterase 07/26/2023 2+ (A)  NEGATIVE Final    SL AMB WBC, URINE 07/26/2023 0-5  < OR = 5 /HPF Final    RBC, Urine 07/26/2023 NONE SEEN  < OR = 2 /HPF Final    Squamous Epithelial Cells 07/26/2023 6-10 (A)  < OR = 5 /HPF Final    Bacteria, UA 07/26/2023 NONE SEEN  NONE SEEN /HPF Final    Hyaline Casts 07/26/2023 NONE SEEN  NONE SEEN /LPF Final    Comment(s) 07/26/2023    Final    Comment: This urine was analyzed for the presence of WBC,   RBC, bacteria, casts, and other formed elements. Only those elements seen were reported.             White Blood Cell Count 07/26/2023 5.9  3.8 - 10.8 Thousand/uL Final    Red Blood Cell Count 07/26/2023 4.52  3.80 - 5.10 Million/uL Final    Hemoglobin 07/26/2023 13.7  11.7 - 15.5 g/dL Final    HCT 07/26/2023 40.8  35.0 - 45.0 % Final    MCV 07/26/2023 90.3  80.0 - 100.0 fL Final    MCH 07/26/2023 30.3  27.0 - 33.0 pg Final    MCHC 07/26/2023 33.6  32.0 - 36.0 g/dL Final    RDW 07/26/2023 13.0  11.0 - 15.0 % Final    Platelet Count 44/11/6775 211  140 - 400 Thousand/uL Final    SL AMB MPV 07/26/2023 12.6 (H)  7.5 - 12.5 fL Final    Neutrophils (Absolute) 07/26/2023 3,245  1,500 - 7,800 cells/uL Final    Lymphocytes (Absolute) 07/26/2023 1,788  850 - 3,900 cells/uL Final    Monocytes (Absolute) 07/26/2023 696  200 - 950 cells/uL Final    Eosinophils (Absolute) 07/26/2023 153  15 - 500 cells/uL Final    Basophils ABS 07/26/2023 18  0 - 200 cells/uL Final    Neutrophils 07/26/2023 55  % Final    Lymphocytes 07/26/2023 30.3  % Final    Monocytes 07/26/2023 11.8  % Final    Eosinophils 07/26/2023 2.6  % Final    Basophils PCT 07/26/2023 0.3  % Final    TSH W/RFX TO FREE T4 07/26/2023 0.37 (L)  0.40 - 4.50 mIU/L Final    Hemoglobin A1C 07/26/2023 5.4  <5.7 % of total Hgb Final    Comment: For the purpose of screening for the presence of  diabetes:     <5.7%       Consistent with the absence of diabetes  5.7-6.4%    Consistent with increased risk for diabetes              (prediabetes)  > or =6.5%  Consistent with diabetes     This assay result is consistent with a decreased risk  of diabetes. Currently, no consensus exists regarding use of  hemoglobin A1c for diagnosis of diabetes in children. According to American Diabetes Association (ADA)  guidelines, hemoglobin A1c <7.0% represents optimal  control in non-pregnant diabetic patients. Different  metrics may apply to specific patient populations. Standards of Medical Care in Diabetes(ADA).           Urine Culture, Comprehensive 07/26/2023    Final    CULTURE INDICATED - RESULTS TO FOLLOW    Urine Culture Result 07/26/2023  (A)   Final    Comment:     CULTURE, URINE, ROUTINE         Micro Number:      [id number]    Test Status:       Final    Specimen Source:   Urine    Specimen Quality:  Adequate    Result:            10,000-49,000 CFU/mL of Enterococcus faecalis                              E.faecalis                            ----------------                            INT   SULMA     AMPICILLIN             S <=2     NITROFURANTOIN         S     <=16     VANCOMYCIN             S     2    S=Susceptible  I=Intermediate  R=Resistant  * = Not Tested  NR = Not Reported  **NN = See Therapy Comments        Free t4 07/26/2023 1.5  0.8 - 1.8 ng/dL Final         Caron Fothergill, MD        "This note has been constructed using a voice recognition system. Therefore there may be syntax, spelling, and/or grammatical errors.  Please call if you have any questions. "

## 2023-10-17 NOTE — ASSESSMENT & PLAN NOTE
Patient with chronic low back pain currently taking Norco 1 tablet twice a day as needed we will renew the same

## 2023-10-24 ENCOUNTER — TELEPHONE (OUTPATIENT)
Age: 73
End: 2023-10-24

## 2023-11-06 DIAGNOSIS — G89.4 CHRONIC PAIN SYNDROME: ICD-10-CM

## 2023-11-06 RX ORDER — HYDROCODONE BITARTRATE AND ACETAMINOPHEN 5; 325 MG/1; MG/1
1 TABLET ORAL 2 TIMES DAILY
Qty: 20 TABLET | Refills: 0 | Status: SHIPPED | OUTPATIENT
Start: 2023-11-06 | End: 2023-11-15 | Stop reason: SDUPTHER

## 2023-11-15 ENCOUNTER — OFFICE VISIT (OUTPATIENT)
Dept: FAMILY MEDICINE CLINIC | Facility: CLINIC | Age: 73
End: 2023-11-15
Payer: MEDICARE

## 2023-11-15 VITALS
HEART RATE: 74 BPM | HEIGHT: 65 IN | BODY MASS INDEX: 36.49 KG/M2 | OXYGEN SATURATION: 98 % | SYSTOLIC BLOOD PRESSURE: 120 MMHG | DIASTOLIC BLOOD PRESSURE: 74 MMHG | WEIGHT: 219 LBS

## 2023-11-15 DIAGNOSIS — K21.9 GASTROESOPHAGEAL REFLUX DISEASE WITHOUT ESOPHAGITIS: ICD-10-CM

## 2023-11-15 DIAGNOSIS — F41.9 ANXIETY: ICD-10-CM

## 2023-11-15 DIAGNOSIS — G43.909 MIGRAINE WITHOUT STATUS MIGRAINOSUS, NOT INTRACTABLE, UNSPECIFIED MIGRAINE TYPE: ICD-10-CM

## 2023-11-15 DIAGNOSIS — G89.4 CHRONIC PAIN SYNDROME: Primary | ICD-10-CM

## 2023-11-15 DIAGNOSIS — R82.90 ABNORMAL FINDING ON URINALYSIS: ICD-10-CM

## 2023-11-15 DIAGNOSIS — G40.909 SEIZURE DISORDER (HCC): ICD-10-CM

## 2023-11-15 DIAGNOSIS — E03.9 ACQUIRED HYPOTHYROIDISM: ICD-10-CM

## 2023-11-15 DIAGNOSIS — M79.7 FIBROMYALGIA: ICD-10-CM

## 2023-11-15 DIAGNOSIS — E66.01 OBESITY, MORBID (HCC): ICD-10-CM

## 2023-11-15 DIAGNOSIS — T78.40XD ALLERGY, SUBSEQUENT ENCOUNTER: ICD-10-CM

## 2023-11-15 DIAGNOSIS — F11.20 CONTINUOUS OPIOID DEPENDENCE (HCC): ICD-10-CM

## 2023-11-15 PROCEDURE — 99214 OFFICE O/P EST MOD 30 MIN: CPT | Performed by: INTERNAL MEDICINE

## 2023-11-15 RX ORDER — DULOXETIN HYDROCHLORIDE 30 MG/1
30 CAPSULE, DELAYED RELEASE ORAL EVERY MORNING
Qty: 90 CAPSULE | Refills: 1 | Status: SHIPPED | OUTPATIENT
Start: 2023-11-15

## 2023-11-15 RX ORDER — HYDROCODONE BITARTRATE AND ACETAMINOPHEN 5; 325 MG/1; MG/1
1 TABLET ORAL 2 TIMES DAILY
Qty: 20 TABLET | Refills: 0 | Status: SHIPPED | OUTPATIENT
Start: 2023-11-15 | End: 2023-11-25

## 2023-11-15 RX ORDER — MONTELUKAST SODIUM 10 MG/1
10 TABLET ORAL EVERY MORNING
Qty: 90 TABLET | Refills: 1 | Status: SHIPPED | OUTPATIENT
Start: 2023-11-15

## 2023-11-15 RX ORDER — PANTOPRAZOLE SODIUM 40 MG/1
40 TABLET, DELAYED RELEASE ORAL DAILY
Qty: 90 TABLET | Refills: 1 | Status: SHIPPED | OUTPATIENT
Start: 2023-11-15

## 2023-11-15 RX ORDER — LEVETIRACETAM 500 MG/1
500 TABLET ORAL 2 TIMES DAILY
Qty: 180 TABLET | Refills: 1 | Status: SHIPPED | OUTPATIENT
Start: 2023-11-15

## 2023-11-15 RX ORDER — LEVOTHYROXINE SODIUM 0.1 MG/1
100 TABLET ORAL EVERY MORNING
Qty: 90 TABLET | Refills: 1 | Status: SHIPPED | OUTPATIENT
Start: 2023-11-15

## 2023-11-15 NOTE — ASSESSMENT & PLAN NOTE
Awaiting a repeat urine analysis patient at present time asymptomatic urine analysis in the past has shown trace blood and  Enterococcus faecalis

## 2023-11-15 NOTE — ASSESSMENT & PLAN NOTE
Continue Protonix recommend patient to go for an upper and lower endoscopy she is going to schedule soon.

## 2023-11-15 NOTE — ASSESSMENT & PLAN NOTE
Patient with chronic low back pain currently taking Norco 1 tablet twice a day as needed without pain medication patient is having difficulty to move around we will renew the medication

## 2023-11-15 NOTE — PROGRESS NOTES
Office Visit Note  11/15/23     Itz Ceron 68 y.o. female MRN: [id number]  : 1950    Assessment:     1. Chronic pain syndrome  Assessment & Plan:  Patient with chronic low back pain currently taking Norco 1 tablet twice a day as needed without pain medication patient is having difficulty to move around we will renew the medication    Orders:  -     HYDROcodone-acetaminophen (NORCO) 5-325 mg per tablet; Take 1 tablet by mouth 2 (two) times a day for 10 days Max Daily Amount: 2 tablets    2. Abnormal finding on urinalysis  Assessment & Plan:  Awaiting a repeat urine analysis patient at present time asymptomatic urine analysis in the past has shown trace blood and  Enterococcus faecalis      3. Allergy, subsequent encounter  Assessment & Plan:  Patient is on Singulair and Flonase nasal we will continue the same    Orders:  -     montelukast (SINGULAIR) 10 mg tablet; Take 1 tablet (10 mg total) by mouth every morning    4. Continuous opioid dependence ([address])    5. Fibromyalgia  Assessment & Plan:  Patient is on Cymbalta 30 mg we will continue the same      6. Gastroesophageal reflux disease without esophagitis  Assessment & Plan:  Continue Protonix recommend patient to go for an upper and lower endoscopy she is going to schedule soon. Orders:  -     pantoprazole (PROTONIX) 40 mg tablet; Take 1 tablet (40 mg total) by mouth daily    7. Migraine without status migrainosus, not intractable, unspecified migraine type  Assessment & Plan:  History of migraine headaches stable at present time      8. Obesity, morbid ([address])  Assessment & Plan:  BMI is 36.44 same as before emphasize regarding diet exercise lifestyle modification lose weight      9. Seizure disorder ([address])  Assessment & Plan:  Continue Keppra 500 mg twice a day    Orders:  -     levETIRAcetam (KEPPRA) 500 mg tablet; Take 1 tablet (500 mg total) by mouth 2 (two) times a day    10.  Anxiety  -     DULoxetine (CYMBALTA) 30 mg delayed release capsule; Take 1 capsule (30 mg total) by mouth every morning    11. Acquired hypothyroidism  Assessment & Plan:  Continue levothyroxine 100 mcg daily    Orders:  -     levothyroxine 100 mcg tablet; Take 1 tablet (100 mcg total) by mouth every morning               Discussion Summary and Plan: Today's care plan and medications were reviewed with patient in detail and all their questions answered to their satisfaction. Chief Complaint   Patient presents with    Follow-up      Subjective:  Patient is coming for a follow-up evaluation with a history of chronic pain syndrome fibromyalgia and also history of GERD and seizure disorder. History of allergies and asthma she did not have to use the inhaler in the recent past.  No chest pains palpitation shortness of breath. Medications reviewed labs reviewed    All medications reviewed and renewed them        The following portions of the patient's history were reviewed and updated as appropriate: allergies, current medications, past family history, past medical history, past social history, past surgical history and problem list.    Review of Systems   Constitutional:  Negative for chills and fever. HENT:  Negative for ear pain and sore throat. Eyes:  Negative for pain and visual disturbance. Respiratory:  Negative for cough and shortness of breath. Cardiovascular:  Negative for chest pain and palpitations. Gastrointestinal:  Negative for abdominal pain and vomiting. Genitourinary:  Negative for dysuria and hematuria. Musculoskeletal:  Positive for arthralgias and back pain. Skin:  Negative for color change and rash. Neurological:  Negative for seizures and syncope. All other systems reviewed and are negative.         Historical Information   Patient Active Problem List   Diagnosis    Chronic pain syndrome    Acquired hypothyroidism    Seizure disorder ([address])    Gastroesophageal reflux disease without esophagitis    Allergies    Fibromyalgia    Migraines Continuous opioid dependence (HCC)    Obesity, morbid (HCC)    Abnormal finding on urinalysis     Past Medical History:   Diagnosis Date    Anxiety with depression     Arm fracture, left     x2    Arthritis     Degenerative joint disease     Encounter for immunization 10/18/2022    Fibromyalgia     GERD (gastroesophageal reflux disease)     Hypertension     Hypothyroidism     Kidney stones     Low back pain     Seasonal allergies     Seizure disorder (HCC)      Past Surgical History:   Procedure Laterality Date    COLONOSCOPY      DILATION AND CURETTAGE OF UTERUS      NECK SURGERY      Plate    ORIF FOREARM FRACTURE Left     SHOULDER SURGERY      x2    TONSILLECTOMY       Social History     Substance and Sexual Activity   Alcohol Use Yes    Comment: Socially/very rare     Social History     Substance and Sexual Activity   Drug Use Never     Social History     Tobacco Use   Smoking Status Former    Types: Cigarettes    Quit date:     Years since quittin.8    Passive exposure: Past   Smokeless Tobacco Never     Family History   Problem Relation Age of Onset    Stroke Mother     Heart disease Father      Health Maintenance Due   Topic    Hepatitis C Screening     Breast Cancer Screening: Mammogram     Osteoporosis Screening     Pneumococcal Vaccine: 65+ Years (1 - PCV)    COVID-19 Vaccine (3 - Moderna series)    Colorectal Cancer Screening     Depression Screening       Meds/Allergies       Current Outpatient Medications:     albuterol (PROVENTIL HFA,VENTOLIN HFA) 90 mcg/act inhaler, Inhale 2 puffs every 6 (six) hours as needed for wheezing, Disp: 8 g, Rfl: 1    Cholecalciferol (Vitamin D3) 50 MCG (2000 UT) TABS, Take 2,000 Units by mouth in the morning, Disp: , Rfl:     DULoxetine (CYMBALTA) 30 mg delayed release capsule, Take 1 capsule (30 mg total) by mouth every morning, Disp: 90 capsule, Rfl: 1    fluticasone (FLONASE) 50 mcg/act nasal spray, USE 2 SPRAYS IN EACH NOSTRIL EVERY DAY, Disp: 32 g, Rfl: 3 HYDROcodone-acetaminophen (NORCO) 5-325 mg per tablet, Take 1 tablet by mouth 2 (two) times a day for 10 days Max Daily Amount: 2 tablets, Disp: 20 tablet, Rfl: 0    levETIRAcetam (KEPPRA) 500 mg tablet, Take 1 tablet (500 mg total) by mouth 2 (two) times a day, Disp: 180 tablet, Rfl: 1    levothyroxine 100 mcg tablet, Take 1 tablet (100 mcg total) by mouth every morning, Disp: 90 tablet, Rfl: 1    Magnesium 400 MG TABS, Take 1 tablet (400 mg total) by mouth in the morning, Disp: 90 tablet, Rfl: 0    montelukast (SINGULAIR) 10 mg tablet, Take 1 tablet (10 mg total) by mouth every morning, Disp: 90 tablet, Rfl: 1    pantoprazole (PROTONIX) 40 mg tablet, Take 1 tablet (40 mg total) by mouth daily, Disp: 90 tablet, Rfl: 1    vitamin B-12 (VITAMIN B-12) 1,000 mcg tablet, Take 1 tablet (1,000 mcg total) by mouth daily, Disp: 90 tablet, Rfl: 1      Objective:    Vitals:   /74   Pulse 74   Ht 5' 5" (1.651 m)   Wt 99.3 kg (219 lb)   SpO2 98%   BMI 36.44 kg/m²   Body mass index is 36.44 kg/m². Vitals:    11/15/23 1114   Weight: 99.3 kg (219 lb)       Physical Exam  Vitals and nursing note reviewed. Constitutional:       Appearance: Normal appearance. Cardiovascular:      Rate and Rhythm: Normal rate and regular rhythm. Heart sounds: Normal heart sounds. Pulmonary:      Effort: Pulmonary effort is normal.      Breath sounds: Normal breath sounds. Abdominal:      General: Abdomen is flat. Palpations: Abdomen is soft. Musculoskeletal:      Cervical back: Normal range of motion and neck supple. Right lower leg: No edema. Left lower leg: No edema. Comments: SLR about 30 degrees both sides   Skin:     General: Skin is warm and dry. Neurological:      Mental Status: She is alert and oriented to person, place, and time. Lab Review   No visits with results within 2 Month(s) from this visit.    Latest known visit with results is:   Orders Only on 07/26/2023   Component Date Value Ref Range Status    Total Cholesterol 07/26/2023 184  <200 mg/dL Final    HDL 07/26/2023 62  > OR = 50 mg/dL Final    Triglycerides 07/26/2023 83  <150 mg/dL Final    LDL Calculated 07/26/2023 105 (H)  mg/dL (calc) Final    Comment: Reference range: <100     Desirable range <100 mg/dL for primary prevention;    <70 mg/dL for patients with CHD or diabetic patients   with > or = 2 CHD risk factors. LDL-C is now calculated using the Rayray-Lehman   calculation, which is a validated novel method providing   better accuracy than the Friedewald equation in the   estimation of LDL-C. Xuan Mendoza et al. Luiza Eveline. 7982;277(21): 4975-8915   (http://education. Tugg/faq/OEJ377)      Chol HDLC Ratio 07/26/2023 3.0  <5.0 (calc) Final    Non-HDL Cholesterol 07/26/2023 122  <130 mg/dL (calc) Final    Comment: For patients with diabetes plus 1 major ASCVD risk   factor, treating to a non-HDL-C goal of <100 mg/dL   (LDL-C of <70 mg/dL) is considered a therapeutic   option. Glucose, Random 07/26/2023 96  65 - 99 mg/dL Final    Comment:               Fasting reference interval         BUN 07/26/2023 15  7 - 25 mg/dL Final    Creatinine 07/26/2023 0.97  0.60 - 1.00 mg/dL Final    eGFR 07/26/2023 62  > OR = 60 mL/min/1.73m2 Final    Comment: The eGFR is based on the CKD-EPI 2021 equation. To calculate   the new eGFR from a previous Creatinine or Cystatin C  result, go to [employer].at. org/professionals/  kdoqi/gfr%5Fcalculator      SL AMB BUN/CREATININE RATIO 09/43/8133 NOT APPLICABLE  6 - 22 (calc) Final    Sodium 07/26/2023 142  135 - 146 mmol/L Final    Potassium 07/26/2023 4.3  3.5 - 5.3 mmol/L Final    Chloride 07/26/2023 103  98 - 110 mmol/L Final    CO2 07/26/2023 33 (H)  20 - 32 mmol/L Final    Calcium 07/26/2023 9.5  8.6 - 10.4 mg/dL Final    Protein, Total 07/26/2023 6.7  6.1 - 8.1 g/dL Final    Albumin 07/26/2023 3.9  3.6 - 5.1 g/dL Final    Globulin 07/26/2023 2.8  1.9 - 3.7 g/dL (calc) Final Albumin/Globulin Ratio 07/26/2023 1.4  1.0 - 2.5 (calc) Final    TOTAL BILIRUBIN 07/26/2023 0.4  0.2 - 1.2 mg/dL Final    Alkaline Phosphatase 07/26/2023 65  37 - 153 U/L Final    AST 07/26/2023 17  10 - 35 U/L Final    ALT 07/26/2023 18  6 - 29 U/L Final    Color UA 07/26/2023 YELLOW  YELLOW Final    Urine Appearance 07/26/2023 CLEAR  CLEAR Final    Specific Gravity 07/26/2023 1.008  1.001 - 1.035 Final    Ph 07/26/2023 6.0  5.0 - 8.0 Final    Glucose, Urine 07/26/2023 NEGATIVE  NEGATIVE Final    Bilirubin, Urine 07/26/2023 NEGATIVE  NEGATIVE Final    Ketone, Urine 07/26/2023 NEGATIVE  NEGATIVE Final    Blood, Urine 07/26/2023 TRACE (A)  NEGATIVE Final    Protein, Urine 07/26/2023 NEGATIVE  NEGATIVE Final    Nitrites Urine 07/26/2023 NEGATIVE  NEGATIVE Final    Leukocyte Esterase 07/26/2023 2+ (A)  NEGATIVE Final    SL AMB WBC, URINE 07/26/2023 0-5  < OR = 5 /HPF Final    RBC, Urine 07/26/2023 NONE SEEN  < OR = 2 /HPF Final    Squamous Epithelial Cells 07/26/2023 6-10 (A)  < OR = 5 /HPF Final    Bacteria, UA 07/26/2023 NONE SEEN  NONE SEEN /HPF Final    Hyaline Casts 07/26/2023 NONE SEEN  NONE SEEN /LPF Final    Comment(s) 07/26/2023    Final    Comment: This urine was analyzed for the presence of WBC,   RBC, bacteria, casts, and other formed elements. Only those elements seen were reported.             White Blood Cell Count 07/26/2023 5.9  3.8 - 10.8 Thousand/uL Final    Red Blood Cell Count 07/26/2023 4.52  3.80 - 5.10 Million/uL Final    Hemoglobin 07/26/2023 13.7  11.7 - 15.5 g/dL Final    HCT 07/26/2023 40.8  35.0 - 45.0 % Final    MCV 07/26/2023 90.3  80.0 - 100.0 fL Final    MCH 07/26/2023 30.3  27.0 - 33.0 pg Final    MCHC 07/26/2023 33.6  32.0 - 36.0 g/dL Final    RDW 07/26/2023 13.0  11.0 - 15.0 % Final    Platelet Count 18/08/2397 211  140 - 400 Thousand/uL Final    SL AMB MPV 07/26/2023 12.6 (H)  7.5 - 12.5 fL Final    Neutrophils (Absolute) 07/26/2023 3,245  1,500 - 7,800 cells/uL Final Lymphocytes (Absolute) 07/26/2023 1,788  850 - 3,900 cells/uL Final    Monocytes (Absolute) 07/26/2023 696  200 - 950 cells/uL Final    Eosinophils (Absolute) 07/26/2023 153  15 - 500 cells/uL Final    Basophils ABS 07/26/2023 18  0 - 200 cells/uL Final    Neutrophils 07/26/2023 55  % Final    Lymphocytes 07/26/2023 30.3  % Final    Monocytes 07/26/2023 11.8  % Final    Eosinophils 07/26/2023 2.6  % Final    Basophils PCT 07/26/2023 0.3  % Final    TSH W/RFX TO FREE T4 07/26/2023 0.37 (L)  0.40 - 4.50 mIU/L Final    Hemoglobin A1C 07/26/2023 5.4  <5.7 % of total Hgb Final    Comment: For the purpose of screening for the presence of  diabetes:     <5.7%       Consistent with the absence of diabetes  5.7-6.4%    Consistent with increased risk for diabetes              (prediabetes)  > or =6.5%  Consistent with diabetes     This assay result is consistent with a decreased risk  of diabetes. Currently, no consensus exists regarding use of  hemoglobin A1c for diagnosis of diabetes in children. According to American Diabetes Association (ADA)  guidelines, hemoglobin A1c <7.0% represents optimal  control in non-pregnant diabetic patients. Different  metrics may apply to specific patient populations. Standards of Medical Care in Diabetes(ADA).           Urine Culture, Comprehensive 07/26/2023    Final    CULTURE INDICATED - RESULTS TO FOLLOW    Urine Culture Result 07/26/2023  (A)   Final    Comment:     CULTURE, URINE, ROUTINE         Micro Number:      [id number]    Test Status:       Final    Specimen Source:   Urine    Specimen Quality:  Adequate    Result:            10,000-49,000 CFU/mL of Enterococcus faecalis                              E.faecalis                            ----------------                            INT   SULMA     AMPICILLIN             S     <=2     NITROFURANTOIN         S     <=16     VANCOMYCIN             S     2    S=Susceptible  I=Intermediate  R=Resistant  * = Not Tested  NR = Not Reported  **NN = See Therapy Comments        Free t4 07/26/2023 1.5  0.8 - 1.8 ng/dL Final         Jory Neri MD        "This note has been constructed using a voice recognition system. Therefore there may be syntax, spelling, and/or grammatical errors.  Please call if you have any questions. "

## 2023-11-20 ENCOUNTER — TELEPHONE (OUTPATIENT)
Age: 73
End: 2023-11-20

## 2023-11-20 NOTE — TELEPHONE ENCOUNTER
The patient report that she will running out of her medication on Dec 1. She would like to know if the provider can sent her another refills since her appointment in Dec 5.

## 2023-11-27 ENCOUNTER — OFFICE VISIT (OUTPATIENT)
Dept: FAMILY MEDICINE CLINIC | Facility: CLINIC | Age: 73
End: 2023-11-27
Payer: MEDICARE

## 2023-11-27 VITALS
HEART RATE: 83 BPM | SYSTOLIC BLOOD PRESSURE: 118 MMHG | OXYGEN SATURATION: 93 % | DIASTOLIC BLOOD PRESSURE: 72 MMHG | HEIGHT: 65 IN | BODY MASS INDEX: 36.85 KG/M2 | WEIGHT: 221.2 LBS

## 2023-11-27 DIAGNOSIS — R82.90 ABNORMAL FINDING ON URINALYSIS: ICD-10-CM

## 2023-11-27 DIAGNOSIS — T78.40XD ALLERGY, SUBSEQUENT ENCOUNTER: ICD-10-CM

## 2023-11-27 DIAGNOSIS — G89.4 CHRONIC PAIN SYNDROME: Primary | ICD-10-CM

## 2023-11-27 DIAGNOSIS — Z12.31 SCREENING MAMMOGRAM FOR BREAST CANCER: ICD-10-CM

## 2023-11-27 DIAGNOSIS — G40.909 SEIZURE DISORDER (HCC): ICD-10-CM

## 2023-11-27 DIAGNOSIS — K21.9 GASTROESOPHAGEAL REFLUX DISEASE WITHOUT ESOPHAGITIS: ICD-10-CM

## 2023-11-27 DIAGNOSIS — E03.9 ACQUIRED HYPOTHYROIDISM: ICD-10-CM

## 2023-11-27 DIAGNOSIS — G43.909 MIGRAINE WITHOUT STATUS MIGRAINOSUS, NOT INTRACTABLE, UNSPECIFIED MIGRAINE TYPE: ICD-10-CM

## 2023-11-27 DIAGNOSIS — E66.01 OBESITY, MORBID (HCC): ICD-10-CM

## 2023-11-27 DIAGNOSIS — Z12.11 ENCOUNTER FOR SCREENING COLONOSCOPY: ICD-10-CM

## 2023-11-27 DIAGNOSIS — M81.0 AGE-RELATED OSTEOPOROSIS WITHOUT CURRENT PATHOLOGICAL FRACTURE: ICD-10-CM

## 2023-11-27 DIAGNOSIS — M79.7 FIBROMYALGIA: ICD-10-CM

## 2023-11-27 PROCEDURE — 99213 OFFICE O/P EST LOW 20 MIN: CPT | Performed by: INTERNAL MEDICINE

## 2023-11-27 RX ORDER — HYDROCODONE BITARTRATE AND ACETAMINOPHEN 5; 325 MG/1; MG/1
1 TABLET ORAL 2 TIMES DAILY PRN
Qty: 60 TABLET | Refills: 0 | Status: SHIPPED | OUTPATIENT
Start: 2023-11-27

## 2023-11-27 NOTE — PROGRESS NOTES
Name: Rox Habermann      : 1950      MRN: [id number]  Encounter Provider: Erik Higginbotham MD  Encounter Date: 2023   Encounter department: [address] [place] [address] Road     1. Chronic pain syndrome  Assessment & Plan:  Patient with chronic low back pain on Norco 1 tablet twice a day as needed for pain try to wean her off the medication in the past did not help she is having increasing pain discomfort symptoms we will continue the Norco    Orders:  -     HYDROcodone-acetaminophen ([address]) 5-325 mg per tablet; Take 1 tablet by mouth 2 (two) times a day as needed for pain Max Daily Amount: 2 tablets    2. Screening mammogram for breast cancer  -     Mammo screening bilateral w 3d & cad; Future; Expected date: 2023    3. Abnormal finding on urinalysis  Assessment & Plan:  I ordered the repeat urine analysis patient has not done it yet      4. Gastroesophageal reflux disease without esophagitis  Assessment & Plan:  Continue Protonix    Orders:  -     Ambulatory referral to Gastroenterology; Future    5. Acquired hypothyroidism  Assessment & Plan:  Continue levothyroxine 100 mcg daily follow-up with the labs later      6. Allergy, subsequent encounter  Assessment & Plan:  Continue Singulair and Flonase      7. Age-related osteoporosis without current pathological fracture  -     DXA bone density spine hip and pelvis; Future; Expected date: 2023    8. Fibromyalgia  Assessment & Plan:  Continue Cymbalta 30 mg daily      9. Migraine without status migrainosus, not intractable, unspecified migraine type    10. Obesity, morbid ([address])  Assessment & Plan:  Discussed with patient regarding diet exercise lifestyle modification      11. Seizure disorder [facility])  Assessment & Plan:  Patient on Keppra      12. Encounter for screening colonoscopy  -     Ambulatory referral to Gastroenterology;  Future           Subjective      Patient is coming here for a follow-up evaluation with regards to chronic pain syndrome low back pain persistent response to the pain medication try to go home to medication not able to do so not able to do her day-to-day activities without taking the pain medication. No chest pains palpitation shortness of breath no nausea vomiting medications reviewed labs reviewed. Patient has not gone for colonoscopy yet did not go for the mammogram and the DEXA scan yet we are reordering all 3 of them at this time discussed with patient at length for getting the same. Medications regarding pain discussed with patient we will renew the same      Review of Systems   Constitutional:  Negative for chills and fever. HENT:  Negative for ear pain and sore throat. Eyes:  Negative for pain and visual disturbance. Respiratory:  Negative for cough and shortness of breath. Cardiovascular:  Negative for chest pain and palpitations. Gastrointestinal:  Negative for abdominal pain and vomiting. Genitourinary:  Negative for dysuria and hematuria. Musculoskeletal:  Negative for arthralgias and back pain. Skin:  Negative for color change and rash. Neurological:  Negative for seizures and syncope. All other systems reviewed and are negative.       Current Outpatient Medications on File Prior to Visit   Medication Sig    albuterol (PROVENTIL HFA,VENTOLIN HFA) 90 mcg/act inhaler Inhale 2 puffs every 6 (six) hours as needed for wheezing    Cholecalciferol (Vitamin D3) 50 MCG (2000 UT) TABS Take 2,000 Units by mouth in the morning    DULoxetine (CYMBALTA) 30 mg delayed release capsule Take 1 capsule (30 mg total) by mouth every morning    fluticasone (FLONASE) 50 mcg/act nasal spray USE 2 SPRAYS IN EACH NOSTRIL EVERY DAY    levETIRAcetam (KEPPRA) 500 mg tablet Take 1 tablet (500 mg total) by mouth 2 (two) times a day    levothyroxine 100 mcg tablet Take 1 tablet (100 mcg total) by mouth every morning    Magnesium 400 MG TABS Take 1 tablet (400 mg total) by mouth in the morning montelukast (SINGULAIR) 10 mg tablet Take 1 tablet (10 mg total) by mouth every morning    pantoprazole (PROTONIX) 40 mg tablet Take 1 tablet (40 mg total) by mouth daily    vitamin B-12 (VITAMIN B-12) 1,000 mcg tablet Take 1 tablet (1,000 mcg total) by mouth daily       Objective     /72 (BP Location: Right arm, Patient Position: Sitting, Cuff Size: Standard)   Pulse 83   Ht 5' 5" (1.651 m)   Wt 100 kg (221 lb 3.2 oz)   SpO2 93%   BMI 36.81 kg/m²     Physical Exam  Vitals and nursing note reviewed. Constitutional:       Appearance: Normal appearance. Cardiovascular:      Rate and Rhythm: Normal rate and regular rhythm. Heart sounds: Normal heart sounds. Pulmonary:      Effort: Pulmonary effort is normal.      Breath sounds: Normal breath sounds. Musculoskeletal:      Cervical back: Normal range of motion and neck supple. Right lower leg: No edema. Left lower leg: No edema. Comments: SLR about 30 degrees both sides   Neurological:      Mental Status: She is alert and oriented to person, place, and time.        Kristi Barakat MD

## 2023-11-27 NOTE — ASSESSMENT & PLAN NOTE
Patient with chronic low back pain on Norco 1 tablet twice a day as needed for pain try to wean her off the medication in the past did not help she is having increasing pain discomfort symptoms we will continue the Norco

## 2023-12-18 ENCOUNTER — RA CDI HCC (OUTPATIENT)
Dept: OTHER | Facility: HOSPITAL | Age: 73
End: 2023-12-18

## 2023-12-18 ENCOUNTER — OFFICE VISIT (OUTPATIENT)
Dept: GASTROENTEROLOGY | Facility: CLINIC | Age: 73
End: 2023-12-18
Payer: MEDICARE

## 2023-12-18 ENCOUNTER — TELEPHONE (OUTPATIENT)
Dept: GASTROENTEROLOGY | Facility: CLINIC | Age: 73
End: 2023-12-18

## 2023-12-18 VITALS
DIASTOLIC BLOOD PRESSURE: 68 MMHG | BODY MASS INDEX: 37.19 KG/M2 | WEIGHT: 223.2 LBS | SYSTOLIC BLOOD PRESSURE: 112 MMHG | HEART RATE: 70 BPM | HEIGHT: 65 IN

## 2023-12-18 DIAGNOSIS — K57.90 DIVERTICULOSIS: ICD-10-CM

## 2023-12-18 DIAGNOSIS — Z12.11 COLON CANCER SCREENING: ICD-10-CM

## 2023-12-18 DIAGNOSIS — K21.9 HIATAL HERNIA WITH GERD: Primary | ICD-10-CM

## 2023-12-18 DIAGNOSIS — K44.9 HIATAL HERNIA WITH GERD: Primary | ICD-10-CM

## 2023-12-18 DIAGNOSIS — K22.70 BARRETT'S ESOPHAGUS WITHOUT DYSPLASIA: ICD-10-CM

## 2023-12-18 DIAGNOSIS — Z86.010 HX OF COLONIC POLYP: ICD-10-CM

## 2023-12-18 PROBLEM — Z86.0100 HX OF COLONIC POLYP: Status: ACTIVE | Noted: 2023-12-18

## 2023-12-18 PROCEDURE — 99214 OFFICE O/P EST MOD 30 MIN: CPT | Performed by: NURSE PRACTITIONER

## 2023-12-18 NOTE — PATIENT INSTRUCTIONS
Continue Preparation H as needed for hemorrhoids  Continue to follow antireflux diet and measures-limit coffee 1 to 2 cups a day.  Avoid caffeine, chocolate, carbonated beverages, fried foods and tomato based products.  Do not eat 3 hours prior to going to bed.  Sleep with your head of bed elevated on multiple pillows.  Whenever you stay sitting upright for 1 hour afterwards.   MiraLAX and Dulcolax bowel prep

## 2023-12-18 NOTE — PROGRESS NOTES
[facility] - Outpatient Follow-up Note  Maryanne Marcos 73 y.o. female MRN: [id number]  Encounter: [id number]          ASSESSMENT AND PLAN:      1. Hiatal hernia with GERD  2. Carson's esophagus without dysplasia  Patient has history of hiatal hernia with GERD and Carson's esophagus.  Patient currently reports that her symptoms of GERD are well-controlled and when she eats something she should not or unless she overeats.  Patient denies nausea, vomiting, acid reflux, heartburn, epigastric or abdominal pain.  EGD done 10/22/2018 showed Carson's esophagus and sliding hiatal hernia.  - EGD notable for EGD for further evaluation of Carson's esophagus and GERD.  Prep and procedure explained to patient in detail.  Further recommendations pending results of EGD.  -Continue to follow antireflux diet and measures.  -Continue pantoprazole 40 Mg daily    3. Diverticulosis  Patient has history of sigmoid  Diverticulosis.  Patient currently denies any lower abdominal pain.  - Colonoscopy for further evaluation of diverticulosis.  -High-fiber diet  -MiraLAX and Dulcolax bowel prep    4. Hx of colonic polyp  Patient has history of colonic polyps.  Colonoscopy done 10/22/2018 showed sigmoid diverticulosis and rectal polyp  - Colonoscopy; Scheduled for colonoscopy.  Prep and procedure explained to patient in detail.  Further recommendations pending results of colonoscopy.   -MiraLAX and Dulcolax bowel prep    5. Colon cancer screening  Up to date    Follow-up 6 to 8 weeks after procedure.  ______________________________________________________________________    SUBJECTIVE: This is a 73-year-old female who presents to the office for follow-up.  Patient has history of hiatal hernia with GERD and Carson's esophagus.  Patient currently reports that her symptoms of GERD are well-controlled and when she eats something she should not or unless she overeats.  Patient denies nausea, vomiting, acid reflux,  heartburn, epigastric or abdominal pain.  Patient has history of diverticulosis and colonic polyps.  Patient is overdue for colonoscopy.  Patient denies blood in stool or black tarry stool.  Patient does have history of hemorrhoids and will occasionally have a small amount of blood on toilet tissue when wiping.  Abdomen exam benign no abdominal tenderness or guarding.    Colonoscopy done 10/22/2018 showed sigmoid diverticulosis and rectal polyp.  Biopsy showed hyperplastic polyp.EGD done 10/22/2018 showed Carson's esophagus and sliding hiatal hernia.  Biopsy showed Carson's esophagus.  No H. pylori.  No malignancy.  Patient is a former smoker.  Patient drinks alcohol socially on very rare occasions.  Patient denies illicit drug use or marijuana use.  No family history of gastric or colon cancer.    REVIEW OF SYSTEMS IS OTHERWISE NEGATIVE.      Historical Information   Past Medical History:   Diagnosis Date    Anxiety with depression     Arm fracture, left     x2    Arthritis     Degenerative joint disease     Encounter for immunization 10/18/2022    Fibromyalgia     GERD (gastroesophageal reflux disease)     Hypertension     Hypothyroidism     Kidney stones     Low back pain     Seasonal allergies     Seizure disorder (HCC)      Past Surgical History:   Procedure Laterality Date    COLONOSCOPY      DILATION AND CURETTAGE OF UTERUS      NECK SURGERY      Plate    ORIF FOREARM FRACTURE Left     SHOULDER SURGERY      x2    TONSILLECTOMY       Social History   Social History     Substance and Sexual Activity   Alcohol Use Yes    Comment: Socially/very rare     Social History     Substance and Sexual Activity   Drug Use Never     Social History     Tobacco Use   Smoking Status Former    Current packs/day: 0.00    Types: Cigarettes    Quit date:     Years since quittin.9    Passive exposure: Past   Smokeless Tobacco Never     Family History   Problem Relation Age of Onset    Stroke Mother     Heart disease  "Father        Meds/Allergies       Current Outpatient Medications:     albuterol (PROVENTIL HFA,VENTOLIN HFA) 90 mcg/act inhaler    Cholecalciferol (Vitamin D3) 50 MCG (2000 UT) TABS    DULoxetine (CYMBALTA) 30 mg delayed release capsule    fluticasone (FLONASE) 50 mcg/act nasal spray    HYDROcodone-acetaminophen (NORCO) 5-325 mg per tablet    levETIRAcetam (KEPPRA) 500 mg tablet    levothyroxine 100 mcg tablet    Magnesium 400 MG TABS    montelukast (SINGULAIR) 10 mg tablet    pantoprazole (PROTONIX) 40 mg tablet    vitamin B-12 (VITAMIN B-12) 1,000 mcg tablet    Allergies   Allergen Reactions    Beta Adrenergic Blockers Shortness Of Breath    Sulfamethoxazole-Trimethoprim Shortness Of Breath    Meloxicam Confusion           Objective     Blood pressure 112/68, pulse 70, height 5' 5\" (1.651 m), weight 101 kg (223 lb 3.2 oz). Body mass index is 37.14 kg/m².      PHYSICAL EXAM:      General Appearance:   Alert, cooperative, no distress   HEENT:   Normocephalic, atraumatic, anicteric.     Neck:  Supple, symmetrical, trachea midline   Lungs:   Clear to auscultation bilaterally; no rales, rhonchi or wheezing; respirations unlabored    Heart::   Regular rate and rhythm; no murmur, rub, or gallop.   Abdomen:   Soft, non-tender, non-distended; normal bowel sounds; no masses, no organomegaly    Genitalia:   Deferred    Rectal:   Deferred    Extremities:  No cyanosis, clubbing or edema    Pulses:  2+ and symmetric    Skin:  No jaundice, rashes, or lesions    Lymph nodes:  No palpable cervical lymphadenopathy        Lab Results:   No visits with results within 1 Day(s) from this visit.   Latest known visit with results is:   Orders Only on 07/26/2023   Component Date Value    Total Cholesterol 07/26/2023 184     HDL 07/26/2023 62     Triglycerides 07/26/2023 83     LDL Calculated 07/26/2023 105 (H)     Chol HDLC Ratio 07/26/2023 3.0     Non-HDL Cholesterol 07/26/2023 122     Glucose, Random 07/26/2023 96     BUN 07/26/2023 15 "     Creatinine 07/26/2023 0.97     eGFR 07/26/2023 62     SL AMB BUN/CREATININE RA* 07/26/2023 NOT APPLICABLE     Sodium 07/26/2023 142     Potassium 07/26/2023 4.3     Chloride 07/26/2023 103     CO2 07/26/2023 33 (H)     Calcium 07/26/2023 9.5     Protein, Total 07/26/2023 6.7     Albumin 07/26/2023 3.9     Globulin 07/26/2023 2.8     Albumin/Globulin Ratio 07/26/2023 1.4     TOTAL BILIRUBIN 07/26/2023 0.4     Alkaline Phosphatase 07/26/2023 65     AST 07/26/2023 17     ALT 07/26/2023 18     Color UA 07/26/2023 YELLOW     Urine Appearance 07/26/2023 CLEAR     Specific Gravity 07/26/2023 1.008     Ph 07/26/2023 6.0     Glucose, Urine 07/26/2023 NEGATIVE     Bilirubin, Urine 07/26/2023 NEGATIVE     Ketone, Urine 07/26/2023 NEGATIVE     Blood, Urine 07/26/2023 TRACE (A)     Protein, Urine 07/26/2023 NEGATIVE     Nitrites Urine 07/26/2023 NEGATIVE     Leukocyte Esterase 07/26/2023 2+ (A)     SL AMB WBC, URINE 07/26/2023 0-5     RBC, Urine 07/26/2023 NONE SEEN     Squamous Epithelial Cells 07/26/2023 6-10 (A)     Bacteria, UA 07/26/2023 NONE SEEN     Hyaline Casts 07/26/2023 NONE SEEN     Comment(s) 07/26/2023      White Blood Cell Count 07/26/2023 5.9     Red Blood Cell Count 07/26/2023 4.52     Hemoglobin 07/26/2023 13.7     HCT 07/26/2023 40.8     MCV 07/26/2023 90.3     MCH 07/26/2023 30.3     MCHC 07/26/2023 33.6     RDW 07/26/2023 13.0     Platelet Count 07/26/2023 211     SL AMB MPV 07/26/2023 12.6 (H)     Neutrophils (Absolute) 07/26/2023 3,245     Lymphocytes (Absolute) 07/26/2023 1,788     Monocytes (Absolute) 07/26/2023 696     Eosinophils (Absolute) 07/26/2023 153     Basophils ABS 07/26/2023 18     Neutrophils 07/26/2023 55     Lymphocytes 07/26/2023 30.3     Monocytes 07/26/2023 11.8     Eosinophils 07/26/2023 2.6     Basophils PCT 07/26/2023 0.3     TSH W/RFX TO FREE T4 07/26/2023 0.37 (L)     Hemoglobin A1C 07/26/2023 5.4     Urine Culture, Comprehen* 07/26/2023      Urine Culture Result 07/26/2023  (A)      Free t4 07/26/2023 1.5          Radiology Results:   No results found.

## 2023-12-18 NOTE — TELEPHONE ENCOUNTER
Scheduled date of EGD/colonoscopy (as of today):02/06/24  Physician performing EGD/colonoscopy:Dr. Sawyer  Location of EGD/colonoscopy:[facility]  Desired bowel prep reviewed with patient:Miralax, Dulcolax  Instructions reviewed with patient by:estelle  Clearances:  n/a

## 2023-12-27 ENCOUNTER — TELEPHONE (OUTPATIENT)
Dept: FAMILY MEDICINE CLINIC | Facility: CLINIC | Age: 73
End: 2023-12-27

## 2023-12-27 ENCOUNTER — OFFICE VISIT (OUTPATIENT)
Dept: FAMILY MEDICINE CLINIC | Facility: CLINIC | Age: 73
End: 2023-12-27
Payer: MEDICARE

## 2023-12-27 VITALS
SYSTOLIC BLOOD PRESSURE: 118 MMHG | OXYGEN SATURATION: 94 % | DIASTOLIC BLOOD PRESSURE: 72 MMHG | BODY MASS INDEX: 38.99 KG/M2 | WEIGHT: 234 LBS | HEART RATE: 73 BPM | HEIGHT: 65 IN

## 2023-12-27 DIAGNOSIS — M79.7 FIBROMYALGIA: ICD-10-CM

## 2023-12-27 DIAGNOSIS — Z12.11 COLON CANCER SCREENING: ICD-10-CM

## 2023-12-27 DIAGNOSIS — K22.70 BARRETT'S ESOPHAGUS WITHOUT DYSPLASIA: ICD-10-CM

## 2023-12-27 DIAGNOSIS — K21.9 GASTROESOPHAGEAL REFLUX DISEASE WITHOUT ESOPHAGITIS: ICD-10-CM

## 2023-12-27 DIAGNOSIS — K57.90 DIVERTICULOSIS: ICD-10-CM

## 2023-12-27 DIAGNOSIS — E66.01 OBESITY, MORBID (HCC): ICD-10-CM

## 2023-12-27 DIAGNOSIS — E03.9 ACQUIRED HYPOTHYROIDISM: ICD-10-CM

## 2023-12-27 DIAGNOSIS — R82.90 ABNORMAL FINDING ON URINALYSIS: ICD-10-CM

## 2023-12-27 DIAGNOSIS — F11.20 CONTINUOUS OPIOID DEPENDENCE (HCC): ICD-10-CM

## 2023-12-27 DIAGNOSIS — G89.4 CHRONIC PAIN SYNDROME: Primary | ICD-10-CM

## 2023-12-27 DIAGNOSIS — G40.909 SEIZURE DISORDER (HCC): ICD-10-CM

## 2023-12-27 DIAGNOSIS — T78.40XD ALLERGY, SUBSEQUENT ENCOUNTER: ICD-10-CM

## 2023-12-27 PROCEDURE — 99213 OFFICE O/P EST LOW 20 MIN: CPT | Performed by: INTERNAL MEDICINE

## 2023-12-27 RX ORDER — HYDROCODONE BITARTRATE AND ACETAMINOPHEN 5; 325 MG/1; MG/1
1 TABLET ORAL 2 TIMES DAILY PRN
Qty: 60 TABLET | Refills: 0 | Status: SHIPPED | OUTPATIENT
Start: 2023-12-27

## 2023-12-27 NOTE — ASSESSMENT & PLAN NOTE
Patient with chronic pain syndrome with low back pain currently taking Norco 1 tablet twice a day as needed will continue with the same without taking pain medication patient has difficulty to move around

## 2023-12-27 NOTE — ASSESSMENT & PLAN NOTE
Trying to taper it down but patient is having difficulty with day-to-day activities without taking the pain medication

## 2023-12-27 NOTE — PROGRESS NOTES
Name: Maryanne Marcos      : 1950      MRN: [id number]  Encounter Provider: Foreign Simeon MD  Encounter Date: 2023   Encounter department: [facility]    Assessment & Plan     1. Chronic pain syndrome  Assessment & Plan:  Patient with chronic pain syndrome with low back pain currently taking Norco 1 tablet twice a day as needed will continue with the same without taking pain medication patient has difficulty to move around      2. Abnormal finding on urinalysis    3. Acquired hypothyroidism  Assessment & Plan:  Continue levothyroxine 100 mcg daily follow-up with repeat lab later      4. Allergy, subsequent encounter  Assessment & Plan:  Patient is on Singulair and Flonase we will continue with the same      5. Carson's esophagus without dysplasia  Assessment & Plan:  Seen with a gastroenterologist scheduled for upper endoscopy currently she is taking pantoprazole 40 mg daily      6. Colon cancer screening  Assessment & Plan:  Scheduled for colonoscopy in       7. Continuous opioid dependence (HCC)  Assessment & Plan:  Trying to taper it down but patient is having difficulty with day-to-day activities without taking the pain medication      8. Diverticulosis    9. Fibromyalgia  Assessment & Plan:  Continue Cymbalta 30 mg daily continue      10. Gastroesophageal reflux disease without esophagitis  Assessment & Plan:  Continue with pantoprazole follow-up with EGD      11. Obesity, morbid (HCC)  Assessment & Plan:  BMI 38.94 emphasized regarding diet exercise losing weight      12. Seizure disorder (HCC)  Assessment & Plan:  Continue Keppra 500 mg twice a day             Subjective      Patient is coming here for a follow-up evaluation with regards to her symptoms of low back pain.  She was also recently seen by the gastroenterologist with regards to her Carson's esophagus and also diverticulosis she is scheduled to have upper and lower endoscopy done in the month of  February.  Reviewed the reports of the gastroenterologist.  Medications reviewed labs reviewed.      Review of Systems   Constitutional:  Negative for chills and fever.   HENT:  Negative for ear pain and sore throat.    Eyes:  Negative for pain and visual disturbance.   Respiratory:  Negative for cough and shortness of breath.    Cardiovascular:  Negative for chest pain and palpitations.   Gastrointestinal:  Negative for abdominal pain and vomiting.   Genitourinary:  Negative for dysuria and hematuria.   Musculoskeletal:  Positive for arthralgias and back pain.   Skin:  Negative for color change and rash.   Neurological:  Negative for seizures and syncope.   All other systems reviewed and are negative.      Current Outpatient Medications on File Prior to Visit   Medication Sig    albuterol (PROVENTIL HFA,VENTOLIN HFA) 90 mcg/act inhaler Inhale 2 puffs every 6 (six) hours as needed for wheezing    Cholecalciferol (Vitamin D3) 50 MCG (2000 UT) TABS Take 2,000 Units by mouth in the morning    DULoxetine (CYMBALTA) 30 mg delayed release capsule Take 1 capsule (30 mg total) by mouth every morning    fluticasone (FLONASE) 50 mcg/act nasal spray USE 2 SPRAYS IN EACH NOSTRIL EVERY DAY    HYDROcodone-acetaminophen (NORCO) 5-325 mg per tablet Take 1 tablet by mouth 2 (two) times a day as needed for pain Max Daily Amount: 2 tablets    levETIRAcetam (KEPPRA) 500 mg tablet Take 1 tablet (500 mg total) by mouth 2 (two) times a day    levothyroxine 100 mcg tablet Take 1 tablet (100 mcg total) by mouth every morning    Magnesium 400 MG TABS Take 1 tablet (400 mg total) by mouth in the morning    montelukast (SINGULAIR) 10 mg tablet Take 1 tablet (10 mg total) by mouth every morning    pantoprazole (PROTONIX) 40 mg tablet Take 1 tablet (40 mg total) by mouth daily    vitamin B-12 (VITAMIN B-12) 1,000 mcg tablet Take 1 tablet (1,000 mcg total) by mouth daily       Objective     /72 (BP Location: Left arm, Patient Position:  "Sitting, Cuff Size: Standard)   Pulse 73   Ht 5' 5\" (1.651 m)   Wt 106 kg (234 lb)   SpO2 94%   BMI 38.94 kg/m²     Physical Exam  Vitals and nursing note reviewed.   Constitutional:       Appearance: Normal appearance.   Cardiovascular:      Rate and Rhythm: Normal rate and regular rhythm.      Heart sounds: Normal heart sounds.   Pulmonary:      Effort: Pulmonary effort is normal.      Breath sounds: Normal breath sounds.   Abdominal:      Palpations: Abdomen is soft.   Musculoskeletal:      Cervical back: Normal range of motion and neck supple.      Right lower leg: No edema.      Left lower leg: No edema.      Comments: SLR causing pain discomfort low back area   Neurological:      Mental Status: She is alert and oriented to person, place, and time.   Psychiatric:         Mood and Affect: Mood normal.         Behavior: Behavior normal.       Foreign Simeon MD    "

## 2023-12-27 NOTE — ASSESSMENT & PLAN NOTE
Seen with a gastroenterologist scheduled for upper endoscopy currently she is taking pantoprazole 40 mg daily

## 2024-01-23 ENCOUNTER — ANESTHESIA (OUTPATIENT)
Dept: ANESTHESIOLOGY | Facility: HOSPITAL | Age: 74
End: 2024-01-23

## 2024-01-23 ENCOUNTER — ANESTHESIA EVENT (OUTPATIENT)
Dept: ANESTHESIOLOGY | Facility: HOSPITAL | Age: 74
End: 2024-01-23

## 2024-01-23 NOTE — PROGRESS NOTES
Office Visit Note  24     Maryanne Marcos 73 y.o. female MRN: [id number]  : 1950    Assessment:     1. Acquired hypothyroidism  Assessment & Plan:  Patient is currently taking levothyroxine 100 mcg daily we will continue with the same dose for now follow-up with repeat TSH level at a later date      2. Continuous opioid dependence (HCC)    3. Seizure disorder (HCC)    4. Allergy, subsequent encounter  Assessment & Plan:  Continue Singulair and Flonase nasal spray      5. Carson's esophagus without dysplasia  Assessment & Plan:  Patient had an EGD done on  continue with pantoprazole for now      6. Chronic pain syndrome  Assessment & Plan:  Patient with chronic pain syndrome currently taking Norco on appearing basis we will continue the same.    Orders:  -     HYDROcodone-acetaminophen (NORCO) 5-325 mg per tablet; Take 1 tablet by mouth 2 (two) times a day as needed for pain Max Daily Amount: 2 tablets    7. Fibromyalgia    8. Gastroesophageal reflux disease without esophagitis    9. Obesity (BMI 35.0-39.9 without comorbidity)  Assessment & Plan:  Patient's current BMI is 37.77 again emphasized regarding diet exercise lifestyle modification to lose weight.      10. Dermatitis  -     triamcinolone (KENALOG) 0.1 % cream; Apply topically 2 (two) times a day          Urinary Incontinence Plan of Care: counseling topics discussed: practice Kegel (pelvic floor strengthening) exercises and use restroom every 2 hours.           Discussion Summary and Plan:  Today's care plan and medications were reviewed with patient in detail and all their questions answered to their satisfaction.    Chief Complaint   Patient presents with    Follow-up      Subjective:  Patient is coming here for the follow-up evaluation with history of chronic pain syndrome on opioids, hypothyroidism, fibromyalgia, diverticulosis.  Medications reviewed labs reviewed patient continues to experience pain in the low back  area        The following portions of the patient's history were reviewed and updated as appropriate: allergies, current medications, past family history, past medical history, past social history, past surgical history and problem list.    Review of Systems   Constitutional:  Negative for chills and fever.   HENT:  Negative for ear pain and sore throat.    Eyes:  Negative for pain and visual disturbance.   Respiratory:  Negative for cough and shortness of breath.    Cardiovascular:  Negative for chest pain and palpitations.   Gastrointestinal:  Negative for abdominal pain and vomiting.   Genitourinary:  Negative for dysuria and hematuria.   Musculoskeletal:  Negative for arthralgias and back pain.   Skin:  Negative for color change and rash.   Neurological:  Negative for seizures and syncope.   All other systems reviewed and are negative.        Historical Information   Patient Active Problem List   Diagnosis    Chronic pain syndrome    Acquired hypothyroidism    Seizure disorder (HCC)    Gastroesophageal reflux disease without esophagitis    Allergies    Fibromyalgia    Migraines    Colon cancer screening    Continuous opioid dependence (HCC)    Abnormal finding on urinalysis    Carson's esophagus without dysplasia    Diverticulosis    Hx of colonic polyp    Obesity (BMI 35.0-39.9 without comorbidity)     Past Medical History:   Diagnosis Date    Anxiety with depression     Arm fracture, left     x2    Arthritis     Degenerative joint disease     Encounter for immunization 10/18/2022    Fibromyalgia     GERD (gastroesophageal reflux disease)     Hypertension     Hypothyroidism     Kidney stones     Low back pain     Seasonal allergies     Seizure disorder (HCC)      Past Surgical History:   Procedure Laterality Date    COLONOSCOPY      DILATION AND CURETTAGE OF UTERUS      NECK SURGERY      Plate    ORIF FOREARM FRACTURE Left     SHOULDER SURGERY      x2    TONSILLECTOMY       Social History     Substance and  Sexual Activity   Alcohol Use Yes    Comment: Socially/very rare     Social History     Substance and Sexual Activity   Drug Use Never     Social History     Tobacco Use   Smoking Status Former    Current packs/day: 0.00    Types: Cigarettes    Quit date:     Years since quittin.0    Passive exposure: Past   Smokeless Tobacco Never     Family History   Problem Relation Age of Onset    Stroke Mother     Heart disease Father      Health Maintenance Due   Topic    Hepatitis C Screening     Breast Cancer Screening: Mammogram     Osteoporosis Screening     Zoster Vaccine (1 of 2)    Pneumococcal Vaccine: 65+ Years (1 - PCV)    Colorectal Cancer Screening     COVID-19 Vaccine (3 - 2023-24 season)    Depression Screening       Meds/Allergies       Current Outpatient Medications:     albuterol (PROVENTIL HFA,VENTOLIN HFA) 90 mcg/act inhaler, Inhale 2 puffs every 6 (six) hours as needed for wheezing, Disp: 8 g, Rfl: 1    Cholecalciferol (Vitamin D3) 50 MCG (2000 UT) TABS, Take 2,000 Units by mouth in the morning, Disp: , Rfl:     DULoxetine (CYMBALTA) 30 mg delayed release capsule, Take 1 capsule (30 mg total) by mouth every morning, Disp: 90 capsule, Rfl: 1    fluticasone (FLONASE) 50 mcg/act nasal spray, USE 2 SPRAYS IN EACH NOSTRIL EVERY DAY, Disp: 32 g, Rfl: 3    HYDROcodone-acetaminophen (NORCO) 5-325 mg per tablet, Take 1 tablet by mouth 2 (two) times a day as needed for pain Max Daily Amount: 2 tablets, Disp: 60 tablet, Rfl: 0    levETIRAcetam (KEPPRA) 500 mg tablet, Take 1 tablet (500 mg total) by mouth 2 (two) times a day, Disp: 180 tablet, Rfl: 1    levothyroxine 100 mcg tablet, Take 1 tablet (100 mcg total) by mouth every morning, Disp: 90 tablet, Rfl: 1    Magnesium 400 MG TABS, Take 1 tablet (400 mg total) by mouth in the morning, Disp: 90 tablet, Rfl: 0    montelukast (SINGULAIR) 10 mg tablet, Take 1 tablet (10 mg total) by mouth every morning, Disp: 90 tablet, Rfl: 1    pantoprazole (PROTONIX) 40 mg  "tablet, Take 1 tablet (40 mg total) by mouth daily, Disp: 90 tablet, Rfl: 1    triamcinolone (KENALOG) 0.1 % cream, Apply topically 2 (two) times a day, Disp: 45 g, Rfl: 1    vitamin B-12 (VITAMIN B-12) 1,000 mcg tablet, Take 1 tablet (1,000 mcg total) by mouth daily, Disp: 90 tablet, Rfl: 1      Objective:    Vitals:   /76 (BP Location: Right arm, Patient Position: Sitting, Cuff Size: Large)   Pulse 90   Ht 5' 5\" (1.651 m)   Wt 103 kg (227 lb)   SpO2 96%   BMI 37.77 kg/m²   Body mass index is 37.77 kg/m².  Vitals:    01/25/24 1413   Weight: 103 kg (227 lb)       Physical Exam  Vitals and nursing note reviewed.   Constitutional:       Appearance: Normal appearance.   Cardiovascular:      Rate and Rhythm: Normal rate and regular rhythm.      Heart sounds: Normal heart sounds.   Pulmonary:      Effort: Pulmonary effort is normal.      Breath sounds: Normal breath sounds.   Abdominal:      General: Abdomen is flat.      Palpations: Abdomen is soft.   Musculoskeletal:      Cervical back: Normal range of motion and neck supple.      Right lower leg: No edema.      Left lower leg: No edema.   Neurological:      Mental Status: She is alert.   Psychiatric:         Mood and Affect: Mood normal.         Behavior: Behavior normal.         Lab Review   No visits with results within 2 Month(s) from this visit.   Latest known visit with results is:   Orders Only on 07/26/2023   Component Date Value Ref Range Status    Total Cholesterol 07/26/2023 184  <200 mg/dL Final    HDL 07/26/2023 62  > OR = 50 mg/dL Final    Triglycerides 07/26/2023 83  <150 mg/dL Final    LDL Calculated 07/26/2023 105 (H)  mg/dL (calc) Final    Comment: Reference range: <100     Desirable range <100 mg/dL for primary prevention;    <70 mg/dL for patients with CHD or diabetic patients   with > or = 2 CHD risk factors.     LDL-C is now calculated using the Rayray-Lehman   calculation, which is a validated novel method providing   better accuracy " than the Friedewald equation in the   estimation of LDL-C.   Rayray SS et al. ELINA. 2013;310(19): 4806-0650   (http://education.[employer].DaoliCloud/faq/GNQ036)      Chol HDLC Ratio 07/26/2023 3.0  <5.0 (calc) Final    Non-HDL Cholesterol 07/26/2023 122  <130 mg/dL (calc) Final    Comment: For patients with diabetes plus 1 major ASCVD risk   factor, treating to a non-HDL-C goal of <100 mg/dL   (LDL-C of <70 mg/dL) is considered a therapeutic   option.      Glucose, Random 07/26/2023 96  65 - 99 mg/dL Final    Comment:               Fasting reference interval         BUN 07/26/2023 15  7 - 25 mg/dL Final    Creatinine 07/26/2023 0.97  0.60 - 1.00 mg/dL Final    eGFR 07/26/2023 62  > OR = 60 mL/min/1.73m2 Final    Comment: The eGFR is based on the CKD-EPI 2021 equation. To calculate   the new eGFR from a previous Creatinine or Cystatin C  result, go to https://www.kidney.org/professionals/  kdoqi/gfr%5Fcalculator      SL AMB BUN/CREATININE RATIO 07/26/2023 NOT APPLICABLE  6 - 22 (calc) Final    Sodium 07/26/2023 142  135 - 146 mmol/L Final    Potassium 07/26/2023 4.3  3.5 - 5.3 mmol/L Final    Chloride 07/26/2023 103  98 - 110 mmol/L Final    CO2 07/26/2023 33 (H)  20 - 32 mmol/L Final    Calcium 07/26/2023 9.5  8.6 - 10.4 mg/dL Final    Protein, Total 07/26/2023 6.7  6.1 - 8.1 g/dL Final    Albumin 07/26/2023 3.9  3.6 - 5.1 g/dL Final    Globulin 07/26/2023 2.8  1.9 - 3.7 g/dL (calc) Final    Albumin/Globulin Ratio 07/26/2023 1.4  1.0 - 2.5 (calc) Final    TOTAL BILIRUBIN 07/26/2023 0.4  0.2 - 1.2 mg/dL Final    Alkaline Phosphatase 07/26/2023 65  37 - 153 U/L Final    AST 07/26/2023 17  10 - 35 U/L Final    ALT 07/26/2023 18  6 - 29 U/L Final    Color UA 07/26/2023 YELLOW  YELLOW Final    Urine Appearance 07/26/2023 CLEAR  CLEAR Final    Specific Gravity 07/26/2023 1.008  1.001 - 1.035 Final    Ph 07/26/2023 6.0  5.0 - 8.0 Final    Glucose, Urine 07/26/2023 NEGATIVE  NEGATIVE Final    Bilirubin, Urine 07/26/2023  NEGATIVE  NEGATIVE Final    Ketone, Urine 07/26/2023 NEGATIVE  NEGATIVE Final    Blood, Urine 07/26/2023 TRACE (A)  NEGATIVE Final    Protein, Urine 07/26/2023 NEGATIVE  NEGATIVE Final    Nitrites Urine 07/26/2023 NEGATIVE  NEGATIVE Final    Leukocyte Esterase 07/26/2023 2+ (A)  NEGATIVE Final    SL AMB WBC, URINE 07/26/2023 0-5  < OR = 5 /HPF Final    RBC, Urine 07/26/2023 NONE SEEN  < OR = 2 /HPF Final    Squamous Epithelial Cells 07/26/2023 6-10 (A)  < OR = 5 /HPF Final    Bacteria, UA 07/26/2023 NONE SEEN  NONE SEEN /HPF Final    Hyaline Casts 07/26/2023 NONE SEEN  NONE SEEN /LPF Final    Comment(s) 07/26/2023    Final    Comment: This urine was analyzed for the presence of WBC,   RBC, bacteria, casts, and other formed elements.   Only those elements seen were reported.            White Blood Cell Count 07/26/2023 5.9  3.8 - 10.8 Thousand/uL Final    Red Blood Cell Count 07/26/2023 4.52  3.80 - 5.10 Million/uL Final    Hemoglobin 07/26/2023 13.7  11.7 - 15.5 g/dL Final    HCT 07/26/2023 40.8  35.0 - 45.0 % Final    MCV 07/26/2023 90.3  80.0 - 100.0 fL Final    MCH 07/26/2023 30.3  27.0 - 33.0 pg Final    MCHC 07/26/2023 33.6  32.0 - 36.0 g/dL Final    RDW 07/26/2023 13.0  11.0 - 15.0 % Final    Platelet Count 07/26/2023 211  140 - 400 Thousand/uL Final    SL AMB MPV 07/26/2023 12.6 (H)  7.5 - 12.5 fL Final    Neutrophils (Absolute) 07/26/2023 3,245  1,500 - 7,800 cells/uL Final    Lymphocytes (Absolute) 07/26/2023 1,788  850 - 3,900 cells/uL Final    Monocytes (Absolute) 07/26/2023 696  200 - 950 cells/uL Final    Eosinophils (Absolute) 07/26/2023 153  15 - 500 cells/uL Final    Basophils ABS 07/26/2023 18  0 - 200 cells/uL Final    Neutrophils 07/26/2023 55  % Final    Lymphocytes 07/26/2023 30.3  % Final    Monocytes 07/26/2023 11.8  % Final    Eosinophils 07/26/2023 2.6  % Final    Basophils PCT 07/26/2023 0.3  % Final    TSH W/RFX TO FREE T4 07/26/2023 0.37 (L)  0.40 - 4.50 mIU/L Final    Hemoglobin A1C  "07/26/2023 5.4  <5.7 % of total Hgb Final    Comment: For the purpose of screening for the presence of  diabetes:     <5.7%       Consistent with the absence of diabetes  5.7-6.4%    Consistent with increased risk for diabetes              (prediabetes)  > or =6.5%  Consistent with diabetes     This assay result is consistent with a decreased risk  of diabetes.     Currently, no consensus exists regarding use of  hemoglobin A1c for diagnosis of diabetes in children.     According to American Diabetes Association (ADA)  guidelines, hemoglobin A1c <7.0% represents optimal  control in non-pregnant diabetic patients. Different  metrics may apply to specific patient populations.   Standards of Medical Care in Diabetes(ADA).          Urine Culture, Comprehensive 07/26/2023    Final    CULTURE INDICATED - RESULTS TO FOLLOW    Urine Culture Result 07/26/2023  (A)   Final    Comment:     CULTURE, URINE, ROUTINE         Micro Number:      [id number]    Test Status:       Final    Specimen Source:   Urine    Specimen Quality:  Adequate    Result:            10,000-49,000 CFU/mL of Enterococcus faecalis                              E.faecalis                            ----------------                            INT   SULMA     AMPICILLIN             S     <=2     NITROFURANTOIN         S     <=16     VANCOMYCIN             S     2    S=Susceptible  I=Intermediate  R=Resistant  * = Not Tested  NR = Not Reported  **NN = See Therapy Comments        Free t4 07/26/2023 1.5  0.8 - 1.8 ng/dL Final         Foreign Simeon MD        \"This note has been constructed using a voice recognition system.Therefore there may be syntax, spelling, and/or grammatical errors. Please call if you have any questions. \"  "

## 2024-01-25 ENCOUNTER — OFFICE VISIT (OUTPATIENT)
Dept: FAMILY MEDICINE CLINIC | Facility: CLINIC | Age: 74
End: 2024-01-25
Payer: MEDICARE

## 2024-01-25 VITALS
DIASTOLIC BLOOD PRESSURE: 76 MMHG | SYSTOLIC BLOOD PRESSURE: 122 MMHG | HEART RATE: 90 BPM | HEIGHT: 65 IN | BODY MASS INDEX: 37.82 KG/M2 | WEIGHT: 227 LBS | OXYGEN SATURATION: 96 %

## 2024-01-25 DIAGNOSIS — L30.9 DERMATITIS: ICD-10-CM

## 2024-01-25 DIAGNOSIS — M79.7 FIBROMYALGIA: ICD-10-CM

## 2024-01-25 DIAGNOSIS — E66.9 OBESITY (BMI 35.0-39.9 WITHOUT COMORBIDITY): ICD-10-CM

## 2024-01-25 DIAGNOSIS — G89.4 CHRONIC PAIN SYNDROME: ICD-10-CM

## 2024-01-25 DIAGNOSIS — K22.70 BARRETT'S ESOPHAGUS WITHOUT DYSPLASIA: ICD-10-CM

## 2024-01-25 DIAGNOSIS — K21.9 GASTROESOPHAGEAL REFLUX DISEASE WITHOUT ESOPHAGITIS: ICD-10-CM

## 2024-01-25 DIAGNOSIS — T78.40XD ALLERGY, SUBSEQUENT ENCOUNTER: ICD-10-CM

## 2024-01-25 DIAGNOSIS — F11.20 CONTINUOUS OPIOID DEPENDENCE (HCC): ICD-10-CM

## 2024-01-25 DIAGNOSIS — E03.9 ACQUIRED HYPOTHYROIDISM: Primary | ICD-10-CM

## 2024-01-25 DIAGNOSIS — G40.909 SEIZURE DISORDER (HCC): ICD-10-CM

## 2024-01-25 PROCEDURE — 99213 OFFICE O/P EST LOW 20 MIN: CPT | Performed by: INTERNAL MEDICINE

## 2024-01-25 RX ORDER — HYDROCODONE BITARTRATE AND ACETAMINOPHEN 5; 325 MG/1; MG/1
1 TABLET ORAL 2 TIMES DAILY PRN
Qty: 60 TABLET | Refills: 0 | Status: SHIPPED | OUTPATIENT
Start: 2024-01-25

## 2024-01-25 RX ORDER — TRIAMCINOLONE ACETONIDE 1 MG/G
CREAM TOPICAL 2 TIMES DAILY
Qty: 45 G | Refills: 1 | Status: SHIPPED | OUTPATIENT
Start: 2024-01-25

## 2024-01-25 NOTE — ASSESSMENT & PLAN NOTE
Patient with chronic pain syndrome currently taking Norco on appearing basis we will continue the same.

## 2024-01-25 NOTE — ASSESSMENT & PLAN NOTE
Patient is currently taking levothyroxine 100 mcg daily we will continue with the same dose for now follow-up with repeat TSH level at a later date

## 2024-01-25 NOTE — ASSESSMENT & PLAN NOTE
Patient's current BMI is 37.77 again emphasized regarding diet exercise lifestyle modification to lose weight.

## 2024-01-25 NOTE — ASSESSMENT & PLAN NOTE
Patient gets migraine headaches 2-3 times in a month and an average she takes an Advil as needed.  I will

## 2024-01-29 ENCOUNTER — TELEPHONE (OUTPATIENT)
Age: 74
End: 2024-01-29

## 2024-01-29 NOTE — TELEPHONE ENCOUNTER
Patient contacted office for procedure directions. Miralax/dulcolax procedure instructions sent via [employer] as per pt request.

## 2024-01-31 ENCOUNTER — TELEPHONE (OUTPATIENT)
Age: 74
End: 2024-01-31

## 2024-01-31 NOTE — TELEPHONE ENCOUNTER
Patients GI provider:  Dr. Sawyer    Number to return call: [phone]    Reason for call: Pt calling asking if provider can band of internal hemorrhoids while having her colonoscopy. Pt notified they don't usually do that however we will send message to confirm.    Scheduled procedure/appointment date if applicable: procedure 2/6/24

## 2024-02-01 NOTE — TELEPHONE ENCOUNTER
Called and spoke to pt and informed that hemorrhoids can not be banded at the time of the procedure, however, if banding needs to be done that will be determined at time of procedure and will then be scheduled at a separate appointment.  Pt understood.

## 2024-02-06 ENCOUNTER — ANESTHESIA (OUTPATIENT)
Dept: GASTROENTEROLOGY | Facility: AMBULARY SURGERY CENTER | Age: 74
End: 2024-02-06

## 2024-02-06 ENCOUNTER — HOSPITAL ENCOUNTER (OUTPATIENT)
Dept: GASTROENTEROLOGY | Facility: AMBULARY SURGERY CENTER | Age: 74
Setting detail: OUTPATIENT SURGERY
Discharge: HOME | End: 2024-02-06
Attending: INTERNAL MEDICINE
Payer: MEDICARE

## 2024-02-06 ENCOUNTER — ANESTHESIA EVENT (OUTPATIENT)
Dept: GASTROENTEROLOGY | Facility: AMBULARY SURGERY CENTER | Age: 74
End: 2024-02-06

## 2024-02-06 VITALS
RESPIRATION RATE: 18 BRPM | DIASTOLIC BLOOD PRESSURE: 68 MMHG | TEMPERATURE: 97.5 F | SYSTOLIC BLOOD PRESSURE: 134 MMHG | HEART RATE: 60 BPM | OXYGEN SATURATION: 98 %

## 2024-02-06 DIAGNOSIS — K22.70 BARRETT'S ESOPHAGUS WITHOUT DYSPLASIA: ICD-10-CM

## 2024-02-06 DIAGNOSIS — Z86.010 HX OF COLONIC POLYP: ICD-10-CM

## 2024-02-06 DIAGNOSIS — K21.9 HIATAL HERNIA WITH GERD: ICD-10-CM

## 2024-02-06 DIAGNOSIS — K57.90 DIVERTICULOSIS: ICD-10-CM

## 2024-02-06 DIAGNOSIS — K44.9 HIATAL HERNIA WITH GERD: ICD-10-CM

## 2024-02-06 PROCEDURE — 88360 TUMOR IMMUNOHISTOCHEM/MANUAL: CPT | Performed by: PATHOLOGY

## 2024-02-06 PROCEDURE — 88342 IMHCHEM/IMCYTCHM 1ST ANTB: CPT | Performed by: PATHOLOGY

## 2024-02-06 PROCEDURE — 45385 COLONOSCOPY W/LESION REMOVAL: CPT | Performed by: INTERNAL MEDICINE

## 2024-02-06 PROCEDURE — 45380 COLONOSCOPY AND BIOPSY: CPT | Performed by: INTERNAL MEDICINE

## 2024-02-06 PROCEDURE — 88305 TISSUE EXAM BY PATHOLOGIST: CPT | Performed by: PATHOLOGY

## 2024-02-06 PROCEDURE — 88341 IMHCHEM/IMCYTCHM EA ADD ANTB: CPT | Performed by: PATHOLOGY

## 2024-02-06 PROCEDURE — 43239 EGD BIOPSY SINGLE/MULTIPLE: CPT | Performed by: INTERNAL MEDICINE

## 2024-02-06 RX ORDER — PROPOFOL 10 MG/ML
INJECTION, EMULSION INTRAVENOUS AS NEEDED
Status: DISCONTINUED | OUTPATIENT
Start: 2024-02-06 | End: 2024-02-06

## 2024-02-06 RX ORDER — ONDANSETRON 2 MG/ML
4 INJECTION INTRAMUSCULAR; INTRAVENOUS ONCE AS NEEDED
Status: CANCELLED | OUTPATIENT
Start: 2024-02-06

## 2024-02-06 RX ORDER — LIDOCAINE HYDROCHLORIDE 20 MG/ML
INJECTION, SOLUTION EPIDURAL; INFILTRATION; INTRACAUDAL; PERINEURAL AS NEEDED
Status: DISCONTINUED | OUTPATIENT
Start: 2024-02-06 | End: 2024-02-06

## 2024-02-06 RX ORDER — SODIUM CHLORIDE, SODIUM LACTATE, POTASSIUM CHLORIDE, CALCIUM CHLORIDE 600; 310; 30; 20 MG/100ML; MG/100ML; MG/100ML; MG/100ML
125 INJECTION, SOLUTION INTRAVENOUS CONTINUOUS
Status: DISCONTINUED | OUTPATIENT
Start: 2024-02-06 | End: 2024-02-10 | Stop reason: HOSPADM

## 2024-02-06 RX ADMIN — PROPOFOL 80 MG: 10 INJECTION, EMULSION INTRAVENOUS at 11:48

## 2024-02-06 RX ADMIN — PROPOFOL 20 MG: 10 INJECTION, EMULSION INTRAVENOUS at 11:55

## 2024-02-06 RX ADMIN — PROPOFOL 30 MG: 10 INJECTION, EMULSION INTRAVENOUS at 11:59

## 2024-02-06 RX ADMIN — SODIUM CHLORIDE, SODIUM LACTATE, POTASSIUM CHLORIDE, AND CALCIUM CHLORIDE: .6; .31; .03; .02 INJECTION, SOLUTION INTRAVENOUS at 11:22

## 2024-02-06 RX ADMIN — LIDOCAINE HYDROCHLORIDE 100 MG: 20 INJECTION, SOLUTION EPIDURAL; INFILTRATION; INTRACAUDAL; PERINEURAL at 11:48

## 2024-02-06 RX ADMIN — PROPOFOL 20 MG: 10 INJECTION, EMULSION INTRAVENOUS at 11:54

## 2024-02-06 RX ADMIN — PROPOFOL 20 MG: 10 INJECTION, EMULSION INTRAVENOUS at 11:49

## 2024-02-06 RX ADMIN — PROPOFOL 30 MG: 10 INJECTION, EMULSION INTRAVENOUS at 12:04

## 2024-02-06 RX ADMIN — PROPOFOL 20 MG: 10 INJECTION, EMULSION INTRAVENOUS at 11:50

## 2024-02-06 RX ADMIN — PROPOFOL 20 MG: 10 INJECTION, EMULSION INTRAVENOUS at 11:56

## 2024-02-06 RX ADMIN — PROPOFOL 20 MG: 10 INJECTION, EMULSION INTRAVENOUS at 11:53

## 2024-02-06 RX ADMIN — PROPOFOL 20 MG: 10 INJECTION, EMULSION INTRAVENOUS at 11:52

## 2024-02-06 NOTE — ANESTHESIA PREPROCEDURE EVALUATION
Procedure:  COLONOSCOPY  EGD    Relevant Problems   ANESTHESIA (within normal limits)      CARDIO (within normal limits)   (+) Migraines      ENDO   (+) Acquired hypothyroidism      GI/HEPATIC   (+) Gastroesophageal reflux disease without esophagitis      MUSCULOSKELETAL   (+) Fibromyalgia      NEURO/PSYCH   (+) Chronic pain syndrome   (+) Continuous opioid dependence (HCC)   (+) Fibromyalgia   (+) Migraines   (+) Seizure disorder (HCC)        Physical Exam    Airway    Mallampati score: II  TM Distance: >3 FB  Neck ROM: full     Dental   No notable dental hx     Cardiovascular  Rhythm: regular, Rate: normal    Pulmonary   Breath sounds clear to auscultation    Other Findings  post-pubertal.      Anesthesia Plan  ASA Score- 2     Anesthesia Type- IV sedation with anesthesia with ASA Monitors.         Additional Monitors:     Airway Plan:            Plan Factors-Exercise tolerance (METS): >4 METS.    Chart reviewed.   Existing labs reviewed. Patient summary reviewed.    Patient is not a current smoker.              Induction-     Postoperative Plan-     Informed Consent- Anesthetic plan and risks discussed with patient.  I personally reviewed this patient with the CRNA. Discussed and agreed on the Anesthesia Plan with the CRNA..

## 2024-02-06 NOTE — H&P
History and Physical - [facility]  Maryanne Marcos 73 y.o. female MRN: [id number]                  HPI: Maryanne Marcos is a 73 y.o. year old female who presents for history of GERD and polyp      REVIEW OF SYSTEMS: Per the HPI, and otherwise unremarkable.    Historical Information   Past Medical History:   Diagnosis Date    Anxiety with depression     Arm fracture, left     x2    Arthritis     Degenerative joint disease     Encounter for immunization 10/18/2022    Fibromyalgia     GERD (gastroesophageal reflux disease)     Hypertension     Hypothyroidism     Kidney stones     Low back pain     Seasonal allergies     Seizure disorder (HCC)      Past Surgical History:   Procedure Laterality Date    COLONOSCOPY      DILATION AND CURETTAGE OF UTERUS      NECK SURGERY      Plate    ORIF FOREARM FRACTURE Left     SHOULDER SURGERY      x2    TONSILLECTOMY       Social History   Social History     Substance and Sexual Activity   Alcohol Use Yes    Comment: Socially/very rare     Social History     Substance and Sexual Activity   Drug Use Never     Social History     Tobacco Use   Smoking Status Former    Current packs/day: 0.00    Types: Cigarettes    Quit date:     Years since quittin.1    Passive exposure: Past   Smokeless Tobacco Never     Family History   Problem Relation Age of Onset    Stroke Mother     Heart disease Father        Meds/Allergies       Current Outpatient Medications:     Cholecalciferol (Vitamin D3) 50 MCG (2000) TABS    DULoxetine (CYMBALTA) 30 mg delayed release capsule    fluticasone (FLONASE) 50 mcg/act nasal spray    HYDROcodone-acetaminophen (NORCO) 5-325 mg per tablet    levETIRAcetam (KEPPRA) 500 mg tablet    levothyroxine 100 mcg tablet    Magnesium 400 MG TABS    montelukast (SINGULAIR) 10 mg tablet    pantoprazole (PROTONIX) 40 mg tablet    vitamin B-12 (VITAMIN B-12) 1,000 mcg tablet    albuterol (PROVENTIL HFA,VENTOLIN HFA) 90 mcg/act inhaler    triamcinolone  (KENALOG) 0.1 % cream    Current Facility-Administered Medications:     lactated ringers infusion, 125 mL/hr, Intravenous, Continuous, Continue from Pre-op at 02/06/24 1144    Allergies   Allergen Reactions    Beta Adrenergic Blockers Shortness Of Breath    Sulfamethoxazole-Trimethoprim Shortness Of Breath    Meloxicam Confusion       Objective     /76   Pulse 72   Temp 97.5 °F (36.4 °C) (Temporal)   Resp 18   SpO2 95%       PHYSICAL EXAM    Gen: NAD  Head: NCAT  CV: RRR  CHEST: Clear  ABD: soft, NT/ND  EXT: no edema      ASSESSMENT/PLAN:  This is a 73 y.o. year old female here for EGD colonoscopy, and she is stable and optimized for her procedure.

## 2024-02-06 NOTE — ANESTHESIA POSTPROCEDURE EVALUATION
Post-Op Assessment Note    CV Status:  Stable  Pain Score: 0    Pain management: adequate       Mental Status:  Alert and awake   Hydration Status:  Stable   PONV Controlled:  None   Airway Patency:  Patent and adequate     Post Op Vitals Reviewed: Yes    No anethesia notable event occurred.    Staff: CRNA               /70 (02/06/24 1215)    Temp      Pulse 67 (02/06/24 1215)   Resp 22 (02/06/24 1215)    SpO2 97 % (02/06/24 1215)

## 2024-02-13 PROCEDURE — 88305 TISSUE EXAM BY PATHOLOGIST: CPT | Performed by: PATHOLOGY

## 2024-02-13 PROCEDURE — 88360 TUMOR IMMUNOHISTOCHEM/MANUAL: CPT | Performed by: PATHOLOGY

## 2024-02-13 PROCEDURE — 88341 IMHCHEM/IMCYTCHM EA ADD ANTB: CPT | Performed by: PATHOLOGY

## 2024-02-13 PROCEDURE — 88342 IMHCHEM/IMCYTCHM 1ST ANTB: CPT | Performed by: PATHOLOGY

## 2024-02-14 NOTE — RESULT ENCOUNTER NOTE
Please call the patient regarding her abnormal result. Has polyp, Follow up colonscopy in 5 years  EGD biopsy consistent with Carson's esophagus.  Repeat EGD in 3 years.  Follow up in my office as needed

## 2024-02-15 ENCOUNTER — NURSE TRIAGE (OUTPATIENT)
Age: 74
End: 2024-02-15

## 2024-02-16 PROBLEM — Z12.11 COLON CANCER SCREENING: Status: RESOLVED | Noted: 2022-10-18 | Resolved: 2024-02-16

## 2024-02-20 ENCOUNTER — RA CDI HCC (OUTPATIENT)
Dept: OTHER | Facility: HOSPITAL | Age: 74
End: 2024-02-20

## 2024-02-26 NOTE — PROGRESS NOTES
Office Visit Note  24     Maryanne Marcos 73 y.o. female MRN: [id number]  : 1950    Assessment:     1. 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  No history of any injury movement of the joint not causing any discomfort at this time but deformity is noted with some redness on the top will refer the patient to the hand surgeon.  Will also get an x-ray    Orders:  -     XR hand 3+ vw right; Future; Expected date: 2024  -     Ambulatory referral to Orthopedic Surgery; Future    2. Chronic pain syndrome  Assessment & Plan:  Patient is on Norco for chronic pain syndrome we will continue the same    Orders:  -     HYDROcodone-acetaminophen (NORCO) 5-325 mg per tablet; Take 1 tablet by mouth 2 (two) times a day as needed for pain Max Daily Amount: 2 tablets    3. Acquired hypothyroidism  Assessment & Plan:  Continue levothyroxine 100 mcg daily      4. Allergy, subsequent encounter  Assessment & Plan:  Patient is currently taking Singulair once daily and Flonase nasal spray we will continue the same      5. Continuous opioid dependence (HCC)  Assessment & Plan:  Patient is able to do her day-to-day activities without taking the pain medication      6. Fibromyalgia  Assessment & Plan:  Patient is currently taking Cymbalta along with the pain medication we will continue the same      7. Gastroesophageal reflux disease without esophagitis  Assessment & Plan:  Patient is on pantoprazole EGD reports noted continue same      8. Migraine without status migrainosus, not intractable, unspecified migraine type  Assessment & Plan:  Occasional migraine headaches takes Advil      9. Obesity (BMI 35.0-39.9 without comorbidity)  Assessment & Plan:  BMI is slightly less at 37.04 emphasized regarding diet exercise lifestyle modification to lose weight      10. Seizure disorder (HCC)  Assessment & Plan:  Patient is on  Keppra 500 mg twice a day continue                 Discussion Summary and Plan:  Today's care plan and medications were reviewed with patient in detail and all their questions answered to their satisfaction.    Chief Complaint   Patient presents with    Follow-up      Subjective:  Patient is coming here for a follow-up evaluation with regards to symptoms of low back pain chronic pain syndrome currently taking pain medication without taking it he has difficulty to do her day-to-day activities.  Recently had a colonoscopy done and was having internal hemorrhoids at times they bleed she is planning to have rubber band.  Patient has deformity of the right ring finger and the proximal interphalangeal joint with some redness.  Patient does not experience any pain as such even with the movements of the interphalangeal joint.  But when patient hits by accident the right ring finger she gets increasing pain.        The following portions of the patient's history were reviewed and updated as appropriate: allergies, current medications, past family history, past medical history, past social history, past surgical history and problem list.    Review of Systems   Constitutional:  Negative for chills and fever.   HENT:  Negative for ear pain and sore throat.    Eyes:  Negative for pain and visual disturbance.   Respiratory:  Negative for cough and shortness of breath.    Cardiovascular:  Negative for chest pain and palpitations.   Gastrointestinal:  Negative for abdominal pain and vomiting.   Genitourinary:  Negative for dysuria and hematuria.   Musculoskeletal:  Positive for arthralgias and back pain.   Skin:  Negative for color change and rash.   Neurological:  Negative for seizures and syncope.   All other systems reviewed and are negative.        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     Past Medical History:   Diagnosis Date    Anxiety with depression     Arm fracture, left     x2    Arthritis     Degenerative joint disease     Encounter for immunization 10/18/2022    Fibromyalgia     GERD (gastroesophageal reflux disease)     Hypertension     Hypothyroidism     Kidney stones     Low back pain     Seasonal allergies     Seizure disorder (HCC)      Past Surgical History:   Procedure Laterality Date    COLONOSCOPY      DILATION AND CURETTAGE OF UTERUS      NECK SURGERY      Plate    ORIF FOREARM FRACTURE Left     SHOULDER SURGERY      x2    TONSILLECTOMY       Social History     Substance and Sexual Activity   Alcohol Use Yes    Comment: Socially/very rare     Social History     Substance and Sexual Activity   Drug Use Never     Social History     Tobacco Use   Smoking Status Former    Current packs/day: 0.00    Types: Cigarettes    Quit date:     Years since quittin.1    Passive exposure: Past   Smokeless Tobacco Never     Family History   Problem Relation Age of Onset    Stroke Mother     Heart disease Father      Health Maintenance Due   Topic    Hepatitis C Screening     Breast Cancer Screening: Mammogram     Osteoporosis Screening     Zoster Vaccine (1 of 2)    Pneumococcal Vaccine: 65+ Years (1 of 1 - PCV)    COVID-19 Vaccine (3 - 2023- season)    Fall Risk     Depression Screening     Medicare Annual Wellness Visit (AWV)       Meds/Allergies       Current Outpatient Medications:     albuterol (PROVENTIL HFA,VENTOLIN HFA) 90 mcg/act inhaler, Inhale 2 puffs every 6 (six) hours as needed for wheezing, Disp: 8 g, Rfl: 1    Cholecalciferol (Vitamin D3) 50 MCG (2000) TABS,  "Take 2,000 Units by mouth in the morning, Disp: , Rfl:     DULoxetine (CYMBALTA) 30 mg delayed release capsule, Take 1 capsule (30 mg total) by mouth every morning, Disp: 90 capsule, Rfl: 1    fluticasone (FLONASE) 50 mcg/act nasal spray, USE 2 SPRAYS IN EACH NOSTRIL EVERY DAY, Disp: 32 g, Rfl: 3    HYDROcodone-acetaminophen (NORCO) 5-325 mg per tablet, Take 1 tablet by mouth 2 (two) times a day as needed for pain Max Daily Amount: 2 tablets, Disp: 60 tablet, Rfl: 0    levETIRAcetam (KEPPRA) 500 mg tablet, Take 1 tablet (500 mg total) by mouth 2 (two) times a day, Disp: 180 tablet, Rfl: 1    levothyroxine 100 mcg tablet, Take 1 tablet (100 mcg total) by mouth every morning, Disp: 90 tablet, Rfl: 1    Magnesium 400 MG TABS, Take 1 tablet (400 mg total) by mouth in the morning, Disp: 90 tablet, Rfl: 0    montelukast (SINGULAIR) 10 mg tablet, Take 1 tablet (10 mg total) by mouth every morning, Disp: 90 tablet, Rfl: 1    pantoprazole (PROTONIX) 40 mg tablet, Take 1 tablet (40 mg total) by mouth daily, Disp: 90 tablet, Rfl: 1    triamcinolone (KENALOG) 0.1 % cream, Apply topically 2 (two) times a day, Disp: 45 g, Rfl: 1    vitamin B-12 (VITAMIN B-12) 1,000 mcg tablet, Take 1 tablet (1,000 mcg total) by mouth daily, Disp: 90 tablet, Rfl: 1      Objective:    Vitals:   /70 (BP Location: Right arm, Patient Position: Sitting, Cuff Size: Standard)   Pulse 77   Ht 5' 5\" (1.651 m)   Wt 101 kg (222 lb 9.6 oz)   SpO2 95%   BMI 37.04 kg/m²   Body mass index is 37.04 kg/m².  Vitals:    02/27/24 1119   Weight: 101 kg (222 lb 9.6 oz)       Physical Exam  Vitals and nursing note reviewed.   Constitutional:       Appearance: Normal appearance.   Cardiovascular:      Rate and Rhythm: Normal rate and regular rhythm.      Heart sounds: Normal heart sounds.   Pulmonary:      Effort: Pulmonary effort is normal.      Breath sounds: Normal breath sounds.   Abdominal:      Palpations: Abdomen is soft.   Musculoskeletal:      " Cervical back: Normal range of motion and neck supple.      Right lower leg: No edema.      Left lower leg: No edema.      Comments: Deformity of the right ring finger interphalangeal joint movements not causing much discomfort but there is some redness noted.   Skin:     General: Skin is warm and dry.   Neurological:      Mental Status: She is alert and oriented to person, place, and time.   Psychiatric:         Mood and Affect: Mood normal.         Behavior: Behavior normal.         Lab Review   Hospital Outpatient Visit on 02/06/2024   Component Date Value Ref Range Status    Case Report 02/06/2024    Final                    Value:Surgical Pathology Report                         Case: [id number]                                  Authorizing Provider:  Matthew Sawyer MD           Collected:           02/06/2024 1153              Ordering Location:     [facility]   Received:            02/06/2024 1354                                     Center                                                                       Pathologist:           Kayla Guerrero MD                                                       Specimens:   A) - Esophagus, lower esophagus bx @ 34cm hx of barretts                                            B) - Esophagus, esophagus bx @ 32cm hx of barretts                                                  C) - Large Intestine, Left/Descending Colon, descending colon polyp cold snare                      D) - Large Intestine, Sigmoid Colon, sigmoid polyp                                         Final Diagnosis 02/06/2024    Final                    Value:This result contains rich text formatting which cannot be displayed here.    Additional Information 02/06/2024    Final                    Value:This result contains rich text formatting which cannot be displayed here.    Synoptic Checklist 02/06/2024    Final                    Value:                            COLON/RECTUM POLYP FORM -  "GI - D, C                                                                                     :    Other                                                         :    HP      Gross Description 02/06/2024    Final                    Value:This result contains rich text formatting which cannot be displayed here.    Clinical Information 02/06/2024    Final                    Value:eg junction is at 35         Foreign Simeon MD        \"This note has been constructed using a voice recognition system.Therefore there may be syntax, spelling, and/or grammatical errors. Please call if you have any questions. \"  "

## 2024-02-27 ENCOUNTER — OFFICE VISIT (OUTPATIENT)
Dept: FAMILY MEDICINE CLINIC | Facility: CLINIC | Age: 74
End: 2024-02-27
Payer: MEDICARE

## 2024-02-27 VITALS
OXYGEN SATURATION: 95 % | HEART RATE: 77 BPM | DIASTOLIC BLOOD PRESSURE: 70 MMHG | HEIGHT: 65 IN | BODY MASS INDEX: 37.09 KG/M2 | SYSTOLIC BLOOD PRESSURE: 122 MMHG | WEIGHT: 222.6 LBS

## 2024-02-27 DIAGNOSIS — K21.9 GASTROESOPHAGEAL REFLUX DISEASE WITHOUT ESOPHAGITIS: ICD-10-CM

## 2024-02-27 DIAGNOSIS — M79.7 FIBROMYALGIA: ICD-10-CM

## 2024-02-27 DIAGNOSIS — G89.4 CHRONIC PAIN SYNDROME: ICD-10-CM

## 2024-02-27 DIAGNOSIS — M20.011 ACQUIRED MALLET DEFORMITY OF RIGHT RING FINGER: Primary | ICD-10-CM

## 2024-02-27 DIAGNOSIS — E66.9 OBESITY (BMI 35.0-39.9 WITHOUT COMORBIDITY): ICD-10-CM

## 2024-02-27 DIAGNOSIS — G40.909 SEIZURE DISORDER (HCC): ICD-10-CM

## 2024-02-27 DIAGNOSIS — G43.909 MIGRAINE WITHOUT STATUS MIGRAINOSUS, NOT INTRACTABLE, UNSPECIFIED MIGRAINE TYPE: ICD-10-CM

## 2024-02-27 DIAGNOSIS — E03.9 ACQUIRED HYPOTHYROIDISM: ICD-10-CM

## 2024-02-27 DIAGNOSIS — F11.20 CONTINUOUS OPIOID DEPENDENCE (HCC): ICD-10-CM

## 2024-02-27 DIAGNOSIS — T78.40XD ALLERGY, SUBSEQUENT ENCOUNTER: ICD-10-CM

## 2024-02-27 PROBLEM — E66.01 MORBID OBESITY (HCC): Status: ACTIVE | Noted: 2024-02-27

## 2024-02-27 PROCEDURE — 99214 OFFICE O/P EST MOD 30 MIN: CPT | Performed by: INTERNAL MEDICINE

## 2024-02-27 RX ORDER — HYDROCODONE BITARTRATE AND ACETAMINOPHEN 5; 325 MG/1; MG/1
1 TABLET ORAL 2 TIMES DAILY PRN
Qty: 60 TABLET | Refills: 0 | Status: SHIPPED | OUTPATIENT
Start: 2024-02-27

## 2024-02-27 NOTE — ASSESSMENT & PLAN NOTE
BMI is slightly less at 37.04 emphasized regarding diet exercise lifestyle modification to lose weight

## 2024-02-27 NOTE — ASSESSMENT & PLAN NOTE
Patient with deformity of the interphalangeal joint of right ring finger.  No history of any injury movement of the joint not causing any discomfort at this time but deformity is noted with some redness on the top will refer the patient to the hand surgeon.  Will also get an x-ray

## 2024-02-27 NOTE — ASSESSMENT & PLAN NOTE
Patient is currently taking Singulair once daily and Flonase nasal spray we will continue the same

## 2024-03-11 ENCOUNTER — TELEPHONE (OUTPATIENT)
Age: 74
End: 2024-03-11

## 2024-03-11 NOTE — TELEPHONE ENCOUNTER
Patients GI provider:  Dr. Sawyer    Number to return call: [phone]    Reason for call: Pt calling to reschedule her FU on 03.25, she prefers to have her appt's with DR Sawyer. He told her to come in for a banding so she would like to just see him for that and booked his first available.     Scheduled procedure/appointment date if applicable: 06.07.24

## 2024-03-13 ENCOUNTER — APPOINTMENT (OUTPATIENT)
Dept: RADIOLOGY | Facility: CLINIC | Age: 74
End: 2024-03-13
Payer: MEDICARE

## 2024-03-13 DIAGNOSIS — M20.011 ACQUIRED MALLET DEFORMITY OF RIGHT RING FINGER: ICD-10-CM

## 2024-03-13 PROCEDURE — 73130 X-RAY EXAM OF HAND: CPT

## 2024-03-26 ENCOUNTER — OFFICE VISIT (OUTPATIENT)
Dept: FAMILY MEDICINE CLINIC | Facility: CLINIC | Age: 74
End: 2024-03-26
Payer: MEDICARE

## 2024-03-26 VITALS
BODY MASS INDEX: 37.44 KG/M2 | OXYGEN SATURATION: 94 % | SYSTOLIC BLOOD PRESSURE: 122 MMHG | WEIGHT: 225 LBS | HEART RATE: 86 BPM | DIASTOLIC BLOOD PRESSURE: 68 MMHG

## 2024-03-26 DIAGNOSIS — E66.9 OBESITY (BMI 35.0-39.9 WITHOUT COMORBIDITY): ICD-10-CM

## 2024-03-26 DIAGNOSIS — G40.909 SEIZURE DISORDER (HCC): ICD-10-CM

## 2024-03-26 DIAGNOSIS — K22.70 BARRETT'S ESOPHAGUS WITHOUT DYSPLASIA: ICD-10-CM

## 2024-03-26 DIAGNOSIS — E03.9 ACQUIRED HYPOTHYROIDISM: Primary | ICD-10-CM

## 2024-03-26 DIAGNOSIS — M20.011 ACQUIRED MALLET DEFORMITY OF RIGHT RING FINGER: ICD-10-CM

## 2024-03-26 DIAGNOSIS — M79.7 FIBROMYALGIA: ICD-10-CM

## 2024-03-26 DIAGNOSIS — G89.4 CHRONIC PAIN SYNDROME: ICD-10-CM

## 2024-03-26 DIAGNOSIS — G43.909 MIGRAINE WITHOUT STATUS MIGRAINOSUS, NOT INTRACTABLE, UNSPECIFIED MIGRAINE TYPE: ICD-10-CM

## 2024-03-26 DIAGNOSIS — T78.40XD ALLERGY, SUBSEQUENT ENCOUNTER: ICD-10-CM

## 2024-03-26 PROCEDURE — 99213 OFFICE O/P EST LOW 20 MIN: CPT | Performed by: INTERNAL MEDICINE

## 2024-03-26 PROCEDURE — G2211 COMPLEX E/M VISIT ADD ON: HCPCS | Performed by: INTERNAL MEDICINE

## 2024-03-26 RX ORDER — HYDROCODONE BITARTRATE AND ACETAMINOPHEN 5; 325 MG/1; MG/1
1 TABLET ORAL 2 TIMES DAILY PRN
Qty: 60 TABLET | Refills: 0 | Status: SHIPPED | OUTPATIENT
Start: 2024-03-26

## 2024-03-26 NOTE — ASSESSMENT & PLAN NOTE
X-ray of the right hand did not reveal any significant findings patient has still some deformity of the interphalangeal joint of the right ring finger will monitor for now movements are not restricted

## 2024-03-26 NOTE — PROGRESS NOTES
Office Visit Note  24     Maryanne Marcos 73 y.o. female MRN: [id number]  : 1950    Assessment:     1. Acquired hypothyroidism  Assessment & Plan:  Continue levothyroxine 100 mcg daily follow-up with the TSH level later      2. Chronic pain syndrome  Assessment & Plan:  Patient with chronic pain syndrome without the pain medication patient has difficulty with day-to-day activities.  PDMP reviewed.  Will renew the pain medication.      3. 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 Carson's esophagus without dysplasia    5. Allergy, subsequent encounter  Assessment & Plan:  Continue Singulair and Flonase      6. Obesity (BMI 35.0-39.9 without comorbidity)    7. Seizure disorder (HCC)    8. Fibromyalgia    9. Migraine without status migrainosus, not intractable, unspecified migraine type               Discussion Summary and Plan:  Today's care plan and medications were reviewed with patient in detail and all their questions answered to their satisfaction.    Chief Complaint   Patient presents with    Follow-up      Subjective:  Patient is coming here for the follow-up evaluation regarding her low back pain chronic in nature for which she takes the pain medication without that she has difficulty with day-to-day activities.  Currently the pain is severe.  Patient also with multiple other medical problems.  Medications reviewed labs reviewed        The following portions of the patient's history were reviewed and updated as appropriate: allergies, current medications, past family history, past medical history, past social history, past surgical history and problem list.    Review of Systems   Constitutional:  Negative for chills and fever.   HENT:  Negative for ear pain and  sore throat.    Eyes:  Negative for pain and visual disturbance.   Respiratory:  Negative for cough and shortness of breath.    Cardiovascular:  Negative for chest pain and palpitations.   Gastrointestinal:  Negative for abdominal pain and vomiting.   Genitourinary:  Negative for dysuria and hematuria.   Musculoskeletal:  Positive for back pain. Negative for arthralgias.   Skin:  Negative for color change and rash.   Neurological:  Negative for seizures and syncope.   All other systems reviewed and are negative.        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     Past Medical History:   Diagnosis Date    Anxiety with depression     Arm fracture, left     x2    Arthritis     Degenerative joint disease     Encounter for immunization 10/18/2022    Fibromyalgia     GERD (gastroesophageal reflux disease)     Hypertension     Hypothyroidism     Kidney stones     Low back pain     Seasonal allergies     Seizure disorder (HCC)      Past Surgical History:   Procedure Laterality Date    COLONOSCOPY      DILATION AND CURETTAGE OF UTERUS      NECK SURGERY      Plate    ORIF FOREARM FRACTURE Left     SHOULDER SURGERY      x2    TONSILLECTOMY       Social History     Substance and Sexual Activity   Alcohol Use Yes    Comment: Socially/very rare     Social History     Substance and Sexual Activity   Drug Use Never     Social History     Tobacco Use   Smoking Status Former    Current packs/day: 0.00    Types: Cigarettes    Quit date:     Years since quittin.2    Passive exposure:  Past   Smokeless Tobacco Never     Family History   Problem Relation Age of Onset    Stroke Mother     Heart disease Father      Health Maintenance Due   Topic    Hepatitis C Screening     Osteoporosis Screening     Zoster Vaccine (1 of 2)    Pneumococcal Vaccine: 65+ Years (1 of 1 - PCV)    COVID-19 Vaccine (3 - 2023-24 season)    Fall Risk     Depression Screening     Medicare Annual Wellness Visit (AWV)       Meds/Allergies       Current Outpatient Medications:     albuterol (PROVENTIL HFA,VENTOLIN HFA) 90 mcg/act inhaler, Inhale 2 puffs every 6 (six) hours as needed for wheezing, Disp: 8 g, Rfl: 1    Cholecalciferol (Vitamin D3) 50 MCG (2000 UT) TABS, Take 2,000 Units by mouth in the morning, Disp: , Rfl:     DULoxetine (CYMBALTA) 30 mg delayed release capsule, Take 1 capsule (30 mg total) by mouth every morning, Disp: 90 capsule, Rfl: 1    fluticasone (FLONASE) 50 mcg/act nasal spray, USE 2 SPRAYS IN EACH NOSTRIL EVERY DAY, Disp: 32 g, Rfl: 3    HYDROcodone-acetaminophen (NORCO) 5-325 mg per tablet, Take 1 tablet by mouth 2 (two) times a day as needed for pain Max Daily Amount: 2 tablets, Disp: 60 tablet, Rfl: 0    levETIRAcetam (KEPPRA) 500 mg tablet, Take 1 tablet (500 mg total) by mouth 2 (two) times a day, Disp: 180 tablet, Rfl: 1    levothyroxine 100 mcg tablet, Take 1 tablet (100 mcg total) by mouth every morning, Disp: 90 tablet, Rfl: 1    Magnesium 400 MG TABS, Take 1 tablet (400 mg total) by mouth in the morning, Disp: 90 tablet, Rfl: 0    montelukast (SINGULAIR) 10 mg tablet, Take 1 tablet (10 mg total) by mouth every morning, Disp: 90 tablet, Rfl: 1    pantoprazole (PROTONIX) 40 mg tablet, Take 1 tablet (40 mg total) by mouth daily, Disp: 90 tablet, Rfl: 1    triamcinolone (KENALOG) 0.1 % cream, Apply topically 2 (two) times a day, Disp: 45 g, Rfl: 1    vitamin B-12 (VITAMIN B-12) 1,000 mcg tablet, Take 1 tablet (1,000 mcg total) by mouth daily, Disp: 90 tablet, Rfl: 1      Objective:    Vitals:   BP  122/68 (BP Location: Right arm, Patient Position: Sitting, Cuff Size: Standard)   Pulse 86   Wt 102 kg (225 lb)   SpO2 94%   BMI 37.44 kg/m²   Body mass index is 37.44 kg/m².  Vitals:    03/26/24 0946   Weight: 102 kg (225 lb)       Physical Exam  Vitals and nursing note reviewed.   Constitutional:       Appearance: Normal appearance.   Cardiovascular:      Rate and Rhythm: Normal rate and regular rhythm.      Heart sounds: Normal heart sounds.   Pulmonary:      Effort: Pulmonary effort is normal.      Breath sounds: Normal breath sounds.   Abdominal:      Palpations: Abdomen is soft.   Musculoskeletal:      Cervical back: Normal range of motion and neck supple.      Right lower leg: No edema.      Left lower leg: No edema.      Comments: Spine movements causing discomfort and pain   Skin:     General: Skin is warm and dry.   Neurological:      Mental Status: She is alert and oriented to person, place, and time.   Psychiatric:         Mood and Affect: Mood normal.         Behavior: Behavior normal.         Lab Review   Hospital Outpatient Visit on 02/06/2024   Component Date Value Ref Range Status    Case Report 02/06/2024    Final                    Value:Surgical Pathology Report                         Case: [id number]                                  Authorizing Provider:  Matthew Sawyer MD           Collected:           02/06/2024 1153              Ordering Location:     [facility]   Received:            02/06/2024 1354                                     Center                                                                       Pathologist:           Kayla Guerrero MD                                                       Specimens:   A) - Esophagus, lower esophagus bx @ 34cm hx of barretts                                            B) - Esophagus, esophagus bx @ 32cm hx of barretts                                                  C) - Large Intestine, Left/Descending Colon, descending  "colon polyp cold snare                      D) - Large Intestine, Sigmoid Colon, sigmoid polyp                                         Final Diagnosis 02/06/2024    Final                    Value:This result contains rich text formatting which cannot be displayed here.    Additional Information 02/06/2024    Final                    Value:This result contains rich text formatting which cannot be displayed here.    Synoptic Checklist 02/06/2024    Final                    Value:                            COLON/RECTUM POLYP FORM - GI - D, C                                                                                     :    Other                                                         :    HP      Gross Description 02/06/2024    Final                    Value:This result contains rich text formatting which cannot be displayed here.    Clinical Information 02/06/2024    Final                    Value:eg junction is at 35         Foreign Simeon MD        \"This note has been constructed using a voice recognition system.Therefore there may be syntax, spelling, and/or grammatical errors. Please call if you have any questions. \"  "

## 2024-03-26 NOTE — ASSESSMENT & PLAN NOTE
Patient with chronic pain syndrome without the pain medication patient has difficulty with day-to-day activities.  PDMP reviewed.  Will renew the pain medication.

## 2024-04-02 ENCOUNTER — OFFICE VISIT (OUTPATIENT)
Dept: OBGYN CLINIC | Facility: CLINIC | Age: 74
End: 2024-04-02
Payer: MEDICARE

## 2024-04-02 VITALS
SYSTOLIC BLOOD PRESSURE: 119 MMHG | BODY MASS INDEX: 37.49 KG/M2 | WEIGHT: 225 LBS | HEART RATE: 71 BPM | DIASTOLIC BLOOD PRESSURE: 80 MMHG | HEIGHT: 65 IN

## 2024-04-02 DIAGNOSIS — M20.011 ACQUIRED MALLET DEFORMITY OF RIGHT RING FINGER: ICD-10-CM

## 2024-04-02 PROCEDURE — 99203 OFFICE O/P NEW LOW 30 MIN: CPT | Performed by: STUDENT IN AN ORGANIZED HEALTH CARE EDUCATION/TRAINING PROGRAM

## 2024-04-02 NOTE — PROGRESS NOTES
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  Diagnosis, treatment options and associated risks were discussed with the patient including no treatment, nonsurgical treatment and potential for surgical intervention.  The patient was given the opportunity to ask questions regarding each.  Patient's injury is from December 2023, we discussed at this point there is no need for surgical intervention.   Discussed signs of swan neck deformity to look for.     Follow Up:  As needed    To Do Next Visit:  Repeat evaluation     General Discussions:     Mallet Finger: The anatomy and physiology of a mallet finger was discussed with the patient today.  Typically, the extensor tendon is torn off of the dorsal aspect of the distal phalanx.  This results in flexion of the distal interphalangeal joint, with incomplete extension.  Typically, and less the joint is subluxed, this is treated through conservative measures.  An extension block splint is worn over the distal interphalangeal joint for 6 weeks continuously, followed by 6 weeks of nocturnal use.  After healing, there is typically a small flexion deformity at the distal interphalangeal joint.  Surgery does not typically change these results.      _____________________________________________________  CHIEF COMPLAINT:  Chief Complaint   Patient presents with   • Right Hand - Pain         SUBJECTIVE:  Maryanne Marcos is a 73 y.o. female who presents with concern for right ring finger mallet finger. Patient states she cut her finger directly over the DIP joint in December which is when she started noticing the extensor tendon lag. She did not seek medical attention at time of injury. She did splint her finger for a few weeks but notes no improvements of extension lag. Pain is controlled. Denies numbness or paresthesias.       PAST MEDICAL HISTORY:  Past Medical History:    Diagnosis Date   • Anxiety with depression    • Arm fracture, left     x2   • Arthritis    • Degenerative joint disease    • Disease of thyroid gland    • Encounter for immunization 10/18/2022   • Fibromyalgia    • GERD (gastroesophageal reflux disease)    • Hypertension    • Hypothyroidism    • Kidney stones    • Low back pain    • Neurological disorder     Migraine   • Rheumatoid arthritis (HCC)    • Seasonal allergies    • Seizure disorder (HCC)        PAST SURGICAL HISTORY:  Past Surgical History:   Procedure Laterality Date   • COLONOSCOPY     • DILATION AND CURETTAGE OF UTERUS     • NECK SURGERY      Plate   • ORIF FOREARM FRACTURE Left    • SHOULDER SURGERY      x2   • TONSILLECTOMY         FAMILY HISTORY:  Family History   Problem Relation Age of Onset   • Stroke Mother    • Heart disease Father        SOCIAL HISTORY:  Social History     Tobacco Use   • Smoking status: Former     Current packs/day: 0.00     Average packs/day: 1 pack/day for 35.0 years (35.0 ttl pk-yrs)     Types: Cigarettes     Quit date:      Years since quittin.2     Passive exposure: Past   • Smokeless tobacco: Never   Vaping Use   • Vaping status: Never Used   Substance Use Topics   • Alcohol use: Not Currently     Comment: glass of wine with dinner out, 1x a week stopped    • Drug use: Never       MEDICATIONS:    Current Outpatient Medications:   •  albuterol (PROVENTIL HFA,VENTOLIN HFA) 90 mcg/act inhaler, Inhale 2 puffs every 6 (six) hours as needed for wheezing, Disp: 8 g, Rfl: 1  •  Cholecalciferol (Vitamin D3) 50 MCG (2000 UT) TABS, Take 2,000 Units by mouth in the morning, Disp: , Rfl:   •  DULoxetine (CYMBALTA) 30 mg delayed release capsule, Take 1 capsule (30 mg total) by mouth every morning, Disp: 90 capsule, Rfl: 1  •  fluticasone (FLONASE) 50 mcg/act nasal spray, USE 2 SPRAYS IN EACH NOSTRIL EVERY DAY, Disp: 32 g, Rfl: 3  •  HYDROcodone-acetaminophen (NORCO) 5-325 mg per tablet, Take 1 tablet by  "mouth 2 (two) times a day as needed for pain Max Daily Amount: 2 tablets, Disp: 60 tablet, Rfl: 0  •  levETIRAcetam (KEPPRA) 500 mg tablet, Take 1 tablet (500 mg total) by mouth 2 (two) times a day, Disp: 180 tablet, Rfl: 1  •  levothyroxine 100 mcg tablet, Take 1 tablet (100 mcg total) by mouth every morning, Disp: 90 tablet, Rfl: 1  •  Magnesium 400 MG TABS, Take 1 tablet (400 mg total) by mouth in the morning, Disp: 90 tablet, Rfl: 0  •  montelukast (SINGULAIR) 10 mg tablet, Take 1 tablet (10 mg total) by mouth every morning, Disp: 90 tablet, Rfl: 1  •  pantoprazole (PROTONIX) 40 mg tablet, Take 1 tablet (40 mg total) by mouth daily, Disp: 90 tablet, Rfl: 1  •  triamcinolone (KENALOG) 0.1 % cream, Apply topically 2 (two) times a day, Disp: 45 g, Rfl: 1  •  vitamin B-12 (VITAMIN B-12) 1,000 mcg tablet, Take 1 tablet (1,000 mcg total) by mouth daily, Disp: 90 tablet, Rfl: 1    ALLERGIES:  Allergies   Allergen Reactions   • Beta Adrenergic Blockers Shortness Of Breath   • Sulfamethoxazole-Trimethoprim Shortness Of Breath   • Meloxicam Confusion       REVIEW OF SYSTEMS:  Pertinent items are noted in HPI.  A comprehensive review of systems was negative.    LABS:  HgA1c:   Lab Results   Component Value Date    HGBA1C 5.4 07/26/2023     BMP:   Lab Results   Component Value Date    CALCIUM 9.5 07/26/2023    K 4.3 07/26/2023    CO2 33 (H) 07/26/2023     07/26/2023    BUN 15 07/26/2023    CREATININE 0.97 07/26/2023         _____________________________________________________  PHYSICAL EXAMINATION:  Vital signs: /80   Pulse 71   Ht 5' 5\" (1.651 m)   Wt 102 kg (225 lb)   BMI 37.44 kg/m²   General: well developed and well nourished, alert, oriented times 3, and appears comfortable  Psychiatric: Normal  HEENT: Trachea Midline, No torticollis  Cardiovascular: No discernable arrhythmia  Pulmonary: No wheezing or stridor  Abdomen: No rebound or guarding  Extremities: No peripheral edema  Skin: No masses, " erythema, lacerations, fluctation, ulcerations  Neurovascular: Sensation Intact to the Median, Ulnar, Radial Nerve, Motor Intact to the Median, Ulnar, Radial Nerve, and Pulses Intact    MUSCULOSKELETAL EXAMINATION:    right Hand -    Ring finger mallet deformity, 20 degree extension lag, full flexion   Skin is warm and dry to touch with no signs of erythema, ecchymosis, or infection  No soft tissue swelling or effusion noted  Full FDS, FDP  intact  No rotational deformity with composite finger flexion  Demonstrates normal wrist, elbow, and shoulder motion  Forearm compartments are soft and supple  2+ distal radial pulse with brisk capillary refill to the fingers  Radial, median, and ulnar motor and sensory distribution intact  Sensations light to touch intact distally      _____________________________________________________  STUDIES REVIEWED:  Images were reviewed in PACS by Dr. Martin and demonstrate: x-rays of the right hand obtained 3/13/2024 demonstrate no acute osseous abnormalities.       PROCEDURES PERFORMED:  Procedures  No Procedures performed today    Scribe Attestation    I,:   am acting as a scribe while in the presence of the attending physician.:       I,:   personally performed the services described in this documentation    as scribed in my presence.:

## 2024-04-19 ENCOUNTER — RA CDI HCC (OUTPATIENT)
Dept: OTHER | Facility: HOSPITAL | Age: 74
End: 2024-04-19

## 2024-04-19 NOTE — PROGRESS NOTES
E66.01 severe obesity    HCC coding opportunities          Chart Reviewed number of suggestions sent to Provider: 1     Patients Insurance     Medicare Insurance: Medicare

## 2024-04-24 NOTE — PROGRESS NOTES
Name: Maryanne Marcos      : 1950      MRN: [id number]  Encounter Provider: Foreign Simeon MD  Encounter Date: 2024   Encounter department: [facility]    Assessment & Plan     1. Nail deformity  Assessment & Plan:  Patient had deformed toenails big toes both sides and they have come off.  Some redness noted but no evidence of any infection.  Currently applying Band-Aid will refer the patient to the podiatrist for further evaluation and treatment    Orders:  -     Ambulatory referral to Podiatry; Future    2. Chronic pain syndrome  Assessment & Plan:  Patient continues with chronic pain in the low back area radiating also to both lower extremities PDMP reviewed will renew her pain medication    Orders:  -     HYDROcodone-acetaminophen (NORCO) 5-325 mg per tablet; Take 1 tablet by mouth 2 (two) times a day as needed for pain Max Daily Amount: 2 tablets    3. Seizure disorder (HCC)  Assessment & Plan:  Continue Keppra 500 mg twice a day no episodes of seizures    Orders:  -     levETIRAcetam (KEPPRA) 500 mg tablet; Take 1 tablet (500 mg total) by mouth 2 (two) times a day    4. Allergy, subsequent encounter  Assessment & Plan:  Patient is on Singulair and Flonase will continue    Orders:  -     fluticasone (FLONASE) 50 mcg/act nasal spray; 2 sprays into each nostril daily  -     montelukast (SINGULAIR) 10 mg tablet; Take 1 tablet (10 mg total) by mouth every morning    5. Acquired hypothyroidism  Assessment & Plan:  Patient with hypothyroidism on levothyroxine 100 mcg daily we will continue with the same dose follow-up with repeat TSH level later    Orders:  -     levothyroxine 100 mcg tablet; Take 1 tablet (100 mcg total) by mouth every morning    6. Anxiety  -     DULoxetine (CYMBALTA) 30 mg delayed release capsule; Take 1 capsule (30 mg total) by mouth every morning    7. Hypomagnesemia  -     Magnesium 400 MG TABS; Take 1 tablet (400 mg total) by mouth in the morning    8.  Gastroesophageal reflux disease without esophagitis  -     pantoprazole (PROTONIX) 40 mg tablet; Take 1 tablet (40 mg total) by mouth daily    9. B12 deficiency    10. 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 Continuous opioid dependence (HCC)  Assessment & Plan:  Patient needs to be on pain medication for her to do day-to-day activities discussed with patient regarding dependence on the medication      12. Fibromyalgia  Assessment & Plan:  Continue Cymbalta      13. Migraine without status migrainosus, not intractable, unspecified migraine type  Assessment & Plan:  Occasional migraine headaches she is still getting and she takes Advil      14. Obesity (BMI 35.0-39.9 without comorbidity)  Assessment & Plan:  Continue with lifestyle modification diet exercise lose weight      15. Carson's esophagus without dysplasia  Assessment & Plan:  Continue pantoprazole 40 mg daily             Subjective      Patient is coming for a follow-up evaluation with history of chronic pain syndrome with low back pain on hydrocodone twice a day as needed.  Patient also noticed recently the toenails on the big toes both sides deformed and came off.  Patient was also seen by the orthopedics regarding mallet finger recommended conservative management at this time.  Orthopedic note appreciated.    Medication reviewed labs reviewed.  Will refer the patient to the podiatrist medication renewed      Review of Systems   Constitutional:  Negative for chills and fever.   HENT:  Negative for ear pain and sore throat.    Eyes:  Negative for pain and visual disturbance.   Respiratory:  Negative for cough and shortness of breath.    Cardiovascular:  Negative for chest pain and palpitations.   Gastrointestinal:  Negative for abdominal pain and vomiting.   Genitourinary:  " Negative for dysuria and hematuria.   Musculoskeletal:  Negative for arthralgias and back pain.   Skin:  Negative for color change and rash.   Neurological:  Negative for seizures and syncope.   All other systems reviewed and are negative.      Current Outpatient Medications on File Prior to Visit   Medication Sig   • albuterol (PROVENTIL HFA,VENTOLIN HFA) 90 mcg/act inhaler Inhale 2 puffs every 6 (six) hours as needed for wheezing   • Cholecalciferol (Vitamin D3) 50 MCG (2000 UT) TABS Take 2,000 Units by mouth in the morning   • triamcinolone (KENALOG) 0.1 % cream Apply topically 2 (two) times a day   • vitamin B-12 (VITAMIN B-12) 1,000 mcg tablet Take 1 tablet (1,000 mcg total) by mouth daily   • [DISCONTINUED] DULoxetine (CYMBALTA) 30 mg delayed release capsule Take 1 capsule (30 mg total) by mouth every morning   • [DISCONTINUED] fluticasone (FLONASE) 50 mcg/act nasal spray USE 2 SPRAYS IN EACH NOSTRIL EVERY DAY   • [DISCONTINUED] HYDROcodone-acetaminophen (NORCO) 5-325 mg per tablet Take 1 tablet by mouth 2 (two) times a day as needed for pain Max Daily Amount: 2 tablets   • [DISCONTINUED] levETIRAcetam (KEPPRA) 500 mg tablet Take 1 tablet (500 mg total) by mouth 2 (two) times a day   • [DISCONTINUED] levothyroxine 100 mcg tablet Take 1 tablet (100 mcg total) by mouth every morning   • [DISCONTINUED] Magnesium 400 MG TABS Take 1 tablet (400 mg total) by mouth in the morning   • [DISCONTINUED] montelukast (SINGULAIR) 10 mg tablet Take 1 tablet (10 mg total) by mouth every morning   • [DISCONTINUED] pantoprazole (PROTONIX) 40 mg tablet Take 1 tablet (40 mg total) by mouth daily       Objective     /76 (BP Location: Right arm, Patient Position: Sitting, Cuff Size: Standard)   Pulse 96   Ht 5' 5\" (1.651 m)   Wt 102 kg (225 lb 6.4 oz)   SpO2 94%   BMI 37.51 kg/m²     Physical Exam  Vitals and nursing note reviewed.   Constitutional:       Appearance: Normal appearance.   Cardiovascular:      Rate and " Rhythm: Normal rate and regular rhythm.      Heart sounds: Normal heart sounds.   Pulmonary:      Effort: Pulmonary effort is normal.      Breath sounds: Normal breath sounds.   Abdominal:      Palpations: Abdomen is soft.   Musculoskeletal:      Cervical back: Normal range of motion and neck supple.      Right lower leg: No edema.      Left lower leg: No edema.   Skin:     General: Skin is warm and dry.      Comments: Nails on both big toes have come off   Neurological:      Mental Status: She is alert and oriented to person, place, and time.   Psychiatric:         Mood and Affect: Mood normal.         Behavior: Behavior normal.     No results found for this or any previous visit (from the past 1344 hour(s)).   Foreign Simeon MD

## 2024-04-26 ENCOUNTER — OFFICE VISIT (OUTPATIENT)
Dept: FAMILY MEDICINE CLINIC | Facility: CLINIC | Age: 74
End: 2024-04-26
Payer: MEDICARE

## 2024-04-26 VITALS
BODY MASS INDEX: 37.55 KG/M2 | HEART RATE: 96 BPM | SYSTOLIC BLOOD PRESSURE: 118 MMHG | HEIGHT: 65 IN | DIASTOLIC BLOOD PRESSURE: 76 MMHG | WEIGHT: 225.4 LBS | OXYGEN SATURATION: 94 %

## 2024-04-26 DIAGNOSIS — M20.011 ACQUIRED MALLET DEFORMITY OF RIGHT RING FINGER: ICD-10-CM

## 2024-04-26 DIAGNOSIS — K22.70 BARRETT'S ESOPHAGUS WITHOUT DYSPLASIA: ICD-10-CM

## 2024-04-26 DIAGNOSIS — M79.7 FIBROMYALGIA: ICD-10-CM

## 2024-04-26 DIAGNOSIS — E03.9 ACQUIRED HYPOTHYROIDISM: ICD-10-CM

## 2024-04-26 DIAGNOSIS — T78.40XD ALLERGY, SUBSEQUENT ENCOUNTER: ICD-10-CM

## 2024-04-26 DIAGNOSIS — G40.909 SEIZURE DISORDER (HCC): ICD-10-CM

## 2024-04-26 DIAGNOSIS — E66.9 OBESITY (BMI 35.0-39.9 WITHOUT COMORBIDITY): ICD-10-CM

## 2024-04-26 DIAGNOSIS — G43.909 MIGRAINE WITHOUT STATUS MIGRAINOSUS, NOT INTRACTABLE, UNSPECIFIED MIGRAINE TYPE: ICD-10-CM

## 2024-04-26 DIAGNOSIS — F41.9 ANXIETY: ICD-10-CM

## 2024-04-26 DIAGNOSIS — L60.8 NAIL DEFORMITY: Primary | ICD-10-CM

## 2024-04-26 DIAGNOSIS — G89.4 CHRONIC PAIN SYNDROME: ICD-10-CM

## 2024-04-26 DIAGNOSIS — K21.9 GASTROESOPHAGEAL REFLUX DISEASE WITHOUT ESOPHAGITIS: ICD-10-CM

## 2024-04-26 DIAGNOSIS — E53.8 B12 DEFICIENCY: ICD-10-CM

## 2024-04-26 DIAGNOSIS — F11.20 CONTINUOUS OPIOID DEPENDENCE (HCC): ICD-10-CM

## 2024-04-26 DIAGNOSIS — E83.42 HYPOMAGNESEMIA: ICD-10-CM

## 2024-04-26 PROCEDURE — 99214 OFFICE O/P EST MOD 30 MIN: CPT | Performed by: INTERNAL MEDICINE

## 2024-04-26 PROCEDURE — G2211 COMPLEX E/M VISIT ADD ON: HCPCS | Performed by: INTERNAL MEDICINE

## 2024-04-26 RX ORDER — PANTOPRAZOLE SODIUM 40 MG/1
40 TABLET, DELAYED RELEASE ORAL DAILY
Qty: 90 TABLET | Refills: 1 | Status: SHIPPED | OUTPATIENT
Start: 2024-04-26

## 2024-04-26 RX ORDER — HYDROCODONE BITARTRATE AND ACETAMINOPHEN 5; 325 MG/1; MG/1
1 TABLET ORAL 2 TIMES DAILY PRN
Qty: 60 TABLET | Refills: 0 | Status: SHIPPED | OUTPATIENT
Start: 2024-04-26

## 2024-04-26 RX ORDER — FLUTICASONE PROPIONATE 50 MCG
2 SPRAY, SUSPENSION (ML) NASAL DAILY
Qty: 32 G | Refills: 3 | Status: SHIPPED | OUTPATIENT
Start: 2024-04-26

## 2024-04-26 RX ORDER — LEVETIRACETAM 500 MG/1
500 TABLET ORAL 2 TIMES DAILY
Qty: 180 TABLET | Refills: 1 | Status: SHIPPED | OUTPATIENT
Start: 2024-04-26

## 2024-04-26 RX ORDER — MONTELUKAST SODIUM 10 MG/1
10 TABLET ORAL EVERY MORNING
Qty: 90 TABLET | Refills: 1 | Status: SHIPPED | OUTPATIENT
Start: 2024-04-26

## 2024-04-26 RX ORDER — CALCIUM CARBONATE 300MG(750)
400 TABLET,CHEWABLE ORAL DAILY
Qty: 90 TABLET | Refills: 0 | Status: SHIPPED | OUTPATIENT
Start: 2024-04-26

## 2024-04-26 RX ORDER — DULOXETIN HYDROCHLORIDE 30 MG/1
30 CAPSULE, DELAYED RELEASE ORAL EVERY MORNING
Qty: 90 CAPSULE | Refills: 1 | Status: SHIPPED | OUTPATIENT
Start: 2024-04-26

## 2024-04-26 RX ORDER — LEVOTHYROXINE SODIUM 0.1 MG/1
100 TABLET ORAL EVERY MORNING
Qty: 90 TABLET | Refills: 1 | Status: SHIPPED | OUTPATIENT
Start: 2024-04-26

## 2024-04-26 NOTE — ASSESSMENT & PLAN NOTE
Orthopedic note appreciated no intervention is recommended at this time continue to monitor patient still has the deformity in the right ring finger

## 2024-04-26 NOTE — ASSESSMENT & PLAN NOTE
Patient with hypothyroidism on levothyroxine 100 mcg daily we will continue with the same dose follow-up with repeat TSH level later

## 2024-04-26 NOTE — ASSESSMENT & PLAN NOTE
Patient continues with chronic pain in the low back area radiating also to both lower extremities PDMP reviewed will renew her pain medication

## 2024-04-26 NOTE — ASSESSMENT & PLAN NOTE
Patient needs to be on pain medication for her to do day-to-day activities discussed with patient regarding dependence on the medication

## 2024-04-26 NOTE — ASSESSMENT & PLAN NOTE
Patient had deformed toenails big toes both sides and they have come off.  Some redness noted but no evidence of any infection.  Currently applying Band-Aid will refer the patient to the podiatrist for further evaluation and treatment

## 2024-05-23 NOTE — PROGRESS NOTES
Ambulatory Visit  Name: Maryanne Marcos      : 1950      MRN: [id number]  Encounter Provider: Foreign Simeon MD  Encounter Date: 2024   Encounter department: [facility]IMARY [facility] [place]    Assessment & Plan   1. Chronic pain syndrome  Assessment & Plan:  Patient with chronic pain syndrome the low back area currently taking hydrocodone for the pain when she does not take it she is having difficulty with doing her day-to-day activities PDMP reviewed pain medication renewed  Orders:  -     HYDROcodone-acetaminophen (NORCO) 5-325 mg per tablet; Take 1 tablet by mouth 2 (two) times a day as needed for pain Max Daily Amount: 2 tablets  2. Acquired hypothyroidism  Assessment & Plan:  Currently patient taking levothyroxine 100 mcg daily we will continue with the same and follow-up with the TSH level which I have ordered  Orders:  -     TSH, 3rd generation with Free T4 reflex; Future; Expected date: 2024  3. Allergy, subsequent encounter  Assessment & Plan:  Patient is on Singulair and Flonase nasal spray we will continue  4. Carson's esophagus without dysplasia  Assessment & Plan:  Continue pantoprazole 40 mg daily  5. Continuous opioid dependence (HCC)  Assessment & Plan:  Continue with hydrocodone since without taking the medication patient not able to function.  6. Fibromyalgia  Assessment & Plan:  Patient is on Cymbalta 30 mg daily continue  7. Obesity (BMI 35.0-39.9 without comorbidity)  Assessment & Plan:  BMI is 37.77 went up slightly recommend very strongly strict diet exercise lifestyle modification to lose weight.  8. Gastroesophageal reflux disease without esophagitis  Assessment & Plan:  Continue pantoprazole  9. Migraine without status migrainosus, not intractable, unspecified migraine type  Assessment & Plan:  Occasional migraine headaches better compared to before takes Advil as needed  10. Abnormal finding on urinalysis  Assessment & Plan:  Patient is going for repeat urine analysis  we will follow-up with the same  11. Nail deformity  Assessment & Plan:  Patient has an appointment with the podiatrist regarding the toenail deformity  12. Seizure disorder (HCC)  Assessment & Plan:  Patient is on Keppra 5 mg twice a day stable  13. Medicare annual wellness visit, subsequent  14. Screening for deficiency anemia  -     CBC and differential; Future  15. Pre-diabetes  -     Comprehensive metabolic panel; Future  -     Hemoglobin A1C; Future; Expected date: 05/24/2024  -     UA w Reflex to Microscopic w Reflex to Culture; Future; Expected date: 05/24/2024  16. Dyslipidemia  -     Lipid panel; Future      Depression Screening and Follow-up Plan: Patient was screened for depression during today's encounter. They screened negative with a PHQ-2 score of 0.    Urinary Incontinence Plan of Care: counseling topics discussed: practice Kegel (pelvic floor strengthening) exercises, use restroom every 2 hours and keeping a bladder diary. Patient with a long history of urinary incontinence started in the 30s she wears a pad recommend patient try the Kegel exercises.  Patient had been seen in the past by the urologist and the urogynecologist also..       Preventive health issues were discussed with patient, and age appropriate screening tests were ordered as noted in patient's After Visit Summary. Personalized health advice and appropriate referrals for health education or preventive services given if needed, as noted in patient's After Visit Summary.    History of Present Illness     Patient is coming here for a follow-up evaluation with a history of chronic pain syndrome low back pain she also has other medical problems including Carson's esophagus GERD seizure disorder history of migraine headaches and hypothyroidism.  Medications reviewed labs reviewed discussed with patient at length regarding pain management.  No chest pain palpitation shortness of breath.       Patient Care Team:  Foreign Simeon MD as PCP  - General (Internal Medicine)    Review of Systems   Constitutional:  Negative for chills and fever.   HENT:  Negative for ear pain and sore throat.    Eyes:  Negative for pain and visual disturbance.   Respiratory:  Negative for cough and shortness of breath.    Cardiovascular:  Negative for chest pain and palpitations.   Gastrointestinal:  Negative for abdominal pain and vomiting.   Genitourinary:  Negative for dysuria and hematuria.   Musculoskeletal:  Positive for arthralgias and back pain.   Skin:  Negative for color change and rash.   Neurological:  Negative for seizures and syncope.   All other systems reviewed and are negative.    Medical History Reviewed by provider this encounter:  Tobacco  Allergies  Meds  Problems  Med Hx  Surg Hx  Fam Hx       Annual Wellness Visit Questionnaire   Maryanne is here for her Subsequent Wellness visit. Last Medicare Wellness visit information reviewed, patient interviewed, no change since last AWV. Last Medicare Wellness visit information reviewed, patient interviewed and updates made to the record today.      Health Risk Assessment:   Patient rates overall health as good. Patient feels that their physical health rating is same. Patient is satisfied with their life. Eyesight was rated as same. Hearing was rated as same. Patient feels that their emotional and mental health rating is same. Patients states they are never, rarely angry. Patient states they are often unusually tired/fatigued. Pain experienced in the last 7 days has been a lot. Patient's pain rating has been 8/10. Patient states that she has experienced no weight loss or gain in last 6 months.     Depression Screening:   PHQ-2 Score: 0  PHQ-9 Score: 0      Fall Risk Screening:   In the past year, patient has experienced: no history of falling in past year      Urinary Incontinence Screening:   Patient has leaked urine accidently in the last six months.     Home Safety:  Patient does not have trouble with  stairs inside or outside of their home. Patient has working smoke alarms and has working carbon monoxide detector. Home safety hazards include: none.     Nutrition:   Current diet is Regular, No Added Salt and Limited junk food.     Medications:   Patient is not currently taking any over-the-counter supplements. Patient is able to manage medications.     Activities of Daily Living (ADLs)/Instrumental Activities of Daily Living (IADLs):   Walk and transfer into and out of bed and chair?: Yes  Dress and groom yourself?: Yes    Bathe or shower yourself?: Yes    Feed yourself? Yes  Do your laundry/housekeeping?: Yes  Manage your money, pay your bills and track your expenses?: Yes  Make your own meals?: Yes    Do your own shopping?: Yes    Previous Hospitalizations:   Any hospitalizations or ED visits within the last 12 months?: No      Advance Care Planning:   Living will: No    Durable POA for healthcare: No    Advanced directive: No    Advanced directive counseling given: Yes    ACP document given: Yes    Patient declined ACP directive: No    End of Life Decisions reviewed with patient: Yes    Provider agrees with end of life decisions: Yes      Comments: Discussed with patient regarding living well DURABLE POWER OF  and advanced directives papers related to the same has been given for her to review and fill them up and bring them back    Cognitive Screening:   Provider or family/friend/caregiver concerned regarding cognition?: No    PREVENTIVE SCREENINGS      Cardiovascular Screening:    General: Screening Current      Diabetes Screening:     General: Screening Current      Colorectal Cancer Screening:     General: Screening Current      Breast Cancer Screening:     General: Risks and Benefits Discussed    Due for: Mammogram        Cervical Cancer Screening:    General: Screening Not Indicated      Osteoporosis Screening:    General: Screening Not Indicated, History Osteoporosis and Risks and Benefits  Discussed    Due for: DXA Axial      Abdominal Aortic Aneurysm (AAA) Screening:        General: Screening Not Indicated      Lung Cancer Screening:     General: Screening Not Indicated      Hepatitis C Screening:    General: Screening Not Indicated    Screening, Brief Intervention, and Referral to Treatment (SBIRT)    Screening  Typical number of drinks in a day: 0  Typical number of drinks in a week: 0  Interpretation: Low risk drinking behavior.    AUDIT-C Screenin) How often did you have a drink containing alcohol in the past year? never  2) How many drinks did you have on a typical day when you were drinking in the past year? 0  3) How often did you have 6 or more drinks on one occasion in the past year? never    AUDIT-C Score: 0  Interpretation: Score 0-2 (female): Negative screen for alcohol misuse    Single Item Drug Screening:  How often have you used an illegal drug (including marijuana) or a prescription medication for non-medical reasons in the past year? never    Single Item Drug Screen Score: 0  Interpretation: Negative screen for possible drug use disorder    Brief Intervention  Alcohol & drug use screenings were reviewed. No concerns regarding substance use disorder identified.     Review of Current Opioid Use    Opioid Risk Tool (ORT) Interpretation: Complete Opioid Risk Tool (ORT)    Social Determinants of Health     Financial Resource Strain: Low Risk  (2023)    Overall Financial Resource Strain (CARDIA)    • Difficulty of Paying Living Expenses: Not hard at all   Recent Concern: Financial Resource Strain - Medium Risk (2023)    Overall Financial Resource Strain (CARDIA)    • Difficulty of Paying Living Expenses: Somewhat hard   Food Insecurity: No Food Insecurity (2024)    Hunger Vital Sign    • Worried About Running Out of Food in the Last Year: Never true    • Ran Out of Food in the Last Year: Never true   Transportation Needs: No Transportation Needs (2024)    PRAPARE -  "Transportation    • Lack of Transportation (Medical): No    • Lack of Transportation (Non-Medical): No   Housing Stability: Low Risk  (5/23/2024)    Housing Stability Vital Sign    • Unable to Pay for Housing in the Last Year: No    • Number of Times Moved in the Last Year: 1    • Homeless in the Last Year: No   Utilities: Not At Risk (5/23/2024)    [facility] Utilities    • Threatened with loss of utilities: No     No results found.    Objective     /70 (BP Location: Right arm, Patient Position: Sitting, Cuff Size: Standard)   Pulse 80   Ht 5' 5\" (1.651 m)   Wt 103 kg (227 lb)   SpO2 94%   BMI 37.77 kg/m²     Physical Exam  Vitals and nursing note reviewed.   Constitutional:       General: She is not in acute distress.     Appearance: She is well-developed.   HENT:      Head: Normocephalic and atraumatic.   Eyes:      Conjunctiva/sclera: Conjunctivae normal.   Cardiovascular:      Rate and Rhythm: Normal rate and regular rhythm.      Heart sounds: No murmur heard.  Pulmonary:      Effort: Pulmonary effort is normal. No respiratory distress.      Breath sounds: Normal breath sounds.   Abdominal:      Palpations: Abdomen is soft.      Tenderness: There is no abdominal tenderness.   Musculoskeletal:         General: No swelling.      Cervical back: Neck supple.   Skin:     General: Skin is warm and dry.      Capillary Refill: Capillary refill takes less than 2 seconds.   Neurological:      Mental Status: She is alert.   Psychiatric:         Mood and Affect: Mood normal.       Administrative Statements   I have spent a total time of 40 minutes on 05/24/24 In caring for this patient including Diagnostic results, Prognosis, Risks and benefits of tx options, Instructions for management, Importance of tx compliance, Risk factor reductions, Impressions, Counseling / Coordination of care, Documenting in the medical record, Reviewing / ordering tests, medicine, procedures  , and Obtaining or reviewing history  .        "

## 2024-05-23 NOTE — PATIENT INSTRUCTIONS
Medicare Preventive Visit Patient Instructions  Thank you for completing your Welcome to Medicare Visit or Medicare Annual Wellness Visit today. Your next wellness visit will be due in one year (5/24/2025).  The screening/preventive services that you may require over the next 5-10 years are detailed below. Some tests may not apply to you based off risk factors and/or age. Screening tests ordered at today's visit but not completed yet may show as past due. Also, please note that scanned in results may not display below.  Preventive Screenings:  Service Recommendations Previous Testing/Comments   Colorectal Cancer Screening  * Colonoscopy    * Fecal Occult Blood Test (FOBT)/Fecal Immunochemical Test (FIT)  * Fecal DNA/Cologuard Test  * Flexible Sigmoidoscopy Age: 45-75 years old   Colonoscopy: every 10 years (may be performed more frequently if at higher risk)  OR  FOBT/FIT: every 1 year  OR  Cologuard: every 3 years  OR  Sigmoidoscopy: every 5 years  Screening may be recommended earlier than age 45 if at higher risk for colorectal cancer. Also, an individualized decision between you and your healthcare provider will decide whether screening between the ages of 76-85 would be appropriate. Colonoscopy: 02/06/2024  FOBT/FIT: Not on file  Cologuard: Not on file  Sigmoidoscopy: Not on file          Breast Cancer Screening Age: 40+ years old  Frequency: every 1-2 years  Not required if history of left and right mastectomy Mammogram: Not on file        Cervical Cancer Screening Between the ages of 21-29, pap smear recommended once every 3 years.   Between the ages of 30-65, can perform pap smear with HPV co-testing every 5 years.   Recommendations may differ for women with a history of total hysterectomy, cervical cancer, or abnormal pap smears in past. Pap Smear: Not on file        Hepatitis C Screening Once for adults born between 1945 and 1965  More frequently in patients at high risk for Hepatitis C Hep C Antibody: Not  on file        Diabetes Screening 1-2 times per year if you're at risk for diabetes or have pre-diabetes Fasting glucose: No results in last 5 years (No results in last 5 years)  A1C: 5.4 % of total Hgb (7/26/2023)      Cholesterol Screening Once every 5 years if you don't have a lipid disorder. May order more often based on risk factors. Lipid panel: 07/26/2023          Other Preventive Screenings Covered by Medicare:  Abdominal Aortic Aneurysm (AAA) Screening: covered once if your at risk. You're considered to be at risk if you have a family history of AAA.  Lung Cancer Screening: covers low dose CT scan once per year if you meet all of the following conditions: (1) Age 55-77; (2) No signs or symptoms of lung cancer; (3) Current smoker or have quit smoking within the last 15 years; (4) You have a tobacco smoking history of at least 20 pack years (packs per day multiplied by number of years you smoked); (5) You get a written order from a healthcare provider.  Glaucoma Screening: covered annually if you're considered high risk: (1) You have diabetes OR (2) Family history of glaucoma OR (3)  aged 50 and older OR (4)  American aged 65 and older  Osteoporosis Screening: covered every 2 years if you meet one of the following conditions: (1) You're estrogen deficient and at risk for osteoporosis based off medical history and other findings; (2) Have a vertebral abnormality; (3) On glucocorticoid therapy for more than 3 months; (4) Have primary hyperparathyroidism; (5) On osteoporosis medications and need to assess response to drug therapy.   Last bone density test (DXA Scan): Not on file.  HIV Screening: covered annually if you're between the age of 15-65. Also covered annually if you are younger than 15 and older than 65 with risk factors for HIV infection. For pregnant patients, it is covered up to 3 times per pregnancy.    Immunizations:  Immunization Recommendations   Influenza Vaccine Annual  influenza vaccination during flu season is recommended for all persons aged >= 6 months who do not have contraindications   Pneumococcal Vaccine   * Pneumococcal conjugate vaccine = PCV13 (Prevnar 13), PCV15 (Vaxneuvance), PCV20 (Prevnar 20)  * Pneumococcal polysaccharide vaccine = PPSV23 (Pneumovax) Adults 19-65 yo with certain risk factors or if 65+ yo  If never received any pneumonia vaccine: recommend Prevnar 20 (PCV20)  Give PCV20 if previously received 1 dose of PCV13 or PPSV23   Hepatitis B Vaccine 3 dose series if at intermediate or high risk (ex: diabetes, end stage renal disease, liver disease)   Respiratory syncytial virus (RSV) Vaccine - COVERED BY MEDICARE PART D  * RSVPreF3 (Arexvy) CDC recommends that adults 60 years of age and older may receive a single dose of RSV vaccine using shared clinical decision-making (SCDM)   Tetanus (Td) Vaccine - COST NOT COVERED BY MEDICARE PART B Following completion of primary series, a booster dose should be given every 10 years to maintain immunity against tetanus. Td may also be given as tetanus wound prophylaxis.   Tdap Vaccine - COST NOT COVERED BY MEDICARE PART B Recommended at least once for all adults. For pregnant patients, recommended with each pregnancy.   Shingles Vaccine (Shingrix) - COST NOT COVERED BY MEDICARE PART B  2 shot series recommended in those 19 years and older who have or will have weakened immune systems or those 50 years and older     Health Maintenance Due:      Topic Date Due   • Hepatitis C Screening  Never done   • Breast Cancer Screening: Mammogram  Never done   • Colorectal Cancer Screening  02/04/2029     Immunizations Due:      Topic Date Due   • Pneumococcal Vaccine: 65+ Years (1 of 1 - PCV) Never done   • COVID-19 Vaccine (3 - 2023-24 season) 09/01/2023     Advance Directives   What are advance directives?  Advance directives are legal documents that state your wishes and plans for medical care. These plans are made ahead of time  in case you lose your ability to make decisions for yourself. Advance directives can apply to any medical decision, such as the treatments you want, and if you want to donate organs.   What are the types of advance directives?  There are many types of advance directives, and each state has rules about how to use them. You may choose a combination of any of the following:  Living will:  This is a written record of the treatment you want. You can also choose which treatments you do not want, which to limit, and which to stop at a certain time. This includes surgery, medicine, IV fluid, and tube feedings.   Durable power of  for healthcare (DPAHC):  This is a written record that states who you want to make healthcare choices for you when you are unable to make them for yourself. This person, called a proxy, is usually a family member or a friend. You may choose more than 1 proxy.  Do not resuscitate (DNR) order:  A DNR order is used in case your heart stops beating or you stop breathing. It is a request not to have certain forms of treatment, such as CPR. A DNR order may be included in other types of advance directives.  Medical directive:  This covers the care that you want if you are in a coma, near death, or unable to make decisions for yourself. You can list the treatments you want for each condition. Treatment may include pain medicine, surgery, blood transfusions, dialysis, IV or tube feedings, and a ventilator (breathing machine).  Values history:  This document has questions about your views, beliefs, and how you feel and think about life. This information can help others choose the care that you would choose.  Why are advance directives important?  An advance directive helps you control your care. Although spoken wishes may be used, it is better to have your wishes written down. Spoken wishes can be misunderstood, or not followed. Treatments may be given even if you do not want them. An advance  directive may make it easier for your family to make difficult choices about your care.   Weight Management   Why it is important to manage your weight:  Being overweight increases your risk of health conditions such as heart disease, high blood pressure, type 2 diabetes, and certain types of cancer. It can also increase your risk for osteoarthritis, sleep apnea, and other respiratory problems. Aim for a slow, steady weight loss. Even a small amount of weight loss can lower your risk of health problems.  How to lose weight safely:  A safe and healthy way to lose weight is to eat fewer calories and get regular exercise. You can lose up about 1 pound a week by decreasing the number of calories you eat by 500 calories each day.   Healthy meal plan for weight management:  A healthy meal plan includes a variety of foods, contains fewer calories, and helps you stay healthy. A healthy meal plan includes the following:  Eat whole-grain foods more often.  A healthy meal plan should contain fiber. Fiber is the part of grains, fruits, and vegetables that is not broken down by your body. Whole-grain foods are healthy and provide extra fiber in your diet. Some examples of whole-grain foods are whole-wheat breads and pastas, oatmeal, brown rice, and bulgur.  Eat a variety of vegetables every day.  Include dark, leafy greens such as spinach, kale, obey greens, and mustard greens. Eat yellow and orange vegetables such as carrots, sweet potatoes, and winter squash.   Eat a variety of fruits every day.  Choose fresh or canned fruit (canned in its own juice or light syrup) instead of juice. Fruit juice has very little or no fiber.  Eat low-fat dairy foods.  Drink fat-free (skim) milk or 1% milk. Eat fat-free yogurt and low-fat cottage cheese. Try low-fat cheeses such as mozzarella and other reduced-fat cheeses.  Choose meat and other protein foods that are low in fat.  Choose beans or other legumes such as split peas or lentils.  Choose fish, skinless poultry (chicken or turkey), or lean cuts of red meat (beef or pork). Before you cook meat or poultry, cut off any visible fat.   Use less fat and oil.  Try baking foods instead of frying them. Add less fat, such as margarine, sour cream, regular salad dressing and mayonnaise to foods. Eat fewer high-fat foods. Some examples of high-fat foods include french fries, doughnuts, ice cream, and cakes.  Eat fewer sweets.  Limit foods and drinks that are high in sugar. This includes candy, cookies, regular soda, and sweetened drinks.  Exercise:  Exercise at least 30 minutes per day on most days of the week. Some examples of exercise include walking, biking, dancing, and swimming. You can also fit in more physical activity by taking the stairs instead of the elevator or parking farther away from stores. Ask your healthcare provider about the best exercise plan for you.      © Copyright [employer] 2018 Information is for End User's use only and may not be sold, redistributed or otherwise used for commercial purposes. All illustrations and images included in CareNotes® are the copyrighted property of [employer] or [employer]

## 2024-05-24 ENCOUNTER — OFFICE VISIT (OUTPATIENT)
Dept: FAMILY MEDICINE CLINIC | Facility: CLINIC | Age: 74
End: 2024-05-24
Payer: MEDICARE

## 2024-05-24 VITALS
HEIGHT: 65 IN | OXYGEN SATURATION: 94 % | WEIGHT: 227 LBS | SYSTOLIC BLOOD PRESSURE: 118 MMHG | HEART RATE: 80 BPM | DIASTOLIC BLOOD PRESSURE: 70 MMHG | BODY MASS INDEX: 37.82 KG/M2

## 2024-05-24 DIAGNOSIS — Z00.00 MEDICARE ANNUAL WELLNESS VISIT, SUBSEQUENT: ICD-10-CM

## 2024-05-24 DIAGNOSIS — R82.90 ABNORMAL FINDING ON URINALYSIS: ICD-10-CM

## 2024-05-24 DIAGNOSIS — G89.4 CHRONIC PAIN SYNDROME: Primary | ICD-10-CM

## 2024-05-24 DIAGNOSIS — E66.9 OBESITY (BMI 35.0-39.9 WITHOUT COMORBIDITY): ICD-10-CM

## 2024-05-24 DIAGNOSIS — Z13.0 SCREENING FOR DEFICIENCY ANEMIA: ICD-10-CM

## 2024-05-24 DIAGNOSIS — G40.909 SEIZURE DISORDER (HCC): ICD-10-CM

## 2024-05-24 DIAGNOSIS — M79.7 FIBROMYALGIA: ICD-10-CM

## 2024-05-24 DIAGNOSIS — G43.909 MIGRAINE WITHOUT STATUS MIGRAINOSUS, NOT INTRACTABLE, UNSPECIFIED MIGRAINE TYPE: ICD-10-CM

## 2024-05-24 DIAGNOSIS — E78.5 DYSLIPIDEMIA: ICD-10-CM

## 2024-05-24 DIAGNOSIS — E03.9 ACQUIRED HYPOTHYROIDISM: ICD-10-CM

## 2024-05-24 DIAGNOSIS — T78.40XD ALLERGY, SUBSEQUENT ENCOUNTER: ICD-10-CM

## 2024-05-24 DIAGNOSIS — L60.8 NAIL DEFORMITY: ICD-10-CM

## 2024-05-24 DIAGNOSIS — K21.9 GASTROESOPHAGEAL REFLUX DISEASE WITHOUT ESOPHAGITIS: ICD-10-CM

## 2024-05-24 DIAGNOSIS — K22.70 BARRETT'S ESOPHAGUS WITHOUT DYSPLASIA: ICD-10-CM

## 2024-05-24 DIAGNOSIS — R73.03 PRE-DIABETES: ICD-10-CM

## 2024-05-24 DIAGNOSIS — F11.20 CONTINUOUS OPIOID DEPENDENCE (HCC): ICD-10-CM

## 2024-05-24 PROCEDURE — G0439 PPPS, SUBSEQ VISIT: HCPCS | Performed by: INTERNAL MEDICINE

## 2024-05-24 PROCEDURE — 99213 OFFICE O/P EST LOW 20 MIN: CPT | Performed by: INTERNAL MEDICINE

## 2024-05-24 RX ORDER — HYDROCODONE BITARTRATE AND ACETAMINOPHEN 5; 325 MG/1; MG/1
1 TABLET ORAL 2 TIMES DAILY PRN
Qty: 60 TABLET | Refills: 0 | Status: SHIPPED | OUTPATIENT
Start: 2024-05-24

## 2024-05-24 NOTE — ASSESSMENT & PLAN NOTE
Currently patient taking levothyroxine 100 mcg daily we will continue with the same and follow-up with the TSH level which I have ordered

## 2024-05-24 NOTE — ASSESSMENT & PLAN NOTE
BMI is 37.77 went up slightly recommend very strongly strict diet exercise lifestyle modification to lose weight.

## 2024-05-24 NOTE — ASSESSMENT & PLAN NOTE
Patient with chronic pain syndrome the low back area currently taking hydrocodone for the pain when she does not take it she is having difficulty with doing her day-to-day activities PDMP reviewed pain medication renewed

## 2024-06-07 ENCOUNTER — OFFICE VISIT (OUTPATIENT)
Dept: GASTROENTEROLOGY | Facility: CLINIC | Age: 74
End: 2024-06-07
Payer: MEDICARE

## 2024-06-07 VITALS
SYSTOLIC BLOOD PRESSURE: 136 MMHG | HEIGHT: 65 IN | BODY MASS INDEX: 37.59 KG/M2 | HEART RATE: 70 BPM | WEIGHT: 225.6 LBS | DIASTOLIC BLOOD PRESSURE: 81 MMHG

## 2024-06-07 DIAGNOSIS — K64.9 HEMORRHOIDS, UNSPECIFIED HEMORRHOID TYPE: Primary | ICD-10-CM

## 2024-06-07 DIAGNOSIS — K92.1 BLOOD IN STOOL: ICD-10-CM

## 2024-06-07 PROCEDURE — 99213 OFFICE O/P EST LOW 20 MIN: CPT | Performed by: INTERNAL MEDICINE

## 2024-06-07 PROCEDURE — 46221 LIGATION OF HEMORRHOID(S): CPT | Performed by: INTERNAL MEDICINE

## 2024-06-07 NOTE — PROGRESS NOTES
[facility] [place] - Outpatient Follow-up Note  Maryanne Marcos 74 y.o. female MRN: [id number]  Encounter: [id number]          ASSESSMENT AND PLAN:      1. Hemorrhoids, unspecified hemorrhoid type  -     Anal Excision Procedures  2. Blood in stool    History of occasional blood in stool bright red in color, most likely hemorrhoidal.  Recent colonoscopy was otherwise unremarkable.  Denies any weakness dizziness.  Denies any hard stools or painful defecation.    Digital exam no mass, brown stool.  Small skin tag on the right side.  Anoscopy performed, grade 2 hemorrhoid at 2 o'clock position.    Anal Excision Procedures    Performed by: Matthew Sawyer MD  Authorized by: Matthew Sawyer MD    Universal Protocol:     Verbal consent obtained?: Yes      Written consent obtained?: Yes      Risks and benefits: Risks, benefits and alternatives were discussed      Consent given by:  Patient    Patient states understanding of procedure being performed: Yes      Patient identity confirmed:  Verbally with patient  A time out verifies correct patient, procedure, equipment, support staff and site/side marked as required:   Procedure Type: hemorrhoidectomy    Hemorrhoidectomy Details:   Hemorrhoid type: internal    Internal position:  Two o'clock  Single rubber band ligation      Satisfactory    Banding.  ______________________________________________________________________    SUBJECTIVE:      Patient came in for evaluation of bloody stool, she has noted off-and-on bright red in color or straining, has had a recent colonoscopy done was unremarkable except for small hemorrhoid.  Denies any blood melena.  Denies any nausea vomiting chest pain shortness of breath fever chills rash, diet medications or other current and prior records noted.      REVIEW OF SYSTEMS IS OTHERWISE NEGATIVE.      Historical Information   Past Medical History:   Diagnosis Date   • Anxiety with depression    • Arm fracture, left     x2   •  Arthritis    • Degenerative joint disease    • Disease of thyroid gland    • Encounter for immunization 10/18/2022   • Fibromyalgia    • GERD (gastroesophageal reflux disease)    • Hemorrhoids    • Hypertension    • Hypothyroidism    • Kidney stones    • Low back pain    • Neurological disorder     Migraine   • Rheumatoid arthritis (HCC)    • Seasonal allergies    • Seizure disorder (HCC)      Past Surgical History:   Procedure Laterality Date   • COLONOSCOPY     • DILATION AND CURETTAGE OF UTERUS     • NECK SURGERY      Plate   • ORIF FOREARM FRACTURE Left    • SHOULDER SURGERY      x2   • TONSILLECTOMY       Social History   Social History     Substance and Sexual Activity   Alcohol Use Not Currently    Comment: glass of wine with dinner out, 1x a week stopped      Social History     Substance and Sexual Activity   Drug Use Never     Social History     Tobacco Use   Smoking Status Former   • Current packs/day: 0.00   • Average packs/day: 1 pack/day for 35.0 years (35.0 ttl pk-yrs)   • Types: Cigarettes   • Quit date:    • Years since quittin.4   • Passive exposure: Past   Smokeless Tobacco Never     Family History   Problem Relation Age of Onset   • Stroke Mother    • Heart disease Father    • Colon cancer Maternal Grandmother        Meds/Allergies       Current Outpatient Medications:   •  albuterol (PROVENTIL HFA,VENTOLIN HFA) 90 mcg/act inhaler  •  DULoxetine (CYMBALTA) 30 mg delayed release capsule  •  fluticasone (FLONASE) 50 mcg/act nasal spray  •  HYDROcodone-acetaminophen (NORCO) 5-325 mg per tablet  •  levETIRAcetam (KEPPRA) 500 mg tablet  •  Magnesium 400 MG TABS  •  montelukast (SINGULAIR) 10 mg tablet  •  triamcinolone (KENALOG) 0.1 % cream  •  vitamin B-12 (VITAMIN B-12) 1,000 mcg tablet  •  Cholecalciferol (Vitamin D3) 50 MCG (2000) TABS  •  levothyroxine 100 mcg tablet  •  pantoprazole (PROTONIX) 40 mg tablet    Allergies   Allergen Reactions   • Beta Adrenergic Blockers  "Shortness Of Breath   • Sulfamethoxazole-Trimethoprim Shortness Of Breath   • Meloxicam Confusion           Objective     Blood pressure 136/81, pulse 70, height 5' 5\" (1.651 m), weight 102 kg (225 lb 9.6 oz). Body mass index is 37.54 kg/m².      PHYSICAL EXAM:      General Appearance:   Alert, cooperative, no distress   HEENT:   Normocephalic, atraumatic, anicteric.     Neck:  Supple, symmetrical, trachea midline   Lungs:   Clear to auscultation bilaterally; no rales, rhonchi or wheezing; respirations unlabored    Heart::   Regular rate and rhythm; no murmur.   Abdomen:   Soft, non-tender, non-distended; normal bowel sounds; no masses, no organomegaly    Genitalia:   Deferred    Rectal:   As above   Extremities:  No cyanosis, clubbing or edema    Skin:  No jaundice, rashes, or lesions    Lymph nodes:  No palpable cervical lymphadenopathy        Lab Results:   No visits with results within 1 Day(s) from this visit.   Latest known visit with results is:   Hospital Outpatient Visit on 02/06/2024   Component Date Value   • Case Report 02/06/2024                      Value:Surgical Pathology Report                         Case: [id number]                                  Authorizing Provider:  Matthew Sawyer MD           Collected:           02/06/2024 1153              Ordering Location:     [facility]   Received:            02/06/2024 1354                                     Center                                                                       Pathologist:           Kayla Guerrero MD                                                       Specimens:   A) - Esophagus, lower esophagus bx @ 34cm hx of barretts                                            B) - Esophagus, esophagus bx @ 32cm hx of barretts                                                  C) - Large Intestine, Left/Descending Colon, descending colon polyp cold snare                      D) - Large Intestine, Sigmoid Colon, sigmoid polyp "                                        • Final Diagnosis 02/06/2024                      Value:This result contains rich text formatting which cannot be displayed here.   • Additional Information 02/06/2024                      Value:This result contains rich text formatting which cannot be displayed here.   • Synoptic Checklist 02/06/2024                      Value:                            COLON/RECTUM POLYP FORM - GI - D, C                                                                                     :    Other                                                         :    HP     • Gross Description 02/06/2024                      Value:This result contains rich text formatting which cannot be displayed here.   • Clinical Information 02/06/2024                      Value:eg junction is at 35         Radiology Results:   No results found.

## 2024-06-11 ENCOUNTER — OFFICE VISIT (OUTPATIENT)
Age: 74
End: 2024-06-11
Payer: MEDICARE

## 2024-06-11 VITALS
HEIGHT: 65 IN | SYSTOLIC BLOOD PRESSURE: 127 MMHG | HEART RATE: 74 BPM | DIASTOLIC BLOOD PRESSURE: 82 MMHG | BODY MASS INDEX: 37.49 KG/M2 | WEIGHT: 225 LBS | RESPIRATION RATE: 17 BRPM

## 2024-06-11 DIAGNOSIS — M79.671 PAIN IN BOTH FEET: Primary | ICD-10-CM

## 2024-06-11 DIAGNOSIS — M79.672 PAIN IN BOTH FEET: Primary | ICD-10-CM

## 2024-06-11 DIAGNOSIS — L60.8 NAIL DEFORMITY: ICD-10-CM

## 2024-06-11 DIAGNOSIS — B35.1 ONYCHOMYCOSIS: ICD-10-CM

## 2024-06-11 DIAGNOSIS — L03.031 PARONYCHIA OF TOENAIL OF RIGHT FOOT: ICD-10-CM

## 2024-06-11 PROCEDURE — 99202 OFFICE O/P NEW SF 15 MIN: CPT | Performed by: PODIATRIST

## 2024-06-11 RX ORDER — TERBINAFINE HYDROCHLORIDE 250 MG/1
250 TABLET ORAL DAILY
Qty: 30 TABLET | Refills: 0 | Status: SHIPPED | OUTPATIENT
Start: 2024-06-11 | End: 2024-07-11

## 2024-06-11 NOTE — PROGRESS NOTES
Assessment/Plan: Pain upon ambulation.  Mycosis of hallux nail bilateral.  Paronychia right hallux.    Plan.  Chart reviewed.  PCP notes reviewed.  Lab work reviewed.  Patient examined.  At this time we will treat patient for fungus.  She will be started on oral terbinafine.  She has no elevation in liver function test.  In addition she will take vitamin B 5.  She will spray her shoes with Lysol.  Aftercare instruction given.  Return for follow-up.         Diagnoses and all orders for this visit:    Pain in both feet    Nail deformity  -     Ambulatory referral to Podiatry    Onychomycosis  -     terbinafine (LamISIL) 250 mg tablet; Take 1 tablet (250 mg total) by mouth daily    Paronychia of toenail of right foot          Subjective: Patient is seen in referral for evaluation and treatment of dystrophic discolored toenails.  No history of trauma    Allergies   Allergen Reactions    Beta Adrenergic Blockers Shortness Of Breath    Sulfamethoxazole-Trimethoprim Shortness Of Breath    Meloxicam Confusion         Current Outpatient Medications:     albuterol (PROVENTIL HFA,VENTOLIN HFA) 90 mcg/act inhaler, Inhale 2 puffs every 6 (six) hours as needed for wheezing, Disp: 8 g, Rfl: 1    Cholecalciferol (Vitamin D3) 50 MCG (2000 UT) TABS, Take 2,000 Units by mouth in the morning, Disp: , Rfl:     DULoxetine (CYMBALTA) 30 mg delayed release capsule, Take 1 capsule (30 mg total) by mouth every morning, Disp: 90 capsule, Rfl: 1    fluticasone (FLONASE) 50 mcg/act nasal spray, 2 sprays into each nostril daily, Disp: 32 g, Rfl: 3    HYDROcodone-acetaminophen (NORCO) 5-325 mg per tablet, Take 1 tablet by mouth 2 (two) times a day as needed for pain Max Daily Amount: 2 tablets, Disp: 60 tablet, Rfl: 0    levETIRAcetam (KEPPRA) 500 mg tablet, Take 1 tablet (500 mg total) by mouth 2 (two) times a day, Disp: 180 tablet, Rfl: 1    levothyroxine 100 mcg tablet, Take 1 tablet (100 mcg total) by mouth every morning, Disp: 90 tablet,  Rfl: 1    Magnesium 400 MG TABS, Take 1 tablet (400 mg total) by mouth in the morning, Disp: 90 tablet, Rfl: 0    montelukast (SINGULAIR) 10 mg tablet, Take 1 tablet (10 mg total) by mouth every morning, Disp: 90 tablet, Rfl: 1    pantoprazole (PROTONIX) 40 mg tablet, Take 1 tablet (40 mg total) by mouth daily, Disp: 90 tablet, Rfl: 1    terbinafine (LamISIL) 250 mg tablet, Take 1 tablet (250 mg total) by mouth daily, Disp: 30 tablet, Rfl: 0    triamcinolone (KENALOG) 0.1 % cream, Apply topically 2 (two) times a day, Disp: 45 g, Rfl: 1    vitamin B-12 (VITAMIN B-12) 1,000 mcg tablet, Take 1 tablet (1,000 mcg total) by mouth daily, Disp: 90 tablet, Rfl: 1    Patient Active Problem List   Diagnosis    Chronic pain syndrome    Acquired hypothyroidism    Seizure disorder (HCC)    Gastroesophageal reflux disease without esophagitis    Allergies    Fibromyalgia    Migraines    Continuous opioid dependence (HCC)    Abnormal finding on urinalysis    Carson's esophagus without dysplasia    Diverticulosis    Hx of colonic polyp    Obesity (BMI 35.0-39.9 without comorbidity)    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 female.    HPI    The following portions of the patient's history were reviewed and updated as appropriate:     family history includes Arthritis in her mother; Colon cancer in her maternal grandmother; Heart disease in her father; Stroke in her mother.      reports that she quit smoking about 34 years ago. Her smoking use included cigarettes. She has a 35 pack-year smoking history. She has been exposed to tobacco smoke. She has never used smokeless tobacco. She reports that she does not currently use alcohol. She reports that she does not use drugs.    Vitals:    06/11/24 1315   BP: 127/82   Pulse: 74   Resp: 17       Review of Systems      Objective:  Patient's shoes and socks removed.   Foot ExamPhysical Exam  Vitals and nursing note  reviewed.   Constitutional:       Appearance: Normal appearance.   Cardiovascular:      Rate and Rhythm: Normal rate and regular rhythm.   Skin:     Capillary Refill: Capillary refill takes less than 2 seconds.      Comments: All nails are dystrophic.  Hallux bilateral demonstrates distal mycosis with secondary lysis of nail.  Right hallux has paronychia.  Negative pus.   Neurological:      Mental Status: She is alert.   Psychiatric:         Mood and Affect: Mood normal.         Behavior: Behavior normal.         Thought Content: Thought content normal.         Judgment: Judgment normal.

## 2024-06-13 ENCOUNTER — TELEPHONE (OUTPATIENT)
Dept: FAMILY MEDICINE CLINIC | Facility: CLINIC | Age: 74
End: 2024-06-13

## 2024-06-13 ENCOUNTER — TELEPHONE (OUTPATIENT)
Age: 74
End: 2024-06-13

## 2024-06-13 NOTE — TELEPHONE ENCOUNTER
Patient called and stated she recently saw her podiatrist and was prescribed terbinafine 250 mg tablet.  She has not picked up the medication from the pharmacy as of yet.  She would like to know Dr. Simeon's thoughts on the medication before taking it.  Please advise.  Thank you!

## 2024-06-14 ENCOUNTER — TELEPHONE (OUTPATIENT)
Dept: FAMILY MEDICINE CLINIC | Facility: CLINIC | Age: 74
End: 2024-06-14

## 2024-06-14 NOTE — TELEPHONE ENCOUNTER
Pt called back info was give about her taking medication  and to do her lab work 1 week before seeing dr. Simeon

## 2024-06-18 ENCOUNTER — APPOINTMENT (OUTPATIENT)
Dept: LAB | Facility: CLINIC | Age: 74
End: 2024-06-18
Payer: MEDICARE

## 2024-06-18 DIAGNOSIS — Z13.0 SCREENING FOR DEFICIENCY ANEMIA: ICD-10-CM

## 2024-06-18 DIAGNOSIS — R73.03 PRE-DIABETES: ICD-10-CM

## 2024-06-18 DIAGNOSIS — E03.9 ACQUIRED HYPOTHYROIDISM: ICD-10-CM

## 2024-06-18 DIAGNOSIS — E78.5 DYSLIPIDEMIA: ICD-10-CM

## 2024-06-18 LAB
ALBUMIN SERPL BCP-MCNC: 4 G/DL (ref 3.5–5)
ALP SERPL-CCNC: 57 U/L (ref 34–104)
ALT SERPL W P-5'-P-CCNC: 16 U/L (ref 7–52)
ANION GAP SERPL CALCULATED.3IONS-SCNC: 8 MMOL/L (ref 4–13)
AST SERPL W P-5'-P-CCNC: 17 U/L (ref 13–39)
BACTERIA UR QL AUTO: ABNORMAL /HPF
BASOPHILS # BLD AUTO: 0.03 THOUSANDS/ÂΜL (ref 0–0.1)
BASOPHILS NFR BLD AUTO: 1 % (ref 0–1)
BILIRUB SERPL-MCNC: 0.48 MG/DL (ref 0.2–1)
BILIRUB UR QL STRIP: NEGATIVE
BUN SERPL-MCNC: 16 MG/DL (ref 5–25)
CALCIUM SERPL-MCNC: 9.3 MG/DL (ref 8.4–10.2)
CHLORIDE SERPL-SCNC: 101 MMOL/L (ref 96–108)
CHOLEST SERPL-MCNC: 180 MG/DL
CLARITY UR: ABNORMAL
CO2 SERPL-SCNC: 33 MMOL/L (ref 21–32)
COLOR UR: YELLOW
CREAT SERPL-MCNC: 0.97 MG/DL (ref 0.6–1.3)
EOSINOPHIL # BLD AUTO: 0.15 THOUSAND/ÂΜL (ref 0–0.61)
EOSINOPHIL NFR BLD AUTO: 3 % (ref 0–6)
ERYTHROCYTE [DISTWIDTH] IN BLOOD BY AUTOMATED COUNT: 13.2 % (ref 11.6–15.1)
EST. AVERAGE GLUCOSE BLD GHB EST-MCNC: 120 MG/DL
GFR SERPL CREATININE-BSD FRML MDRD: 58 ML/MIN/1.73SQ M
GLUCOSE P FAST SERPL-MCNC: 102 MG/DL (ref 65–99)
GLUCOSE UR STRIP-MCNC: NEGATIVE MG/DL
HBA1C MFR BLD: 5.8 %
HCT VFR BLD AUTO: 47 % (ref 34.8–46.1)
HDLC SERPL-MCNC: 55 MG/DL
HGB BLD-MCNC: 14.8 G/DL (ref 11.5–15.4)
HGB UR QL STRIP.AUTO: ABNORMAL
IMM GRANULOCYTES # BLD AUTO: 0.01 THOUSAND/UL (ref 0–0.2)
IMM GRANULOCYTES NFR BLD AUTO: 0 % (ref 0–2)
KETONES UR STRIP-MCNC: NEGATIVE MG/DL
LDLC SERPL CALC-MCNC: 102 MG/DL (ref 0–100)
LEUKOCYTE ESTERASE UR QL STRIP: ABNORMAL
LYMPHOCYTES # BLD AUTO: 1.51 THOUSANDS/ÂΜL (ref 0.6–4.47)
LYMPHOCYTES NFR BLD AUTO: 26 % (ref 14–44)
MCH RBC QN AUTO: 30 PG (ref 26.8–34.3)
MCHC RBC AUTO-ENTMCNC: 31.5 G/DL (ref 31.4–37.4)
MCV RBC AUTO: 95 FL (ref 82–98)
MONOCYTES # BLD AUTO: 0.55 THOUSAND/ÂΜL (ref 0.17–1.22)
MONOCYTES NFR BLD AUTO: 10 % (ref 4–12)
MUCOUS THREADS UR QL AUTO: ABNORMAL
NEUTROPHILS # BLD AUTO: 3.55 THOUSANDS/ÂΜL (ref 1.85–7.62)
NEUTS SEG NFR BLD AUTO: 60 % (ref 43–75)
NITRITE UR QL STRIP: NEGATIVE
NON-SQ EPI CELLS URNS QL MICRO: ABNORMAL /HPF
NONHDLC SERPL-MCNC: 125 MG/DL
NRBC BLD AUTO-RTO: 0 /100 WBCS
PH UR STRIP.AUTO: 6.5 [PH]
PLATELET # BLD AUTO: 250 THOUSANDS/UL (ref 149–390)
PMV BLD AUTO: 12.2 FL (ref 8.9–12.7)
POTASSIUM SERPL-SCNC: 4.1 MMOL/L (ref 3.5–5.3)
PROT SERPL-MCNC: 6.9 G/DL (ref 6.4–8.4)
PROT UR STRIP-MCNC: ABNORMAL MG/DL
RBC # BLD AUTO: 4.94 MILLION/UL (ref 3.81–5.12)
RBC #/AREA URNS AUTO: ABNORMAL /HPF
SODIUM SERPL-SCNC: 142 MMOL/L (ref 135–147)
SP GR UR STRIP.AUTO: 1.02 (ref 1–1.03)
T4 FREE SERPL-MCNC: 1.17 NG/DL (ref 0.61–1.12)
TRIGL SERPL-MCNC: 113 MG/DL
TSH SERPL DL<=0.05 MIU/L-ACNC: 0.14 UIU/ML (ref 0.45–4.5)
UROBILINOGEN UR STRIP-ACNC: <2 MG/DL
WBC # BLD AUTO: 5.8 THOUSAND/UL (ref 4.31–10.16)
WBC #/AREA URNS AUTO: ABNORMAL /HPF

## 2024-06-18 PROCEDURE — 84439 ASSAY OF FREE THYROXINE: CPT

## 2024-06-18 PROCEDURE — 83036 HEMOGLOBIN GLYCOSYLATED A1C: CPT

## 2024-06-18 PROCEDURE — 80061 LIPID PANEL: CPT

## 2024-06-18 PROCEDURE — 84443 ASSAY THYROID STIM HORMONE: CPT

## 2024-06-18 PROCEDURE — 87086 URINE CULTURE/COLONY COUNT: CPT

## 2024-06-18 PROCEDURE — 36415 COLL VENOUS BLD VENIPUNCTURE: CPT

## 2024-06-18 PROCEDURE — 81001 URINALYSIS AUTO W/SCOPE: CPT

## 2024-06-18 PROCEDURE — 80053 COMPREHEN METABOLIC PANEL: CPT

## 2024-06-18 PROCEDURE — 85025 COMPLETE CBC W/AUTO DIFF WBC: CPT

## 2024-06-20 LAB — BACTERIA UR CULT: NORMAL

## 2024-06-21 NOTE — PROGRESS NOTES
Office Visit Note  24     Maryanne Marcos 74 y.o. female MRN: [id number]  : 1950    Assessment:     1. Abnormal finding on urinalysis  Assessment & Plan:  Urinalysis showing 3+ blood bacteria and protein patient has no symptoms history of kidney stones many years back we will get a repeat urine analysis and culture done.  Also get ultrasound of the kidneys.  Orders:  -     US kidney and bladder; Future; Expected date: 2024  2. Acquired hypothyroidism  Assessment & Plan:  Continue with levothyroxine 100 mcg daily TSH level came back abnormal slight increase in T4 at 1.17 and TSH at 0.145 we will repeat the test if it is still coming back low with TSH we will adjust the dosage of the levothyroxine to 88 mcg daily.  Patient had been on 100 mcg daily for a long time  Orders:  -     TSH, 3rd generation with Free T4 reflex; Future; Expected date: 2024  3. Chronic pain syndrome  Assessment & Plan:  Patient with chronic pain syndrome PDMP reviewed patient with right hip pain also recommend patient be seen by the orthopedics she is going to see the orthopedic doctor her son is following.  Movements of the right hip causing discomfort and pain  Orders:  -     HYDROcodone-acetaminophen (NORCO) 5-325 mg per tablet; Take 1 tablet by mouth 2 (two) times a day as needed for pain Max Daily Amount: 2 tablets  4. Allergy, subsequent encounter  Assessment & Plan:  Continue Singulair and Flonase for her allergy symptoms  5. Carson's esophagus without dysplasia  Assessment & Plan:  Patient is on pantoprazole 40 mg daily will continue the same recommend to avoid spicy food keep the head end of the bed up as much as possible  6. Continuous opioid dependence (HCC)  Assessment & Plan:  Patient is on hydrocodone we will continue the same without pain medication she not able to function.  7. Diverticulosis  8. Fibromyalgia  Assessment & Plan:  Continue with Cymbalta 30 mg daily which appears to have helped  9.  Gastroesophageal reflux disease without esophagitis  Assessment & Plan:  Continue pantoprazole  10. Migraine without status migrainosus, not intractable, unspecified migraine type  Assessment & Plan:  Headaches much better she takes Advil as needed  11. Nail deformity  12. Obesity (BMI 35.0-39.9 without comorbidity)  Assessment & Plan:  BMI is 37.67 emphasized again regarding diet exercise lifestyle modification to lose weight  13. Seizure disorder (HCC)  Assessment & Plan:  Patient is on Keppra 500 mg twice daily continue the same  14. Hematuria, unspecified type  -      kidney and bladder; Future; Expected date: 06/24/2024  -     UA w Reflex to Microscopic w Reflex to Culture; Future             Discussion Summary and Plan:  Today's care plan and medications were reviewed with patient in detail and all their questions answered to their satisfaction.    Chief Complaint   Patient presents with    Follow-up      Subjective:  Patient is coming here for a follow-up evaluation with regards to symptoms of chronic pain syndrome hypothyroidism fibromyalgia and gastroesophageal reflux disease.  Patient has been experiencing increasing pain in the right hip..  Especially with the movements patient gets increasing pain in the right hip.  No injury.  No chest pain.  Medication reviewed labs reviewed        The following portions of the patient's history were reviewed and updated as appropriate: allergies, current medications, past family history, past medical history, past social history, past surgical history and problem list.    Review of Systems   Constitutional:  Negative for chills and fever.   HENT:  Negative for ear pain and sore throat.    Eyes:  Negative for pain and visual disturbance.   Respiratory:  Negative for cough and shortness of breath.    Cardiovascular:  Negative for chest pain and palpitations.   Gastrointestinal:  Negative for abdominal pain and vomiting.   Genitourinary:  Negative for dysuria and  hematuria.   Musculoskeletal:  Positive for arthralgias and back pain.   Skin:  Negative for color change and rash.   Neurological:  Negative for seizures and syncope.   All other systems reviewed and are negative.        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    Nail deformity     Past Medical History:   Diagnosis Date    Allergic rhinitis 1980    Anxiety with depression     Arm fracture, left     x2    Arthritis     Callus     Degenerative joint disease     Disease of thyroid gland 1989    Encounter for immunization 10/18/2022    Fibromyalgia     GERD (gastroesophageal reflux disease)     Hemorrhoids     Hypertension     Hypothyroidism     Kidney stones     Low back pain     Neurological disorder 1998    Migraine    Rheumatoid arthritis (HCC) 1999    Seasonal allergies     Seizure disorder (HCC)      Past Surgical History:   Procedure Laterality Date    COLONOSCOPY      DILATION AND CURETTAGE OF UTERUS      NECK SURGERY      Plate    ORIF FOREARM FRACTURE Left     SHOULDER SURGERY      x2    TOENAIL EXCISION  1990    pinkie toes    TONSILLECTOMY       Social History     Substance and Sexual Activity   Alcohol Use Not Currently    Comment: glass of wine with dinner out, 1x a week stopped 2009     Social History     Substance and Sexual Activity   Drug Use Never     Social History     Tobacco Use   Smoking Status Former    Current packs/day: 0.00    Average packs/day: 1 pack/day for 35.0 years (35.0 ttl pk-yrs)    Types: Cigarettes    Quit date: 1990    Years since  quittin.5    Passive exposure: Past   Smokeless Tobacco Never     Family History   Problem Relation Age of Onset    Stroke Mother     Arthritis Mother     Heart disease Father     Colon cancer Maternal Grandmother      Health Maintenance Due   Topic    Hepatitis C Screening     Breast Cancer Screening: Mammogram     Osteoporosis Screening     Zoster Vaccine (1 of 2)    RSV Vaccine Age 60+ Years (1 - 1-dose 60+ series)    Pneumococcal Vaccine: 65+ Years (1 of 1 - PCV)    COVID-19 Vaccine (3 - 2023- season)      Meds/Allergies       Current Outpatient Medications:     albuterol (PROVENTIL HFA,VENTOLIN HFA) 90 mcg/act inhaler, Inhale 2 puffs every 6 (six) hours as needed for wheezing, Disp: 8 g, Rfl: 1    Cholecalciferol (Vitamin D3) 50 MCG (2000 UT) TABS, Take 2,000 Units by mouth in the morning, Disp: , Rfl:     DULoxetine (CYMBALTA) 30 mg delayed release capsule, Take 1 capsule (30 mg total) by mouth every morning, Disp: 90 capsule, Rfl: 1    fluticasone (FLONASE) 50 mcg/act nasal spray, 2 sprays into each nostril daily, Disp: 32 g, Rfl: 3    HYDROcodone-acetaminophen (NORCO) 5-325 mg per tablet, Take 1 tablet by mouth 2 (two) times a day as needed for pain Max Daily Amount: 2 tablets, Disp: 60 tablet, Rfl: 0    levETIRAcetam (KEPPRA) 500 mg tablet, Take 1 tablet (500 mg total) by mouth 2 (two) times a day, Disp: 180 tablet, Rfl: 1    levothyroxine 100 mcg tablet, Take 1 tablet (100 mcg total) by mouth every morning, Disp: 90 tablet, Rfl: 1    Magnesium 400 MG TABS, Take 1 tablet (400 mg total) by mouth in the morning, Disp: 90 tablet, Rfl: 0    montelukast (SINGULAIR) 10 mg tablet, Take 1 tablet (10 mg total) by mouth every morning, Disp: 90 tablet, Rfl: 1    pantoprazole (PROTONIX) 40 mg tablet, Take 1 tablet (40 mg total) by mouth daily, Disp: 90 tablet, Rfl: 1    terbinafine (LamISIL) 250 mg tablet, Take 1 tablet (250 mg total) by mouth daily, Disp: 30 tablet, Rfl: 0    triamcinolone (KENALOG) 0.1 %  "cream, Apply topically 2 (two) times a day, Disp: 45 g, Rfl: 1    vitamin B-12 (VITAMIN B-12) 1,000 mcg tablet, Take 1 tablet (1,000 mcg total) by mouth daily, Disp: 90 tablet, Rfl: 1      Objective:    Vitals:   /78 (BP Location: Right arm, Patient Position: Sitting, Cuff Size: Standard)   Pulse 82   Ht 5' 5\" (1.651 m)   Wt 103 kg (226 lb 6.4 oz)   SpO2 93%   BMI 37.67 kg/m²   Body mass index is 37.67 kg/m².  Vitals:    06/24/24 1055   Weight: 103 kg (226 lb 6.4 oz)       Physical Exam  Vitals and nursing note reviewed.   Constitutional:       Appearance: Normal appearance.   Cardiovascular:      Rate and Rhythm: Normal rate and regular rhythm.      Heart sounds: Normal heart sounds.   Pulmonary:      Effort: Pulmonary effort is normal.      Breath sounds: Normal breath sounds.   Abdominal:      General: Abdomen is flat.      Palpations: Abdomen is soft.   Musculoskeletal:      Cervical back: Normal range of motion and neck supple.      Right lower leg: No edema.      Left lower leg: No edema.      Comments: Movements of the lumbar spine also right hip causing discomfort and pain   Skin:     General: Skin is warm and dry.   Neurological:      Mental Status: She is alert and oriented to person, place, and time.   Psychiatric:         Mood and Affect: Mood normal.         Lab Review   Appointment on 06/18/2024   Component Date Value Ref Range Status    WBC 06/18/2024 5.80  4.31 - 10.16 Thousand/uL Final    RBC 06/18/2024 4.94  3.81 - 5.12 Million/uL Final    Hemoglobin 06/18/2024 14.8  11.5 - 15.4 g/dL Final    Hematocrit 06/18/2024 47.0 (H)  34.8 - 46.1 % Final    MCV 06/18/2024 95  82 - 98 fL Final    MCH 06/18/2024 30.0  26.8 - 34.3 pg Final    MCHC 06/18/2024 31.5  31.4 - 37.4 g/dL Final    RDW 06/18/2024 13.2  11.6 - 15.1 % Final    MPV 06/18/2024 12.2  8.9 - 12.7 fL Final    Platelets 06/18/2024 250  149 - 390 Thousands/uL Final    nRBC 06/18/2024 0  /100 WBCs Final    Segmented % 06/18/2024 60  43 - " 75 % Final    Immature Grans % 06/18/2024 0  0 - 2 % Final    Lymphocytes % 06/18/2024 26  14 - 44 % Final    Monocytes % 06/18/2024 10  4 - 12 % Final    Eosinophils Relative 06/18/2024 3  0 - 6 % Final    Basophils Relative 06/18/2024 1  0 - 1 % Final    Absolute Neutrophils 06/18/2024 3.55  1.85 - 7.62 Thousands/µL Final    Absolute Immature Grans 06/18/2024 0.01  0.00 - 0.20 Thousand/uL Final    Absolute Lymphocytes 06/18/2024 1.51  0.60 - 4.47 Thousands/µL Final    Absolute Monocytes 06/18/2024 0.55  0.17 - 1.22 Thousand/µL Final    Eosinophils Absolute 06/18/2024 0.15  0.00 - 0.61 Thousand/µL Final    Basophils Absolute 06/18/2024 0.03  0.00 - 0.10 Thousands/µL Final    Sodium 06/18/2024 142  135 - 147 mmol/L Final    Potassium 06/18/2024 4.1  3.5 - 5.3 mmol/L Final    Chloride 06/18/2024 101  96 - 108 mmol/L Final    CO2 06/18/2024 33 (H)  21 - 32 mmol/L Final    ANION GAP 06/18/2024 8  4 - 13 mmol/L Final    BUN 06/18/2024 16  5 - 25 mg/dL Final    Creatinine 06/18/2024 0.97  0.60 - 1.30 mg/dL Final    Standardized to IDMS reference method    Glucose, Fasting 06/18/2024 102 (H)  65 - 99 mg/dL Final    Calcium 06/18/2024 9.3  8.4 - 10.2 mg/dL Final    AST 06/18/2024 17  13 - 39 U/L Final    ALT 06/18/2024 16  7 - 52 U/L Final    Specimen collection should occur prior to Sulfasalazine administration due to the potential for falsely depressed results.     Alkaline Phosphatase 06/18/2024 57  34 - 104 U/L Final    Total Protein 06/18/2024 6.9  6.4 - 8.4 g/dL Final    Albumin 06/18/2024 4.0  3.5 - 5.0 g/dL Final    Total Bilirubin 06/18/2024 0.48  0.20 - 1.00 mg/dL Final    Use of this assay is not recommended for patients undergoing treatment with eltrombopag due to the potential for falsely elevated results.  N-acetyl-p-benzoquinone imine (metabolite of Acetaminophen) will generate erroneously low results in samples for patients that have taken an overdose of Acetaminophen.    eGFR 06/18/2024 58  ml/min/1.73sq m  Final    Hemoglobin A1C 06/18/2024 5.8 (H)  Normal 4.0-5.6%; PreDiabetic 5.7-6.4%; Diabetic >=6.5%; Glycemic control for adults with diabetes <7.0% % Final    EAG 06/18/2024 120  mg/dl Final    Cholesterol 06/18/2024 180  See Comment mg/dL Final    Cholesterol:         Pediatric <18 Years        Desirable          <170 mg/dL      Borderline High    170-199 mg/dL      High               >=200 mg/dL        Adult >=18 Years            Desirable         <200 mg/dL      Borderline High   200-239 mg/dL      High              >239 mg/dL      Triglycerides 06/18/2024 113  See Comment mg/dL Final    Triglyceride:     0-9Y            <75mg/dL     10Y-17Y         <90 mg/dL       >=18Y     Normal          <150 mg/dL     Borderline High 150-199 mg/dL     High            200-499 mg/dL        Very High       >499 mg/dL    Specimen collection should occur prior to Metamizole administration due to the potential for falsely depressed results.    HDL, Direct 06/18/2024 55  >=50 mg/dL Final    LDL Calculated 06/18/2024 102 (H)  0 - 100 mg/dL Final    LDL Cholesterol:     Optimal           <100 mg/dl     Near Optimal      100-129 mg/dl     Above Optimal       Borderline High 130-159 mg/dl       High            160-189 mg/dl       Very High       >189 mg/dl         This screening LDL is a calculated result.   It does not have the accuracy of the Direct Measured LDL in the monitoring of patients with hyperlipidemia and/or statin therapy.   Direct Measure LDL (GNZ983) must be ordered separately in these patients.    Non-HDL-Chol (CHOL-HDL) 06/18/2024 125  mg/dl Final    TSH 3RD GENERATON 06/18/2024 0.145 (L)  0.450 - 4.500 uIU/mL Final    The recommended reference ranges for TSH during pregnancy are as follows:   First trimester 0.100 to 2.500 uIU/mL   Second trimester  0.200 to 3.000 uIU/mL   Third trimester 0.300 to 3.000 uIU/m    Note: Normal ranges may not apply to patients who are transgender, non-binary, or whose legal sex, sex at  "birth, and gender identity differ.  Adult TSH (3rd generation) reference range follows the recommended guidelines of the [employer], January, 2020.    Color, UA 06/18/2024 Yellow   Final    Clarity, UA 06/18/2024 Turbid   Final    Specific Gravity, UA 06/18/2024 1.018  1.003 - 1.030 Final    pH, UA 06/18/2024 6.5  4.5, 5.0, 5.5, 6.0, 6.5, 7.0, 7.5, 8.0 Final    Leukocytes, UA 06/18/2024 Large (A)  Negative Final    Nitrite, UA 06/18/2024 Negative  Negative Final    Protein, UA 06/18/2024 30 (1+) (A)  Negative mg/dl Final    Glucose, UA 06/18/2024 Negative  Negative mg/dl Final    Ketones, UA 06/18/2024 Negative  Negative mg/dl Final    Urobilinogen, UA 06/18/2024 <2.0  <2.0 mg/dl mg/dl Final    Bilirubin, UA 06/18/2024 Negative  Negative Final    Occult Blood, UA 06/18/2024 Small (A)  Negative Final    RBC, UA 06/18/2024 4-10 (A)  None Seen, 1-2 /hpf Final    WBC, UA 06/18/2024 10-20 (A)  None Seen, 1-2 /hpf Final    Epithelial Cells 06/18/2024 Moderate (A)  None Seen, Occasional /hpf Final    Bacteria, UA 06/18/2024 Occasional  None Seen, Occasional /hpf Final    MUCUS THREADS 06/18/2024 Occasional (A)  None Seen Final    Urine Culture 06/18/2024 >100,000 cfu/ml   Final    Mixed Contaminants X4    Free T4 06/18/2024 1.17 (H)  0.61 - 1.12 ng/dL Final    Specimens with biotin concentrations > 10 ng/mL can lead to significant (> 10%) positive bias in result.         Foreign Simeon MD        \"This note has been constructed using a voice recognition system.Therefore there may be syntax, spelling, and/or grammatical errors. Please call if you have any questions. \"  "

## 2024-06-24 ENCOUNTER — OFFICE VISIT (OUTPATIENT)
Dept: FAMILY MEDICINE CLINIC | Facility: CLINIC | Age: 74
End: 2024-06-24
Payer: MEDICARE

## 2024-06-24 VITALS
DIASTOLIC BLOOD PRESSURE: 78 MMHG | HEIGHT: 65 IN | WEIGHT: 226.4 LBS | HEART RATE: 82 BPM | SYSTOLIC BLOOD PRESSURE: 122 MMHG | BODY MASS INDEX: 37.72 KG/M2 | OXYGEN SATURATION: 93 %

## 2024-06-24 DIAGNOSIS — G89.4 CHRONIC PAIN SYNDROME: ICD-10-CM

## 2024-06-24 DIAGNOSIS — M79.7 FIBROMYALGIA: ICD-10-CM

## 2024-06-24 DIAGNOSIS — L60.8 NAIL DEFORMITY: ICD-10-CM

## 2024-06-24 DIAGNOSIS — K57.90 DIVERTICULOSIS: ICD-10-CM

## 2024-06-24 DIAGNOSIS — T78.40XD ALLERGY, SUBSEQUENT ENCOUNTER: ICD-10-CM

## 2024-06-24 DIAGNOSIS — R82.90 ABNORMAL FINDING ON URINALYSIS: Primary | ICD-10-CM

## 2024-06-24 DIAGNOSIS — K22.70 BARRETT'S ESOPHAGUS WITHOUT DYSPLASIA: ICD-10-CM

## 2024-06-24 DIAGNOSIS — K21.9 GASTROESOPHAGEAL REFLUX DISEASE WITHOUT ESOPHAGITIS: ICD-10-CM

## 2024-06-24 DIAGNOSIS — G43.909 MIGRAINE WITHOUT STATUS MIGRAINOSUS, NOT INTRACTABLE, UNSPECIFIED MIGRAINE TYPE: ICD-10-CM

## 2024-06-24 DIAGNOSIS — E66.9 OBESITY (BMI 35.0-39.9 WITHOUT COMORBIDITY): ICD-10-CM

## 2024-06-24 DIAGNOSIS — G40.909 SEIZURE DISORDER (HCC): ICD-10-CM

## 2024-06-24 DIAGNOSIS — F11.20 CONTINUOUS OPIOID DEPENDENCE (HCC): ICD-10-CM

## 2024-06-24 DIAGNOSIS — R31.9 HEMATURIA, UNSPECIFIED TYPE: ICD-10-CM

## 2024-06-24 DIAGNOSIS — E03.9 ACQUIRED HYPOTHYROIDISM: ICD-10-CM

## 2024-06-24 PROCEDURE — 99214 OFFICE O/P EST MOD 30 MIN: CPT | Performed by: INTERNAL MEDICINE

## 2024-06-24 PROCEDURE — G2211 COMPLEX E/M VISIT ADD ON: HCPCS | Performed by: INTERNAL MEDICINE

## 2024-06-24 RX ORDER — HYDROCODONE BITARTRATE AND ACETAMINOPHEN 5; 325 MG/1; MG/1
1 TABLET ORAL 2 TIMES DAILY PRN
Qty: 60 TABLET | Refills: 0 | Status: SHIPPED | OUTPATIENT
Start: 2024-06-24

## 2024-06-24 NOTE — ASSESSMENT & PLAN NOTE
Continue with levothyroxine 100 mcg daily TSH level came back abnormal slight increase in T4 at 1.17 and TSH at 0.145 we will repeat the test if it is still coming back low with TSH we will adjust the dosage of the levothyroxine to 88 mcg daily.  Patient had been on 100 mcg daily for a long time

## 2024-06-24 NOTE — ASSESSMENT & PLAN NOTE
Urinalysis showing 3+ blood bacteria and protein patient has no symptoms history of kidney stones many years back we will get a repeat urine analysis and culture done.  Also get ultrasound of the kidneys.

## 2024-06-24 NOTE — ASSESSMENT & PLAN NOTE
Patient is on hydrocodone we will continue the same without pain medication she not able to function.

## 2024-06-24 NOTE — ASSESSMENT & PLAN NOTE
Patient is on pantoprazole 40 mg daily will continue the same recommend to avoid spicy food keep the head end of the bed up as much as possible

## 2024-06-24 NOTE — ASSESSMENT & PLAN NOTE
Patient with chronic pain syndrome PDMP reviewed patient with right hip pain also recommend patient be seen by the orthopedics she is going to see the orthopedic doctor her son is following.  Movements of the right hip causing discomfort and pain

## 2024-07-16 NOTE — PROGRESS NOTES
Office Visit Note  24     Maryanne Marcos 74 y.o. female MRN: [id number]  : 1950    Assessment:     1. Gastroenteritis  Assessment & Plan:  Patient with nausea feeling diarrhea abdominal discomfort most likely gastroenteritis    Recommend patient to stay on clear liquid diet for 24 hours and then gradually start eating regular food.  Patient is taking Imodium today with some relief.  Will monitor  2. Chronic pain syndrome  Assessment & Plan:  Patient with chronic pain syndrome on pain medication PDMP reviewed.  Patient with a history of low back pain currently on hydrocodone 1 tablet 2 times daily as needed for severe pain we will continue with the same.  3. Acquired hypothyroidism  Assessment & Plan:  Continue levothyroxine 100 mcg daily last TSH level was 0.145  4. Seizure disorder (HCC)  Assessment & Plan:  Continue Keppra  5. Gastroesophageal reflux disease without esophagitis  Assessment & Plan:  Patient on pantoprazole continue  6. Allergy, subsequent encounter  7. Fibromyalgia  8. Continuous opioid dependence (HCC)  Assessment & Plan:  Patient is on hydrocodone for her low back pain continue the same side effects explained to the patient dependence also discussed  9. Abnormal finding on urinalysis  Assessment & Plan:  Patient is asymptomatic urinalysis showing 3+ blood bacteria and protein I have ordered ultrasound of the kidneys not done yet emphasized on getting the same  10. Carson's esophagus without dysplasia  Assessment & Plan:  Follow-up with the gastroenterologist    11. Obesity (BMI 35.0-39.9 without comorbidity)  Assessment & Plan:  Discussed with patient regarding lifestyle modification continue with low carbohydrate diet low-calorie diet lifestyle modification  12. Nail deformity  Assessment & Plan:  Patient has finished 1 month course of terbinafine she has a follow-up appointment with the podiatrist             Discussion Summary and Plan:  Today's care plan and medications were  reviewed with patient in detail and all their questions answered to their satisfaction.    Chief Complaint   Patient presents with    Follow-up    GI Problem      Subjective:  Patient is coming here for a follow-up evaluation with regards to symptoms of marcus abdominal discomfort feeling started yesterday nauseous feeling also but no vomiting.  No fever no cough congestion cold symptoms does have some mild body aches.  Patient also with chronic back pain on pain medications.  Medications reviewed labs reviewed patient had lab work done which I reviewed in the last visit I ordered a repeat urine analysis and also ultrasound of the kidneys because of the abnormalities noted on the urine.    Patient denies chest pain palpitation shortness of breath.        The following portions of the patient's history were reviewed and updated as appropriate: allergies, current medications, past family history, past medical history, past social history, past surgical history and problem list.    Review of Systems   Constitutional:  Negative for chills and fever.   HENT:  Negative for ear pain and sore throat.    Eyes:  Negative for pain and visual disturbance.   Respiratory:  Negative for cough and shortness of breath.    Cardiovascular:  Negative for chest pain and palpitations.   Gastrointestinal:  Positive for abdominal pain and diarrhea. Negative for vomiting.   Genitourinary:  Negative for dysuria and hematuria.   Musculoskeletal:  Negative for arthralgias and back pain.   Skin:  Negative for color change and rash.   Neurological:  Negative for seizures and syncope.   All other systems reviewed and are negative.        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    Nail deformity    Gastroenteritis     Past Medical History:   Diagnosis Date    Allergic rhinitis     Anxiety with depression     Arm fracture, left     x2    Arthritis     Callus     Degenerative joint disease     Disease of thyroid gland     Encounter for immunization 10/18/2022    Fibromyalgia     GERD (gastroesophageal reflux disease)     Hemorrhoids     Hypertension     Hypothyroidism     Kidney stones     Low back pain     Neurological disorder 1998    Migraine    Rheumatoid arthritis (HCC) 1999    Seasonal allergies     Seizure disorder (HCC)      Past Surgical History:   Procedure Laterality Date    COLONOSCOPY      DILATION AND CURETTAGE OF UTERUS      NECK SURGERY      Plate    ORIF FOREARM FRACTURE Left     SHOULDER SURGERY      x2    TOENAIL EXCISION      pinkie toes    TONSILLECTOMY       Social History     Substance and Sexual Activity   Alcohol Use Not Currently    Comment: glass of wine with dinner out, 1x a week stopped      Social History     Substance and Sexual Activity   Drug Use Never     Social History     Tobacco Use   Smoking Status Former    Current packs/day: 0.00    Average packs/day: 1 pack/day for 35.0 years (35.0 ttl pk-yrs)    Types: Cigarettes    Quit date:     Years since quittin.5    Passive exposure: Past   Smokeless Tobacco Never     Family History   Problem Relation Age of Onset    Stroke Mother     Arthritis Mother     Heart disease Father     Colon cancer Maternal Grandmother      Health Maintenance Due   Topic    Hepatitis C Screening     Breast Cancer Screening: Mammogram     Osteoporosis Screening     Zoster Vaccine (1 of 2)    RSV Vaccine Age 60+ Years (1 - 1-dose 60+ series)    Pneumococcal Vaccine: 65+ Years (1 of 1 - PCV)    COVID-19 Vaccine (3 - 2023-24 season)     "Influenza Vaccine (1)      Meds/Allergies       Current Outpatient Medications:     albuterol (PROVENTIL HFA,VENTOLIN HFA) 90 mcg/act inhaler, Inhale 2 puffs every 6 (six) hours as needed for wheezing, Disp: 8 g, Rfl: 1    Cholecalciferol (Vitamin D3) 50 MCG (2000 UT) TABS, Take 2,000 Units by mouth in the morning, Disp: , Rfl:     DULoxetine (CYMBALTA) 30 mg delayed release capsule, Take 1 capsule (30 mg total) by mouth every morning, Disp: 90 capsule, Rfl: 1    fluticasone (FLONASE) 50 mcg/act nasal spray, 2 sprays into each nostril daily, Disp: 32 g, Rfl: 3    HYDROcodone-acetaminophen (NORCO) 5-325 mg per tablet, Take 1 tablet by mouth 2 (two) times a day as needed for pain Max Daily Amount: 2 tablets, Disp: 60 tablet, Rfl: 0    levETIRAcetam (KEPPRA) 500 mg tablet, Take 1 tablet (500 mg total) by mouth 2 (two) times a day, Disp: 180 tablet, Rfl: 1    levothyroxine 100 mcg tablet, Take 1 tablet (100 mcg total) by mouth every morning, Disp: 90 tablet, Rfl: 1    Magnesium 400 MG TABS, Take 1 tablet (400 mg total) by mouth in the morning, Disp: 90 tablet, Rfl: 0    montelukast (SINGULAIR) 10 mg tablet, Take 1 tablet (10 mg total) by mouth every morning, Disp: 90 tablet, Rfl: 1    pantoprazole (PROTONIX) 40 mg tablet, Take 1 tablet (40 mg total) by mouth daily, Disp: 90 tablet, Rfl: 1    triamcinolone (KENALOG) 0.1 % cream, Apply topically 2 (two) times a day, Disp: 45 g, Rfl: 1    vitamin B-12 (VITAMIN B-12) 1,000 mcg tablet, Take 1 tablet (1,000 mcg total) by mouth daily, Disp: 90 tablet, Rfl: 1      Objective:    Vitals:   /68 (BP Location: Left arm, Patient Position: Sitting, Cuff Size: Standard)   Pulse 77   Ht 5' 5\" (1.651 m)   Wt 103 kg (226 lb 9.6 oz)   SpO2 95%   BMI 37.71 kg/m²   Body mass index is 37.71 kg/m².  Vitals:    07/17/24 1450   Weight: 103 kg (226 lb 9.6 oz)       Physical Exam  Vitals and nursing note reviewed.   Constitutional:       Appearance: Normal appearance.   Cardiovascular: "      Rate and Rhythm: Normal rate and regular rhythm.      Heart sounds: Normal heart sounds.   Pulmonary:      Effort: Pulmonary effort is normal.      Breath sounds: Normal breath sounds.   Abdominal:      General: Abdomen is flat.      Palpations: Abdomen is soft.   Musculoskeletal:         General: Normal range of motion.      Cervical back: Normal range of motion and neck supple.      Right lower leg: No edema.      Left lower leg: No edema.   Skin:     General: Skin is warm and dry.   Neurological:      General: No focal deficit present.      Mental Status: She is alert.   Psychiatric:         Mood and Affect: Mood normal.         Behavior: Behavior normal.         Lab Review   Appointment on 06/18/2024   Component Date Value Ref Range Status    WBC 06/18/2024 5.80  4.31 - 10.16 Thousand/uL Final    RBC 06/18/2024 4.94  3.81 - 5.12 Million/uL Final    Hemoglobin 06/18/2024 14.8  11.5 - 15.4 g/dL Final    Hematocrit 06/18/2024 47.0 (H)  34.8 - 46.1 % Final    MCV 06/18/2024 95  82 - 98 fL Final    MCH 06/18/2024 30.0  26.8 - 34.3 pg Final    MCHC 06/18/2024 31.5  31.4 - 37.4 g/dL Final    RDW 06/18/2024 13.2  11.6 - 15.1 % Final    MPV 06/18/2024 12.2  8.9 - 12.7 fL Final    Platelets 06/18/2024 250  149 - 390 Thousands/uL Final    nRBC 06/18/2024 0  /100 WBCs Final    Segmented % 06/18/2024 60  43 - 75 % Final    Immature Grans % 06/18/2024 0  0 - 2 % Final    Lymphocytes % 06/18/2024 26  14 - 44 % Final    Monocytes % 06/18/2024 10  4 - 12 % Final    Eosinophils Relative 06/18/2024 3  0 - 6 % Final    Basophils Relative 06/18/2024 1  0 - 1 % Final    Absolute Neutrophils 06/18/2024 3.55  1.85 - 7.62 Thousands/µL Final    Absolute Immature Grans 06/18/2024 0.01  0.00 - 0.20 Thousand/uL Final    Absolute Lymphocytes 06/18/2024 1.51  0.60 - 4.47 Thousands/µL Final    Absolute Monocytes 06/18/2024 0.55  0.17 - 1.22 Thousand/µL Final    Eosinophils Absolute 06/18/2024 0.15  0.00 - 0.61 Thousand/µL Final     Basophils Absolute 06/18/2024 0.03  0.00 - 0.10 Thousands/µL Final    Sodium 06/18/2024 142  135 - 147 mmol/L Final    Potassium 06/18/2024 4.1  3.5 - 5.3 mmol/L Final    Chloride 06/18/2024 101  96 - 108 mmol/L Final    CO2 06/18/2024 33 (H)  21 - 32 mmol/L Final    ANION GAP 06/18/2024 8  4 - 13 mmol/L Final    BUN 06/18/2024 16  5 - 25 mg/dL Final    Creatinine 06/18/2024 0.97  0.60 - 1.30 mg/dL Final    Standardized to IDMS reference method    Glucose, Fasting 06/18/2024 102 (H)  65 - 99 mg/dL Final    Calcium 06/18/2024 9.3  8.4 - 10.2 mg/dL Final    AST 06/18/2024 17  13 - 39 U/L Final    ALT 06/18/2024 16  7 - 52 U/L Final    Specimen collection should occur prior to Sulfasalazine administration due to the potential for falsely depressed results.     Alkaline Phosphatase 06/18/2024 57  34 - 104 U/L Final    Total Protein 06/18/2024 6.9  6.4 - 8.4 g/dL Final    Albumin 06/18/2024 4.0  3.5 - 5.0 g/dL Final    Total Bilirubin 06/18/2024 0.48  0.20 - 1.00 mg/dL Final    Use of this assay is not recommended for patients undergoing treatment with eltrombopag due to the potential for falsely elevated results.  N-acetyl-p-benzoquinone imine (metabolite of Acetaminophen) will generate erroneously low results in samples for patients that have taken an overdose of Acetaminophen.    eGFR 06/18/2024 58  ml/min/1.73sq m Final    Hemoglobin A1C 06/18/2024 5.8 (H)  Normal 4.0-5.6%; PreDiabetic 5.7-6.4%; Diabetic >=6.5%; Glycemic control for adults with diabetes <7.0% % Final    EAG 06/18/2024 120  mg/dl Final    Cholesterol 06/18/2024 180  See Comment mg/dL Final    Cholesterol:         Pediatric <18 Years        Desirable          <170 mg/dL      Borderline High    170-199 mg/dL      High               >=200 mg/dL        Adult >=18 Years            Desirable         <200 mg/dL      Borderline High   200-239 mg/dL      High              >239 mg/dL      Triglycerides 06/18/2024 113  See Comment mg/dL Final    Triglyceride:      0-9Y            <75mg/dL     10Y-17Y         <90 mg/dL       >=18Y     Normal          <150 mg/dL     Borderline High 150-199 mg/dL     High            200-499 mg/dL        Very High       >499 mg/dL    Specimen collection should occur prior to Metamizole administration due to the potential for falsely depressed results.    HDL, Direct 06/18/2024 55  >=50 mg/dL Final    LDL Calculated 06/18/2024 102 (H)  0 - 100 mg/dL Final    LDL Cholesterol:     Optimal           <100 mg/dl     Near Optimal      100-129 mg/dl     Above Optimal       Borderline High 130-159 mg/dl       High            160-189 mg/dl       Very High       >189 mg/dl         This screening LDL is a calculated result.   It does not have the accuracy of the Direct Measured LDL in the monitoring of patients with hyperlipidemia and/or statin therapy.   Direct Measure LDL (ENP622) must be ordered separately in these patients.    Non-HDL-Chol (CHOL-HDL) 06/18/2024 125  mg/dl Final    TSH 3RD GENERATON 06/18/2024 0.145 (L)  0.450 - 4.500 uIU/mL Final    The recommended reference ranges for TSH during pregnancy are as follows:   First trimester 0.100 to 2.500 uIU/mL   Second trimester  0.200 to 3.000 uIU/mL   Third trimester 0.300 to 3.000 uIU/m    Note: Normal ranges may not apply to patients who are transgender, non-binary, or whose legal sex, sex at birth, and gender identity differ.  Adult TSH (3rd generation) reference range follows the recommended guidelines of the [employer], January, 2020.    Color, UA 06/18/2024 Yellow   Final    Clarity, UA 06/18/2024 Turbid   Final    Specific Gravity, UA 06/18/2024 1.018  1.003 - 1.030 Final    pH, UA 06/18/2024 6.5  4.5, 5.0, 5.5, 6.0, 6.5, 7.0, 7.5, 8.0 Final    Leukocytes, UA 06/18/2024 Large (A)  Negative Final    Nitrite, UA 06/18/2024 Negative  Negative Final    Protein, UA 06/18/2024 30 (1+) (A)  Negative mg/dl Final    Glucose, UA 06/18/2024 Negative  Negative mg/dl Final    Ketones, UA  "06/18/2024 Negative  Negative mg/dl Final    Urobilinogen, UA 06/18/2024 <2.0  <2.0 mg/dl mg/dl Final    Bilirubin, UA 06/18/2024 Negative  Negative Final    Occult Blood, UA 06/18/2024 Small (A)  Negative Final    RBC, UA 06/18/2024 4-10 (A)  None Seen, 1-2 /hpf Final    WBC, UA 06/18/2024 10-20 (A)  None Seen, 1-2 /hpf Final    Epithelial Cells 06/18/2024 Moderate (A)  None Seen, Occasional /hpf Final    Bacteria, UA 06/18/2024 Occasional  None Seen, Occasional /hpf Final    MUCUS THREADS 06/18/2024 Occasional (A)  None Seen Final    Urine Culture 06/18/2024 >100,000 cfu/ml   Final    Mixed Contaminants X4    Free T4 06/18/2024 1.17 (H)  0.61 - 1.12 ng/dL Final    Specimens with biotin concentrations > 10 ng/mL can lead to significant (> 10%) positive bias in result.         Foreign Simeon MD        \"This note has been constructed using a voice recognition system.Therefore there may be syntax, spelling, and/or grammatical errors. Please call if you have any questions. \"  "

## 2024-07-17 ENCOUNTER — OFFICE VISIT (OUTPATIENT)
Dept: FAMILY MEDICINE CLINIC | Facility: CLINIC | Age: 74
End: 2024-07-17
Payer: MEDICARE

## 2024-07-17 VITALS
SYSTOLIC BLOOD PRESSURE: 118 MMHG | DIASTOLIC BLOOD PRESSURE: 68 MMHG | BODY MASS INDEX: 37.75 KG/M2 | HEART RATE: 77 BPM | OXYGEN SATURATION: 95 % | HEIGHT: 65 IN | WEIGHT: 226.6 LBS

## 2024-07-17 DIAGNOSIS — G40.909 SEIZURE DISORDER (HCC): ICD-10-CM

## 2024-07-17 DIAGNOSIS — G89.4 CHRONIC PAIN SYNDROME: ICD-10-CM

## 2024-07-17 DIAGNOSIS — L60.8 NAIL DEFORMITY: ICD-10-CM

## 2024-07-17 DIAGNOSIS — M79.7 FIBROMYALGIA: ICD-10-CM

## 2024-07-17 DIAGNOSIS — E03.9 ACQUIRED HYPOTHYROIDISM: ICD-10-CM

## 2024-07-17 DIAGNOSIS — E66.9 OBESITY (BMI 35.0-39.9 WITHOUT COMORBIDITY): ICD-10-CM

## 2024-07-17 DIAGNOSIS — K52.9 GASTROENTERITIS: Primary | ICD-10-CM

## 2024-07-17 DIAGNOSIS — K22.70 BARRETT'S ESOPHAGUS WITHOUT DYSPLASIA: ICD-10-CM

## 2024-07-17 DIAGNOSIS — F11.20 CONTINUOUS OPIOID DEPENDENCE (HCC): ICD-10-CM

## 2024-07-17 DIAGNOSIS — R82.90 ABNORMAL FINDING ON URINALYSIS: ICD-10-CM

## 2024-07-17 DIAGNOSIS — T78.40XD ALLERGY, SUBSEQUENT ENCOUNTER: ICD-10-CM

## 2024-07-17 DIAGNOSIS — K21.9 GASTROESOPHAGEAL REFLUX DISEASE WITHOUT ESOPHAGITIS: ICD-10-CM

## 2024-07-17 PROCEDURE — 99214 OFFICE O/P EST MOD 30 MIN: CPT | Performed by: INTERNAL MEDICINE

## 2024-07-17 PROCEDURE — G2211 COMPLEX E/M VISIT ADD ON: HCPCS | Performed by: INTERNAL MEDICINE

## 2024-07-17 RX ORDER — HYDROCODONE BITARTRATE AND ACETAMINOPHEN 5; 325 MG/1; MG/1
1 TABLET ORAL 2 TIMES DAILY PRN
Qty: 60 TABLET | Refills: 0 | Status: SHIPPED | OUTPATIENT
Start: 2024-07-17

## 2024-07-17 NOTE — ASSESSMENT & PLAN NOTE
Patient is on hydrocodone for her low back pain continue the same side effects explained to the patient dependence also discussed

## 2024-07-17 NOTE — ASSESSMENT & PLAN NOTE
Patient with chronic pain syndrome on pain medication PDMP reviewed.  Patient with a history of low back pain currently on hydrocodone 1 tablet 2 times daily as needed for severe pain we will continue with the same.

## 2024-07-17 NOTE — ASSESSMENT & PLAN NOTE
Patient with nausea feeling diarrhea abdominal discomfort most likely gastroenteritis    Recommend patient to stay on clear liquid diet for 24 hours and then gradually start eating regular food.  Patient is taking Imodium today with some relief.  Will monitor

## 2024-07-17 NOTE — ASSESSMENT & PLAN NOTE
Discussed with patient regarding lifestyle modification continue with low carbohydrate diet low-calorie diet lifestyle modification

## 2024-07-17 NOTE — ASSESSMENT & PLAN NOTE
Patient has finished 1 month course of terbinafine she has a follow-up appointment with the podiatrist

## 2024-07-17 NOTE — ASSESSMENT & PLAN NOTE
Patient is asymptomatic urinalysis showing 3+ blood bacteria and protein I have ordered ultrasound of the kidneys not done yet emphasized on getting the same

## 2024-08-06 ENCOUNTER — OFFICE VISIT (OUTPATIENT)
Age: 74
End: 2024-08-06
Payer: MEDICARE

## 2024-08-06 VITALS
RESPIRATION RATE: 17 BRPM | HEART RATE: 80 BPM | HEIGHT: 65 IN | BODY MASS INDEX: 37.65 KG/M2 | SYSTOLIC BLOOD PRESSURE: 121 MMHG | DIASTOLIC BLOOD PRESSURE: 80 MMHG | WEIGHT: 226 LBS

## 2024-08-06 DIAGNOSIS — B35.1 ONYCHOMYCOSIS: ICD-10-CM

## 2024-08-06 DIAGNOSIS — L03.031 PARONYCHIA OF TOENAIL OF RIGHT FOOT: ICD-10-CM

## 2024-08-06 DIAGNOSIS — M79.671 PAIN IN BOTH FEET: Primary | ICD-10-CM

## 2024-08-06 DIAGNOSIS — M79.672 PAIN IN BOTH FEET: Primary | ICD-10-CM

## 2024-08-06 PROCEDURE — 99213 OFFICE O/P EST LOW 20 MIN: CPT | Performed by: PODIATRIST

## 2024-08-06 RX ORDER — TERBINAFINE HYDROCHLORIDE 250 MG/1
250 TABLET ORAL DAILY
Qty: 30 TABLET | Refills: 0 | Status: SHIPPED | OUTPATIENT
Start: 2024-08-06 | End: 2024-09-05

## 2024-08-06 NOTE — PROGRESS NOTES
Assessment/Plan: Pain upon ambulation.  Mycosis of hallux nail bilateral.  Paronychia right hallux.     Plan.  Chart reviewed.  PCP notes reviewed.  Lab work reviewed.  Patient examined.  At this time we will treat patient for fungus.  She will be started on oral terbinafine.  She has no elevation in liver function test/enzymes.  In addition she will take vitamin B 5.  She will spray her shoes with Lysol.  Aftercare instruction given.  Return for follow-up.              Assessment  Diagnoses and all orders for this visit:     Pain in both feet     Nail deformity     Onychomycosis  -     terbinafine (LamISIL) 250 mg tablet; Take 1 tablet (250 mg total) by mouth daily     Paronychia of toenail of right foot              Subjective: Patient is seen in referral for evaluation and treatment of dystrophic discolored toenails.  No history of trauma          Allergies   Allergen Reactions    Beta Adrenergic Blockers Shortness Of Breath    Sulfamethoxazole-Trimethoprim Shortness Of Breath    Meloxicam Confusion           Current Medications      Current Outpatient Medications:     albuterol (PROVENTIL HFA,VENTOLIN HFA) 90 mcg/act inhaler, Inhale 2 puffs every 6 (six) hours as needed for wheezing, Disp: 8 g, Rfl: 1    Cholecalciferol (Vitamin D3) 50 MCG (2000 UT) TABS, Take 2,000 Units by mouth in the morning, Disp: , Rfl:     DULoxetine (CYMBALTA) 30 mg delayed release capsule, Take 1 capsule (30 mg total) by mouth every morning, Disp: 90 capsule, Rfl: 1    fluticasone (FLONASE) 50 mcg/act nasal spray, 2 sprays into each nostril daily, Disp: 32 g, Rfl: 3    HYDROcodone-acetaminophen (NORCO) 5-325 mg per tablet, Take 1 tablet by mouth 2 (two) times a day as needed for pain Max Daily Amount: 2 tablets, Disp: 60 tablet, Rfl: 0    levETIRAcetam (KEPPRA) 500 mg tablet, Take 1 tablet (500 mg total) by mouth 2 (two) times a day, Disp: 180 tablet, Rfl: 1    levothyroxine 100 mcg tablet, Take 1 tablet (100 mcg total) by mouth every  morning, Disp: 90 tablet, Rfl: 1    Magnesium 400 MG TABS, Take 1 tablet (400 mg total) by mouth in the morning, Disp: 90 tablet, Rfl: 0    montelukast (SINGULAIR) 10 mg tablet, Take 1 tablet (10 mg total) by mouth every morning, Disp: 90 tablet, Rfl: 1    pantoprazole (PROTONIX) 40 mg tablet, Take 1 tablet (40 mg total) by mouth daily, Disp: 90 tablet, Rfl: 1    terbinafine (LamISIL) 250 mg tablet, Take 1 tablet (250 mg total) by mouth daily, Disp: 30 tablet, Rfl: 0    triamcinolone (KENALOG) 0.1 % cream, Apply topically 2 (two) times a day, Disp: 45 g, Rfl: 1    vitamin B-12 (VITAMIN B-12) 1,000 mcg tablet, Take 1 tablet (1,000 mcg total) by mouth daily, Disp: 90 tablet, Rfl: 1        Problem List       Patient Active Problem List   Diagnosis    Chronic pain syndrome    Acquired hypothyroidism    Seizure disorder (HCC)    Gastroesophageal reflux disease without esophagitis    Allergies    Fibromyalgia    Migraines    Continuous opioid dependence (HCC)    Abnormal finding on urinalysis    Carson's esophagus without dysplasia    Diverticulosis    Hx of colonic polyp    Obesity (BMI 35.0-39.9 without comorbidity)    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 female.     HPI     The following portions of the patient's history were reviewed and updated as appropriate:      family history includes Arthritis in her mother; Colon cancer in her maternal grandmother; Heart disease in her father; Stroke in her mother.       reports that she quit smoking about 34 years ago. Her smoking use included cigarettes. She has a 35 pack-year smoking history. She has been exposed to tobacco smoke. She has never used smokeless tobacco. She reports that she does not currently use alcohol. She reports that she does not use drugs.           Objective:  Patient's shoes and socks removed.     Objective[]Expand by Default  Foot ExamPhysical Exam  Vitals  and nursing note reviewed.   Constitutional:       Appearance: Normal appearance.   Cardiovascular:      Rate and Rhythm: Normal rate and regular rhythm.   Skin:     Capillary Refill: Capillary refill takes less than 2 seconds.      Comments: All nails are dystrophic.  Hallux bilateral demonstrates distal mycosis with secondary lysis of nail.  Right hallux has paronychia.  Negative pus.   Neurological:      Mental Status: She is alert.   Psychiatric:         Mood and Affect: Mood normal.         Behavior: Behavior normal.         Thought Content: Thought content normal.         Judgment: Judgment normal.

## 2024-08-11 DIAGNOSIS — G89.4 CHRONIC PAIN SYNDROME: ICD-10-CM

## 2024-08-16 PROBLEM — K52.9 GASTROENTERITIS: Status: RESOLVED | Noted: 2024-07-17 | Resolved: 2024-08-16

## 2024-08-16 RX ORDER — HYDROCODONE BITARTRATE AND ACETAMINOPHEN 5; 325 MG/1; MG/1
1 TABLET ORAL 2 TIMES DAILY PRN
Qty: 60 TABLET | Refills: 0 | Status: SHIPPED | OUTPATIENT
Start: 2024-08-16

## 2024-08-26 ENCOUNTER — OFFICE VISIT (OUTPATIENT)
Dept: FAMILY MEDICINE CLINIC | Facility: CLINIC | Age: 74
End: 2024-08-26
Payer: MEDICARE

## 2024-08-26 VITALS
SYSTOLIC BLOOD PRESSURE: 124 MMHG | TEMPERATURE: 98.1 F | DIASTOLIC BLOOD PRESSURE: 72 MMHG | HEIGHT: 65 IN | RESPIRATION RATE: 16 BRPM | WEIGHT: 230 LBS | OXYGEN SATURATION: 97 % | BODY MASS INDEX: 38.32 KG/M2 | HEART RATE: 67 BPM

## 2024-08-26 DIAGNOSIS — K21.9 GASTROESOPHAGEAL REFLUX DISEASE WITHOUT ESOPHAGITIS: ICD-10-CM

## 2024-08-26 DIAGNOSIS — E03.9 ACQUIRED HYPOTHYROIDISM: ICD-10-CM

## 2024-08-26 DIAGNOSIS — G89.4 CHRONIC PAIN SYNDROME: ICD-10-CM

## 2024-08-26 DIAGNOSIS — G89.29 CHRONIC BILATERAL LOW BACK PAIN WITHOUT SCIATICA: Primary | ICD-10-CM

## 2024-08-26 DIAGNOSIS — G40.909 SEIZURE DISORDER (HCC): ICD-10-CM

## 2024-08-26 DIAGNOSIS — M54.50 CHRONIC BILATERAL LOW BACK PAIN WITHOUT SCIATICA: Primary | ICD-10-CM

## 2024-08-26 DIAGNOSIS — M79.7 FIBROMYALGIA: ICD-10-CM

## 2024-08-26 DIAGNOSIS — E66.9 OBESITY (BMI 35.0-39.9 WITHOUT COMORBIDITY): ICD-10-CM

## 2024-08-26 DIAGNOSIS — K22.70 BARRETT'S ESOPHAGUS WITHOUT DYSPLASIA: ICD-10-CM

## 2024-08-26 DIAGNOSIS — T78.40XD ALLERGY, SUBSEQUENT ENCOUNTER: ICD-10-CM

## 2024-08-26 PROCEDURE — 99214 OFFICE O/P EST MOD 30 MIN: CPT | Performed by: INTERNAL MEDICINE

## 2024-08-26 PROCEDURE — G2211 COMPLEX E/M VISIT ADD ON: HCPCS | Performed by: INTERNAL MEDICINE

## 2024-08-26 RX ORDER — FLUTICASONE PROPIONATE 50 MCG
2 SPRAY, SUSPENSION (ML) NASAL DAILY
Qty: 32 G | Refills: 3 | Status: SHIPPED | OUTPATIENT
Start: 2024-08-26 | End: 2024-08-26 | Stop reason: ALTCHOICE

## 2024-08-26 RX ORDER — IPRATROPIUM BROMIDE 21 UG/1
2 SPRAY, METERED NASAL EVERY 12 HOURS
Qty: 30 ML | Refills: 2 | Status: SHIPPED | OUTPATIENT
Start: 2024-08-26

## 2024-08-26 RX ORDER — HYDROCODONE BITARTRATE AND ACETAMINOPHEN 5; 325 MG/1; MG/1
1 TABLET ORAL 2 TIMES DAILY PRN
Qty: 60 TABLET | Refills: 0 | Status: CANCELLED | OUTPATIENT
Start: 2024-08-26

## 2024-08-26 NOTE — PROGRESS NOTES
Office Visit Note  24     Maryanne Marcos 74 y.o. female MRN: [id number]  : 1950    Assessment:     1. Chronic bilateral low back pain without sciatica  Assessment & Plan:  Patient with chronic low back pain chronic pain syndrome has been noticing increasing discomfort pain in the recent past was seen in the past by the spine physician will refer her back again in the past she had received steroid injections also meanwhile we will continue her pain medications and renew them.  2. Allergy, subsequent encounter  Assessment & Plan:  Patient with a history of allergies on Singulair and Flonase but she is also noticing increasing postnasal dripping after eating will change the Flonase to Atrovent nasal spray and see if it makes any difference meanwhile she will continue the Singulair.  Orders:  -     fluticasone (FLONASE) 50 mcg/act nasal spray; 2 sprays into each nostril daily  -     ipratropium (ATROVENT) 0.03 % nasal spray; 2 sprays into each nostril every 12 (twelve) hours  3. Acquired hypothyroidism  Assessment & Plan:  Currently patient is taking levothyroxine 100 mcg daily last TSH level was slightly low I have ordered a repeat when she is going to get it done soon we can adjust the dosage of the same.  4. Chronic pain syndrome  Assessment & Plan:  Patient's PDMP reviewed history of low back pain on hydrocodone 1 tablet twice daily as needed for severe pain we will continue with the same will also refer the patient back to the spine and pain management physician  5. Fibromyalgia  Assessment & Plan:  Continue Cymbalta  6. Gastroesophageal reflux disease without esophagitis  7. Carson's esophagus without dysplasia  Assessment & Plan:  Follow-up with a gastroenterologist    8. Obesity (BMI 35.0-39.9 without comorbidity)  Assessment & Plan:  Continue with lifestyle modification low carbohydrate diet lifestyle changes lose weight  9. Seizure disorder (HCC)  Assessment & Plan:  Patient is on Keppra we  will continue the same             Discussion Summary and Plan:  Today's care plan and medications were reviewed with patient in detail and all their questions answered to their satisfaction.    Chief Complaint   Patient presents with    Follow-up     Increased pain (lower back, legs, arms) last 24 hours has been 9/10 at it's worst      Subjective:  Patient complains of increasing pain in the low back area symptoms into the right buttock area patient is on pain medications on a daily basis she was seen in the past by the orthopedics and the spine physician.  Patient has received steroid injections in the past.  Medications reviewed labs reviewed.    Patient complains of runny nose especially after eating.        The following portions of the patient's history were reviewed and updated as appropriate: allergies, current medications, past family history, past medical history, past social history, past surgical history and problem list.    Review of Systems   Constitutional:  Negative for chills and fever.   HENT:  Negative for ear pain and sore throat.    Eyes:  Negative for pain and visual disturbance.   Respiratory:  Negative for cough and shortness of breath.    Cardiovascular:  Negative for chest pain and palpitations.   Gastrointestinal:  Negative for abdominal pain and vomiting.   Genitourinary:  Negative for dysuria and hematuria.   Musculoskeletal:  Positive for arthralgias and back pain.   Skin:  Negative for color change and rash.   Neurological:  Negative for seizures and syncope.   All other systems reviewed and are negative.        Historical Information   Patient Active Problem List   Diagnosis    Chronic pain syndrome    Acquired hypothyroidism    Seizure disorder (HCC)    Gastroesophageal reflux disease without esophagitis    Allergies    Fibromyalgia    Migraines    Continuous opioid dependence (HCC)    Abnormal finding on urinalysis    Carson's esophagus without dysplasia    Diverticulosis    Hx of  colonic polyp    Obesity (BMI 35.0-39.9 without comorbidity)    Acquired mallet deformity of right ring finger    Nail deformity    Chronic bilateral low back pain without sciatica     Past Medical History:   Diagnosis Date    Allergic rhinitis     Anxiety with depression     Arm fracture, left     x2    Arthritis     Callus     Degenerative joint disease     Disease of thyroid gland     Encounter for immunization 10/18/2022    Fibromyalgia     GERD (gastroesophageal reflux disease)     Hemorrhoids     Hypertension     Hypothyroidism     Kidney stones     Low back pain     Neurological disorder 1998    Migraine    Rheumatoid arthritis (HCC) 1999    Seasonal allergies     Seizure disorder (HCC)      Past Surgical History:   Procedure Laterality Date    COLONOSCOPY      DILATION AND CURETTAGE OF UTERUS      NECK SURGERY      Plate    ORIF FOREARM FRACTURE Left     SHOULDER SURGERY      x2    TOENAIL EXCISION      pinkie toes    TONSILLECTOMY       Social History     Substance and Sexual Activity   Alcohol Use Not Currently    Comment: glass of wine with dinner out, 1x a week stopped      Social History     Substance and Sexual Activity   Drug Use Never     Social History     Tobacco Use   Smoking Status Former    Current packs/day: 0.00    Average packs/day: 1 pack/day for 35.0 years (35.0 ttl pk-yrs)    Types: Cigarettes    Quit date:     Years since quittin.6    Passive exposure: Past   Smokeless Tobacco Never     Family History   Problem Relation Age of Onset    Stroke Mother     Arthritis Mother     Heart disease Father     Colon cancer Maternal Grandmother      Health Maintenance Due   Topic    Hepatitis C Screening     Breast Cancer Screening: Mammogram     Osteoporosis Screening     Zoster Vaccine (1 of 2)    RSV Vaccine Age 60+ Years (1 - 1-dose 60+ series)    Pneumococcal Vaccine: 65+ Years (1 of 1 - PCV)    COVID-19 Vaccine (3 - 2023-24 season)    Influenza Vaccine (1)     "  Meds/Allergies       Current Outpatient Medications:     albuterol (PROVENTIL HFA,VENTOLIN HFA) 90 mcg/act inhaler, Inhale 2 puffs every 6 (six) hours as needed for wheezing, Disp: 8 g, Rfl: 1    Cholecalciferol (Vitamin D3) 50 MCG (2000 UT) TABS, Take 2,000 Units by mouth in the morning, Disp: , Rfl:     DULoxetine (CYMBALTA) 30 mg delayed release capsule, Take 1 capsule (30 mg total) by mouth every morning, Disp: 90 capsule, Rfl: 1    fluticasone (FLONASE) 50 mcg/act nasal spray, 2 sprays into each nostril daily, Disp: 32 g, Rfl: 3    HYDROcodone-acetaminophen (NORCO) 5-325 mg per tablet, Take 1 tablet by mouth 2 (two) times a day as needed for pain Max Daily Amount: 2 tablets, Disp: 60 tablet, Rfl: 0    ipratropium (ATROVENT) 0.03 % nasal spray, 2 sprays into each nostril every 12 (twelve) hours, Disp: 30 mL, Rfl: 2    levETIRAcetam (KEPPRA) 500 mg tablet, Take 1 tablet (500 mg total) by mouth 2 (two) times a day, Disp: 180 tablet, Rfl: 1    levothyroxine 100 mcg tablet, Take 1 tablet (100 mcg total) by mouth every morning, Disp: 90 tablet, Rfl: 1    Magnesium 400 MG TABS, Take 1 tablet (400 mg total) by mouth in the morning, Disp: 90 tablet, Rfl: 0    montelukast (SINGULAIR) 10 mg tablet, Take 1 tablet (10 mg total) by mouth every morning, Disp: 90 tablet, Rfl: 1    pantoprazole (PROTONIX) 40 mg tablet, Take 1 tablet (40 mg total) by mouth daily, Disp: 90 tablet, Rfl: 1    terbinafine (LamISIL) 250 mg tablet, Take 1 tablet (250 mg total) by mouth daily, Disp: 30 tablet, Rfl: 0    triamcinolone (KENALOG) 0.1 % cream, Apply topically 2 (two) times a day, Disp: 45 g, Rfl: 1    vitamin B-12 (VITAMIN B-12) 1,000 mcg tablet, Take 1 tablet (1,000 mcg total) by mouth daily, Disp: 90 tablet, Rfl: 1      Objective:    Vitals:   /72   Pulse 67   Temp 98.1 °F (36.7 °C)   Resp 16   Ht 5' 5\" (1.651 m)   Wt 104 kg (230 lb)   SpO2 97%   BMI 38.27 kg/m²   Body mass index is 38.27 kg/m².  Vitals:    08/26/24 1144 "   Weight: 104 kg (230 lb)       Physical Exam  Vitals and nursing note reviewed.   Constitutional:       Appearance: Normal appearance.   Cardiovascular:      Rate and Rhythm: Normal rate and regular rhythm.      Heart sounds: Normal heart sounds.   Pulmonary:      Effort: Pulmonary effort is normal.      Breath sounds: Normal breath sounds.   Abdominal:      Palpations: Abdomen is soft.   Musculoskeletal:      Cervical back: Normal range of motion and neck supple.      Right lower leg: No edema.      Left lower leg: No edema.      Comments: SLR about 30 to 40 degrees on both sides   Skin:     General: Skin is warm and dry.   Neurological:      Mental Status: She is alert and oriented to person, place, and time.   Psychiatric:         Mood and Affect: Mood normal.         Behavior: Behavior normal.         Lab Review   No visits with results within 2 Month(s) from this visit.   Latest known visit with results is:   Appointment on 06/18/2024   Component Date Value Ref Range Status    WBC 06/18/2024 5.80  4.31 - 10.16 Thousand/uL Final    RBC 06/18/2024 4.94  3.81 - 5.12 Million/uL Final    Hemoglobin 06/18/2024 14.8  11.5 - 15.4 g/dL Final    Hematocrit 06/18/2024 47.0 (H)  34.8 - 46.1 % Final    MCV 06/18/2024 95  82 - 98 fL Final    MCH 06/18/2024 30.0  26.8 - 34.3 pg Final    MCHC 06/18/2024 31.5  31.4 - 37.4 g/dL Final    RDW 06/18/2024 13.2  11.6 - 15.1 % Final    MPV 06/18/2024 12.2  8.9 - 12.7 fL Final    Platelets 06/18/2024 250  149 - 390 Thousands/uL Final    nRBC 06/18/2024 0  /100 WBCs Final    Segmented % 06/18/2024 60  43 - 75 % Final    Immature Grans % 06/18/2024 0  0 - 2 % Final    Lymphocytes % 06/18/2024 26  14 - 44 % Final    Monocytes % 06/18/2024 10  4 - 12 % Final    Eosinophils Relative 06/18/2024 3  0 - 6 % Final    Basophils Relative 06/18/2024 1  0 - 1 % Final    Absolute Neutrophils 06/18/2024 3.55  1.85 - 7.62 Thousands/µL Final    Absolute Immature Grans 06/18/2024 0.01  0.00 - 0.20  Thousand/uL Final    Absolute Lymphocytes 06/18/2024 1.51  0.60 - 4.47 Thousands/µL Final    Absolute Monocytes 06/18/2024 0.55  0.17 - 1.22 Thousand/µL Final    Eosinophils Absolute 06/18/2024 0.15  0.00 - 0.61 Thousand/µL Final    Basophils Absolute 06/18/2024 0.03  0.00 - 0.10 Thousands/µL Final    Sodium 06/18/2024 142  135 - 147 mmol/L Final    Potassium 06/18/2024 4.1  3.5 - 5.3 mmol/L Final    Chloride 06/18/2024 101  96 - 108 mmol/L Final    CO2 06/18/2024 33 (H)  21 - 32 mmol/L Final    ANION GAP 06/18/2024 8  4 - 13 mmol/L Final    BUN 06/18/2024 16  5 - 25 mg/dL Final    Creatinine 06/18/2024 0.97  0.60 - 1.30 mg/dL Final    Standardized to IDMS reference method    Glucose, Fasting 06/18/2024 102 (H)  65 - 99 mg/dL Final    Calcium 06/18/2024 9.3  8.4 - 10.2 mg/dL Final    AST 06/18/2024 17  13 - 39 U/L Final    ALT 06/18/2024 16  7 - 52 U/L Final    Specimen collection should occur prior to Sulfasalazine administration due to the potential for falsely depressed results.     Alkaline Phosphatase 06/18/2024 57  34 - 104 U/L Final    Total Protein 06/18/2024 6.9  6.4 - 8.4 g/dL Final    Albumin 06/18/2024 4.0  3.5 - 5.0 g/dL Final    Total Bilirubin 06/18/2024 0.48  0.20 - 1.00 mg/dL Final    Use of this assay is not recommended for patients undergoing treatment with eltrombopag due to the potential for falsely elevated results.  N-acetyl-p-benzoquinone imine (metabolite of Acetaminophen) will generate erroneously low results in samples for patients that have taken an overdose of Acetaminophen.    eGFR 06/18/2024 58  ml/min/1.73sq m Final    Hemoglobin A1C 06/18/2024 5.8 (H)  Normal 4.0-5.6%; PreDiabetic 5.7-6.4%; Diabetic >=6.5%; Glycemic control for adults with diabetes <7.0% % Final    EAG 06/18/2024 120  mg/dl Final    Cholesterol 06/18/2024 180  See Comment mg/dL Final    Cholesterol:         Pediatric <18 Years        Desirable          <170 mg/dL      Borderline High    170-199 mg/dL      High                >=200 mg/dL        Adult >=18 Years            Desirable         <200 mg/dL      Borderline High   200-239 mg/dL      High              >239 mg/dL      Triglycerides 06/18/2024 113  See Comment mg/dL Final    Triglyceride:     0-9Y            <75mg/dL     10Y-17Y         <90 mg/dL       >=18Y     Normal          <150 mg/dL     Borderline High 150-199 mg/dL     High            200-499 mg/dL        Very High       >499 mg/dL    Specimen collection should occur prior to Metamizole administration due to the potential for falsely depressed results.    HDL, Direct 06/18/2024 55  >=50 mg/dL Final    LDL Calculated 06/18/2024 102 (H)  0 - 100 mg/dL Final    LDL Cholesterol:     Optimal           <100 mg/dl     Near Optimal      100-129 mg/dl     Above Optimal       Borderline High 130-159 mg/dl       High            160-189 mg/dl       Very High       >189 mg/dl         This screening LDL is a calculated result.   It does not have the accuracy of the Direct Measured LDL in the monitoring of patients with hyperlipidemia and/or statin therapy.   Direct Measure LDL (SXD074) must be ordered separately in these patients.    Non-HDL-Chol (CHOL-HDL) 06/18/2024 125  mg/dl Final    TSH 3RD GENERATON 06/18/2024 0.145 (L)  0.450 - 4.500 uIU/mL Final    The recommended reference ranges for TSH during pregnancy are as follows:   First trimester 0.100 to 2.500 uIU/mL   Second trimester  0.200 to 3.000 uIU/mL   Third trimester 0.300 to 3.000 uIU/m    Note: Normal ranges may not apply to patients who are transgender, non-binary, or whose legal sex, sex at birth, and gender identity differ.  Adult TSH (3rd generation) reference range follows the recommended guidelines of the [employer], January, 2020.    Color, UA 06/18/2024 Yellow   Final    Clarity, UA 06/18/2024 Turbid   Final    Specific Gravity, UA 06/18/2024 1.018  1.003 - 1.030 Final    pH, UA 06/18/2024 6.5  4.5, 5.0, 5.5, 6.0, 6.5, 7.0, 7.5, 8.0 Final     "Leukocytes, UA 06/18/2024 Large (A)  Negative Final    Nitrite, UA 06/18/2024 Negative  Negative Final    Protein, UA 06/18/2024 30 (1+) (A)  Negative mg/dl Final    Glucose, UA 06/18/2024 Negative  Negative mg/dl Final    Ketones, UA 06/18/2024 Negative  Negative mg/dl Final    Urobilinogen, UA 06/18/2024 <2.0  <2.0 mg/dl mg/dl Final    Bilirubin, UA 06/18/2024 Negative  Negative Final    Occult Blood, UA 06/18/2024 Small (A)  Negative Final    RBC, UA 06/18/2024 4-10 (A)  None Seen, 1-2 /hpf Final    WBC, UA 06/18/2024 10-20 (A)  None Seen, 1-2 /hpf Final    Epithelial Cells 06/18/2024 Moderate (A)  None Seen, Occasional /hpf Final    Bacteria, UA 06/18/2024 Occasional  None Seen, Occasional /hpf Final    MUCUS THREADS 06/18/2024 Occasional (A)  None Seen Final    Urine Culture 06/18/2024 >100,000 cfu/ml   Final    Mixed Contaminants X4    Free T4 06/18/2024 1.17 (H)  0.61 - 1.12 ng/dL Final    Specimens with biotin concentrations > 10 ng/mL can lead to significant (> 10%) positive bias in result.         Foreign Simeon MD        \"This note has been constructed using a voice recognition system.Therefore there may be syntax, spelling, and/or grammatical errors. Please call if you have any questions. \"  "

## 2024-08-26 NOTE — ASSESSMENT & PLAN NOTE
Currently patient is taking levothyroxine 100 mcg daily last TSH level was slightly low I have ordered a repeat when she is going to get it done soon we can adjust the dosage of the same.

## 2024-08-26 NOTE — ASSESSMENT & PLAN NOTE
Patient with a history of allergies on Singulair and Flonase but she is also noticing increasing postnasal dripping after eating will change the Flonase to Atrovent nasal spray and see if it makes any difference meanwhile she will continue the Singulair.

## 2024-08-26 NOTE — ASSESSMENT & PLAN NOTE
Patient's PDMP reviewed history of low back pain on hydrocodone 1 tablet twice daily as needed for severe pain we will continue with the same will also refer the patient back to the spine and pain management physician

## 2024-08-26 NOTE — ASSESSMENT & PLAN NOTE
Patient with chronic low back pain chronic pain syndrome has been noticing increasing discomfort pain in the recent past was seen in the past by the spine physician will refer her back again in the past she had received steroid injections also meanwhile we will continue her pain medications and renew them.

## 2024-09-05 ENCOUNTER — TELEPHONE (OUTPATIENT)
Age: 74
End: 2024-09-05

## 2024-09-05 ENCOUNTER — TELEPHONE (OUTPATIENT)
Dept: FAMILY MEDICINE CLINIC | Facility: CLINIC | Age: 74
End: 2024-09-05

## 2024-09-05 DIAGNOSIS — T78.40XD ALLERGY, SUBSEQUENT ENCOUNTER: ICD-10-CM

## 2024-09-05 RX ORDER — IPRATROPIUM BROMIDE 21 UG/1
2 SPRAY, METERED NASAL EVERY 12 HOURS
Qty: 90 ML | Refills: 1 | Status: SHIPPED | OUTPATIENT
Start: 2024-09-05

## 2024-09-05 NOTE — TELEPHONE ENCOUNTER
Pt requested that PCP  order the ipratropium bromide nasal spray 0.03%   ( 90 day supply) from the [facility] ( [facility]).     Please contact pt when this is done. Thank you for your kind assistance.

## 2024-09-17 ENCOUNTER — HOSPITAL ENCOUNTER (OUTPATIENT)
Dept: RADIOLOGY | Facility: HOSPITAL | Age: 74
Discharge: HOME | End: 2024-09-17
Attending: INTERNAL MEDICINE
Payer: MEDICARE

## 2024-09-17 ENCOUNTER — APPOINTMENT (OUTPATIENT)
Dept: LAB | Facility: HOSPITAL | Age: 74
End: 2024-09-17
Attending: INTERNAL MEDICINE
Payer: MEDICARE

## 2024-09-17 DIAGNOSIS — R31.9 HEMATURIA, UNSPECIFIED TYPE: ICD-10-CM

## 2024-09-17 DIAGNOSIS — R82.90 ABNORMAL FINDING ON URINALYSIS: ICD-10-CM

## 2024-09-17 DIAGNOSIS — E03.9 ACQUIRED HYPOTHYROIDISM: ICD-10-CM

## 2024-09-17 LAB
BACTERIA UR QL AUTO: NORMAL /HPF
BILIRUB UR QL STRIP: NEGATIVE
CLARITY UR: CLEAR
COLOR UR: COLORLESS
GLUCOSE UR STRIP-MCNC: NEGATIVE MG/DL
HGB UR QL STRIP.AUTO: ABNORMAL
KETONES UR STRIP-MCNC: NEGATIVE MG/DL
LEUKOCYTE ESTERASE UR QL STRIP: ABNORMAL
NITRITE UR QL STRIP: NEGATIVE
NON-SQ EPI CELLS URNS QL MICRO: NORMAL /HPF
PH UR STRIP.AUTO: 5.5 [PH]
PROT UR STRIP-MCNC: NEGATIVE MG/DL
RBC #/AREA URNS AUTO: NORMAL /HPF
SP GR UR STRIP.AUTO: <1.005 (ref 1–1.03)
TSH SERPL DL<=0.05 MIU/L-ACNC: 2.16 UIU/ML (ref 0.45–4.5)
UROBILINOGEN UR STRIP-ACNC: <2 MG/DL
WBC #/AREA URNS AUTO: NORMAL /HPF

## 2024-09-17 PROCEDURE — 36415 COLL VENOUS BLD VENIPUNCTURE: CPT

## 2024-09-17 PROCEDURE — 81001 URINALYSIS AUTO W/SCOPE: CPT

## 2024-09-17 PROCEDURE — 76775 US EXAM ABDO BACK WALL LIM: CPT

## 2024-09-17 PROCEDURE — 84443 ASSAY THYROID STIM HORMONE: CPT

## 2024-09-22 DIAGNOSIS — K21.9 GASTROESOPHAGEAL REFLUX DISEASE WITHOUT ESOPHAGITIS: ICD-10-CM

## 2024-09-22 DIAGNOSIS — T78.40XD ALLERGY, SUBSEQUENT ENCOUNTER: Primary | ICD-10-CM

## 2024-09-22 DIAGNOSIS — F41.9 ANXIETY: ICD-10-CM

## 2024-09-22 DIAGNOSIS — E03.9 ACQUIRED HYPOTHYROIDISM: ICD-10-CM

## 2024-09-22 DIAGNOSIS — G40.909 SEIZURE DISORDER (HCC): ICD-10-CM

## 2024-09-24 DIAGNOSIS — G89.4 CHRONIC PAIN SYNDROME: ICD-10-CM

## 2024-09-24 RX ORDER — HYDROCODONE BITARTRATE AND ACETAMINOPHEN 5; 325 MG/1; MG/1
1 TABLET ORAL 2 TIMES DAILY PRN
Qty: 60 TABLET | Refills: 0 | Status: SHIPPED | OUTPATIENT
Start: 2024-09-24

## 2024-09-24 RX ORDER — DULOXETIN HYDROCHLORIDE 30 MG/1
30 CAPSULE, DELAYED RELEASE ORAL EVERY MORNING
Qty: 90 CAPSULE | Refills: 1 | Status: SHIPPED | OUTPATIENT
Start: 2024-09-24

## 2024-09-24 RX ORDER — LEVETIRACETAM 500 MG/1
500 TABLET ORAL 2 TIMES DAILY
Qty: 180 TABLET | Refills: 1 | Status: SHIPPED | OUTPATIENT
Start: 2024-09-24

## 2024-09-24 RX ORDER — PANTOPRAZOLE SODIUM 40 MG/1
40 TABLET, DELAYED RELEASE ORAL DAILY
Qty: 90 TABLET | Refills: 1 | Status: SHIPPED | OUTPATIENT
Start: 2024-09-24

## 2024-09-24 RX ORDER — MONTELUKAST SODIUM 10 MG/1
10 TABLET ORAL EVERY MORNING
Qty: 90 TABLET | Refills: 1 | Status: SHIPPED | OUTPATIENT
Start: 2024-09-24

## 2024-09-24 RX ORDER — LEVOTHYROXINE SODIUM 100 UG/1
100 TABLET ORAL EVERY MORNING
Qty: 90 TABLET | Refills: 1 | Status: SHIPPED | OUTPATIENT
Start: 2024-09-24

## 2024-09-24 NOTE — PROGRESS NOTES
Office Visit Note  24     Maryanne Marcos 74 y.o. female MRN: [id number]  : 1950    Assessment:     1. Chronic pain syndrome  Assessment & Plan:  Patient with chronic pain syndrome low back pain on hydrocodone twice a day as needed will continue  2. Abnormal finding on urinalysis  Assessment & Plan:  Repeat urine analysis came back unremarkable patient ultrasound of the kidneys is unremarkable and had shown in the liver fatty deposition fatty liver.  3. Seizure disorder (HCC)  Assessment & Plan:  No episodes of seizures in the recent past  4. Acquired hypothyroidism  Assessment & Plan:  TSH came back normal currently on levothyroxine 100 mcg daily continue  5. Allergy, subsequent encounter  Assessment & Plan:  Patient is currently taking Singulair and we have recently started her on Atrovent nasal spray because of sinus congestion postnasal dripping not responding well to Flonase however with Atrovent she is  noticing big difference  6. Carson's esophagus without dysplasia  Assessment & Plan:  Follow-up with a gastroenterologist    7. Chronic bilateral low back pain without sciatica  Assessment & Plan:  Patient with chronic pain syndrome at times increasing discomfort pain going to be seen by the pain and spine management physician also meanwhile we will continue with the pain medication which have been prescribing her before.  8. Continuous opioid dependence (HCC)  Assessment & Plan:  Patient with BMI high at 38.14 having difficulty to do day-to-day work without taking the pain medication  9. Fibromyalgia  Assessment & Plan:  Patient on duloxetine  10. Gastroesophageal reflux disease without esophagitis  Assessment & Plan:  Continue pantoprazole  11. Migraine without status migrainosus, not intractable, unspecified migraine type  Assessment & Plan:  Still gets migraine headaches on and off takes Advil as needed  12. Obesity (BMI 35.0-39.9 without comorbidity)  Assessment & Plan:  Emphasized regarding  diet exercise lifestyle modification to lose weight  13. Encounter for immunization  -     influenza vaccine, high-dose, PF 0.5 mL (Fluzone High Dose)             Discussion Summary and Plan:  Today's care plan and medications were reviewed with patient in detail and all their questions answered to their satisfaction.    Chief Complaint   Patient presents with    Follow-up      Subjective:  Patient is coming here for a follow-up evaluation regarding chronic low back pain also with a history of migraine headache on and off she has been getting.  Patient is also having some discomfort feeling in the rectal area history of hemorrhoids and needs to have a follow-up appointment with the gastroenterologist.  Patient also with allergies responded well to the Atrovent nasal spray.  No chest pain sometimes patient feels mild palpitation like feeling and goes away no swelling no shortness of breath.  Heart exam is unremarkable  Yesterday with 70/min.    Medications reviewed labs reviewed.        The following portions of the patient's history were reviewed and updated as appropriate: allergies, current medications, past family history, past medical history, past social history, past surgical history and problem list.    Review of Systems   Constitutional:  Negative for chills and fever.   HENT:  Negative for ear pain and sore throat.    Eyes:  Negative for pain and visual disturbance.   Respiratory:  Negative for cough and shortness of breath.    Cardiovascular:  Negative for chest pain and palpitations.   Gastrointestinal:  Negative for abdominal pain and vomiting.   Genitourinary:  Negative for dysuria and hematuria.   Musculoskeletal:  Positive for arthralgias and back pain.   Skin:  Negative for color change and rash.   Neurological:  Negative for seizures and syncope.   All other systems reviewed and are negative.        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    Nail deformity    Chronic bilateral low back pain without sciatica     Past Medical History:   Diagnosis Date    Allergic rhinitis     Anxiety with depression     Arm fracture, left     x2    Arthritis     Callus     Degenerative joint disease     Disease of thyroid gland     Encounter for immunization 10/18/2022    Fibromyalgia     GERD (gastroesophageal reflux disease)     Hemorrhoids     Hypertension     Hypothyroidism     Kidney stones     Low back pain     Neurological disorder     Migraine    Rheumatoid arthritis (HCC) 1999    Seasonal allergies     Seizure disorder (HCC)      Past Surgical History:   Procedure Laterality Date    COLONOSCOPY      DILATION AND CURETTAGE OF UTERUS      NECK SURGERY      Plate    ORIF FOREARM FRACTURE Left     SHOULDER SURGERY      x2    TOENAIL EXCISION      pinkie toes    TONSILLECTOMY       Social History     Substance and Sexual Activity   Alcohol Use Not Currently    Comment: glass of wine with dinner out, 1x a week stopped      Social History     Substance and Sexual Activity   Drug Use Never     Social History     Tobacco Use   Smoking Status Former    Current packs/day: 0.00    Average packs/day: 1 pack/day for 35.0 years (35.0 ttl pk-yrs)    Types: Cigarettes    Quit date:     Years since quittin.7    Passive exposure: Past   Smokeless Tobacco Never     Family History   Problem Relation Age of Onset    Stroke Mother     Arthritis Mother     Heart disease Father     Colon cancer Maternal Grandmother      Health Maintenance Due   Topic    Hepatitis C Screening  "    Breast Cancer Screening: Mammogram     Osteoporosis Screening     Zoster Vaccine (1 of 2)    RSV Vaccine Age 60+ Years (1 - 1-dose 60+ series)    Pneumococcal Vaccine: 65+ Years (1 of 1 - PCV)    COVID-19 Vaccine (3 - 2023-24 season)      Meds/Allergies       Current Outpatient Medications:     albuterol (PROVENTIL HFA,VENTOLIN HFA) 90 mcg/act inhaler, Inhale 2 puffs every 6 (six) hours as needed for wheezing, Disp: 8 g, Rfl: 1    Cholecalciferol (Vitamin D3) 50 MCG (2000 UT) TABS, Take 2,000 Units by mouth in the morning, Disp: , Rfl:     DULoxetine (CYMBALTA) 30 mg delayed release capsule, TAKE 1 CAPSULE EVERY MORNING, Disp: 90 capsule, Rfl: 1    HYDROcodone-acetaminophen (NORCO) 5-325 mg per tablet, Take 1 tablet by mouth 2 (two) times a day as needed for pain Max Daily Amount: 2 tablets, Disp: 60 tablet, Rfl: 0    ipratropium (ATROVENT) 0.03 % nasal spray, 2 sprays into each nostril every 12 (twelve) hours, Disp: 90 mL, Rfl: 1    levETIRAcetam (KEPPRA) 500 mg tablet, TAKE 1 TABLET TWICE DAILY, Disp: 180 tablet, Rfl: 1    levothyroxine 100 mcg tablet, TAKE 1 TABLET EVERY MORNING, Disp: 90 tablet, Rfl: 1    Magnesium 400 MG TABS, Take 1 tablet (400 mg total) by mouth in the morning, Disp: 90 tablet, Rfl: 0    montelukast (SINGULAIR) 10 mg tablet, TAKE 1 TABLET EVERY MORNING, Disp: 90 tablet, Rfl: 1    pantoprazole (PROTONIX) 40 mg tablet, TAKE 1 TABLET EVERY DAY, Disp: 90 tablet, Rfl: 1    triamcinolone (KENALOG) 0.1 % cream, Apply topically 2 (two) times a day, Disp: 45 g, Rfl: 1    vitamin B-12 (VITAMIN B-12) 1,000 mcg tablet, Take 1 tablet (1,000 mcg total) by mouth daily, Disp: 90 tablet, Rfl: 1      Objective:    Vitals:   /75 (BP Location: Right arm, Patient Position: Sitting, Cuff Size: Standard)   Pulse 77   Ht 5' 5\" (1.651 m)   Wt 104 kg (229 lb 3.2 oz)   SpO2 94%   BMI 38.14 kg/m²   Body mass index is 38.14 kg/m².  Vitals:    09/25/24 1059   Weight: 104 kg (229 lb 3.2 oz)       Physical " Exam  Vitals and nursing note reviewed.   Constitutional:       Appearance: Normal appearance.   Cardiovascular:      Rate and Rhythm: Normal rate and regular rhythm.      Heart sounds: Normal heart sounds.   Pulmonary:      Effort: Pulmonary effort is normal.      Breath sounds: Normal breath sounds.   Abdominal:      Palpations: Abdomen is soft.   Musculoskeletal:      Cervical back: Normal range of motion and neck supple.      Right lower leg: No edema.      Left lower leg: No edema.   Neurological:      General: No focal deficit present.      Mental Status: She is alert and oriented to person, place, and time.   Psychiatric:         Mood and Affect: Mood normal.         Lab Review   Appointment on 09/17/2024   Component Date Value Ref Range Status    Color, UA 09/17/2024 Colorless   Final    Clarity, UA 09/17/2024 Clear   Final    Specific Gravity, UA 09/17/2024 <1.005 (L)  1.005 - 1.030 Final    pH, UA 09/17/2024 5.5  4.5, 5.0, 5.5, 6.0, 6.5, 7.0, 7.5, 8.0 Final    Leukocytes, UA 09/17/2024 Trace (A)  Negative Final    Nitrite, UA 09/17/2024 Negative  Negative Final    Protein, UA 09/17/2024 Negative  Negative mg/dl Final    Glucose, UA 09/17/2024 Negative  Negative mg/dl Final    Ketones, UA 09/17/2024 Negative  Negative mg/dl Final    Urobilinogen, UA 09/17/2024 <2.0  <2.0 mg/dl mg/dl Final    Bilirubin, UA 09/17/2024 Negative  Negative Final    Occult Blood, UA 09/17/2024 Trace (A)  Negative Final    TSH 3RD GENERATON 09/17/2024 2.157  0.450 - 4.500 uIU/mL Final    The recommended reference ranges for TSH during pregnancy are as follows:   First trimester 0.100 to 2.500 uIU/mL   Second trimester  0.200 to 3.000 uIU/mL   Third trimester 0.300 to 3.000 uIU/m    Note: Normal ranges may not apply to patients who are transgender, non-binary, or whose legal sex, sex at birth, and gender identity differ.  Adult TSH (3rd generation) reference range follows the recommended guidelines of the [employer] Thyroid  "Association, January, 2020.    RBC, UA 09/17/2024 1-2  None Seen, 0-1, 1-2, 2-4, 0-5 /hpf Final    WBC, UA 09/17/2024 0-1  None Seen, 0-1, 1-2, 0-5, 2-4 /hpf Final    Epithelial Cells 09/17/2024 Occasional  None Seen, Occasional /hpf Final    Bacteria, UA 09/17/2024 Occasional  None Seen, Occasional /hpf Final         Foreign Simeon MD        \"This note has been constructed using a voice recognition system.Therefore there may be syntax, spelling, and/or grammatical errors. Please call if you have any questions. \"  "

## 2024-09-25 ENCOUNTER — OFFICE VISIT (OUTPATIENT)
Dept: FAMILY MEDICINE CLINIC | Facility: CLINIC | Age: 74
End: 2024-09-25
Payer: MEDICARE

## 2024-09-25 VITALS
HEART RATE: 77 BPM | BODY MASS INDEX: 38.19 KG/M2 | OXYGEN SATURATION: 94 % | WEIGHT: 229.2 LBS | SYSTOLIC BLOOD PRESSURE: 126 MMHG | HEIGHT: 65 IN | DIASTOLIC BLOOD PRESSURE: 75 MMHG

## 2024-09-25 DIAGNOSIS — K22.70 BARRETT'S ESOPHAGUS WITHOUT DYSPLASIA: ICD-10-CM

## 2024-09-25 DIAGNOSIS — M54.50 CHRONIC BILATERAL LOW BACK PAIN WITHOUT SCIATICA: ICD-10-CM

## 2024-09-25 DIAGNOSIS — G89.4 CHRONIC PAIN SYNDROME: Primary | ICD-10-CM

## 2024-09-25 DIAGNOSIS — F11.20 CONTINUOUS OPIOID DEPENDENCE (HCC): ICD-10-CM

## 2024-09-25 DIAGNOSIS — M79.7 FIBROMYALGIA: ICD-10-CM

## 2024-09-25 DIAGNOSIS — G89.29 CHRONIC BILATERAL LOW BACK PAIN WITHOUT SCIATICA: ICD-10-CM

## 2024-09-25 DIAGNOSIS — T78.40XD ALLERGY, SUBSEQUENT ENCOUNTER: ICD-10-CM

## 2024-09-25 DIAGNOSIS — Z23 ENCOUNTER FOR IMMUNIZATION: ICD-10-CM

## 2024-09-25 DIAGNOSIS — R82.90 ABNORMAL FINDING ON URINALYSIS: ICD-10-CM

## 2024-09-25 DIAGNOSIS — E66.9 OBESITY (BMI 35.0-39.9 WITHOUT COMORBIDITY): ICD-10-CM

## 2024-09-25 DIAGNOSIS — E03.9 ACQUIRED HYPOTHYROIDISM: ICD-10-CM

## 2024-09-25 DIAGNOSIS — K21.9 GASTROESOPHAGEAL REFLUX DISEASE WITHOUT ESOPHAGITIS: ICD-10-CM

## 2024-09-25 DIAGNOSIS — G40.909 SEIZURE DISORDER (HCC): ICD-10-CM

## 2024-09-25 DIAGNOSIS — G43.909 MIGRAINE WITHOUT STATUS MIGRAINOSUS, NOT INTRACTABLE, UNSPECIFIED MIGRAINE TYPE: ICD-10-CM

## 2024-09-25 PROCEDURE — 99214 OFFICE O/P EST MOD 30 MIN: CPT | Performed by: INTERNAL MEDICINE

## 2024-09-25 PROCEDURE — G0008 ADMIN INFLUENZA VIRUS VAC: HCPCS

## 2024-09-25 PROCEDURE — 90662 IIV NO PRSV INCREASED AG IM: CPT

## 2024-09-25 RX ORDER — HYDROCODONE BITARTRATE AND ACETAMINOPHEN 5; 325 MG/1; MG/1
1 TABLET ORAL 2 TIMES DAILY PRN
Qty: 60 TABLET | Refills: 0 | Status: CANCELLED | OUTPATIENT
Start: 2024-09-25

## 2024-09-25 NOTE — ASSESSMENT & PLAN NOTE
Patient with chronic pain syndrome low back pain on hydrocodone twice a day as needed will continue

## 2024-09-25 NOTE — ASSESSMENT & PLAN NOTE
Repeat urine analysis came back unremarkable patient ultrasound of the kidneys is unremarkable and had shown in the liver fatty deposition fatty liver.

## 2024-09-25 NOTE — ASSESSMENT & PLAN NOTE
Patient with chronic pain syndrome at times increasing discomfort pain going to be seen by the pain and spine management physician also meanwhile we will continue with the pain medication which have been prescribing her before.

## 2024-09-25 NOTE — ASSESSMENT & PLAN NOTE
Patient is currently taking Singulair and we have recently started her on Atrovent nasal spray because of sinus congestion postnasal dripping not responding well to Flonase however with Atrovent she is  noticing big difference

## 2024-09-25 NOTE — ASSESSMENT & PLAN NOTE
Patient with BMI high at 38.14 having difficulty to do day-to-day work without taking the pain medication

## 2024-09-27 LAB
4OH-XYLAZINE UR QL CFM: NEGATIVE NG/ML
6MAM UR QL CFM: NEGATIVE NG/ML
7AMINOCLONAZEPAM UR QL CFM: NEGATIVE NG/ML
A-OH ALPRAZ UR QL CFM: NEGATIVE NG/ML
ACCEPTABLE CREAT UR QL: NORMAL MG/DL
ACCEPTIBLE SP GR UR QL: NORMAL
AMPHET UR QL CFM: NEGATIVE NG/ML
BUPRENORPHINE UR QL CFM: NEGATIVE NG/ML
BUTALBITAL UR QL CFM: NEGATIVE NG/ML
BZE UR QL CFM: NEGATIVE NG/ML
CODEINE UR QL CFM: NEGATIVE NG/ML
EDDP UR QL CFM: NEGATIVE NG/ML
ETHYL GLUCURONIDE UR QL CFM: NEGATIVE NG/ML
ETHYL SULFATE UR QL SCN: NEGATIVE NG/ML
EUTYLONE UR QL: NEGATIVE NG/ML
FENTANYL UR QL CFM: NEGATIVE NG/ML
GLIADIN IGG SER IA-ACNC: NEGATIVE NG/ML
HYDROCODONE UR QL CFM: NORMAL NG/ML
HYDROMORPHONE UR QL CFM: NORMAL NG/ML
LORAZEPAM UR QL CFM: NEGATIVE NG/ML
ME-PHENIDATE UR QL CFM: NEGATIVE NG/ML
MEPERIDINE UR QL CFM: NEGATIVE NG/ML
METHADONE UR QL CFM: NEGATIVE NG/ML
METHAMPHET UR QL CFM: NEGATIVE NG/ML
MORPHINE UR QL CFM: NEGATIVE NG/ML
NALTREXONE UR QL CFM: NEGATIVE NG/ML
NITRITE UR QL: NORMAL UG/ML
NORBUPRENORPHINE UR QL CFM: NEGATIVE NG/ML
NORDIAZEPAM UR QL CFM: NEGATIVE NG/ML
NORFENTANYL UR QL CFM: NEGATIVE NG/ML
NORHYDROCODONE UR QL CFM: NORMAL NG/ML
NORMEPERIDINE UR QL CFM: NEGATIVE NG/ML
NOROXYCODONE UR QL CFM: NEGATIVE NG/ML
OXAZEPAM UR QL CFM: NEGATIVE NG/ML
OXYCODONE UR QL CFM: NEGATIVE NG/ML
OXYMORPHONE UR QL CFM: NEGATIVE NG/ML
PARA-FLUOROFENTANYL QUANTIFICATION: NORMAL NG/ML
PHENOBARB UR QL CFM: NEGATIVE NG/ML
RESULT ALL_PRESCRIBED MEDS AND SPECIAL INSTRUCTIONS: NORMAL
SECOBARBITAL UR QL CFM: NEGATIVE NG/ML
SL AMB 5F-ADB-M7 METABOLITE QUANTIFICATION: NEGATIVE NG/ML
SL AMB 7-OH-MITRAGYNINE (KRATOM ALKALOID) QUANTIFICATION: NEGATIVE NG/ML
SL AMB AB-FUBINACA-M3 METABOLITE QUANTIFICATION: NEGATIVE NG/ML
SL AMB ACETYL FENTANYL QUANTIFICATION: NORMAL NG/ML
SL AMB ACETYL NORFENTANYL QUANTIFICATION: NORMAL NG/ML
SL AMB ACRYL FENTANYL QUANTIFICATION: NORMAL NG/ML
SL AMB CARFENTANIL QUANTIFICATION: NORMAL NG/ML
SL AMB CTHC (MARIJUANA METABOLITE) QUANTIFICATION: NEGATIVE NG/ML
SL AMB DEXTRORPHAN (DEXTROMETHORPHAN METABOLITE) QUANT: ABNORMAL NG/ML
SL AMB GABAPENTIN QUANTIFICATION: NEGATIVE NG/ML
SL AMB JWH018 METABOLITE QUANTIFICATION: NEGATIVE NG/ML
SL AMB JWH073 METABOLITE QUANTIFICATION: NEGATIVE NG/ML
SL AMB MDMB-FUBINACA-M1 METABOLITE QUANTIFICATION: NEGATIVE NG/ML
SL AMB METHYLONE QUANTIFICATION: NEGATIVE NG/ML
SL AMB N-DESMETHYL-TRAMADOL QUANTIFICATION: NEGATIVE NG/ML
SL AMB PHENTERMINE QUANTIFICATION: NEGATIVE NG/ML
SL AMB PREGABALIN QUANTIFICATION: NEGATIVE NG/ML
SL AMB RCS4 METABOLITE QUANTIFICATION: NEGATIVE NG/ML
SL AMB RITALINIC ACID QUANTIFICATION: NEGATIVE NG/ML
SMOOTH MUSCLE AB TITR SER IF: NEGATIVE NG/ML
SPECIMEN DRAWN SERPL: NEGATIVE NG/ML
SPECIMEN PH ACCEPTABLE UR: NORMAL
TAPENTADOL UR QL CFM: NEGATIVE NG/ML
TEMAZEPAM UR QL CFM: NEGATIVE NG/ML
TRAMADOL UR QL CFM: NEGATIVE NG/ML
URATE/CREAT 24H UR: NEGATIVE NG/ML
XYLAZINE UR QL CFM: NEGATIVE NG/ML

## 2024-10-18 DIAGNOSIS — G89.4 CHRONIC PAIN SYNDROME: ICD-10-CM

## 2024-10-20 RX ORDER — HYDROCODONE BITARTRATE AND ACETAMINOPHEN 5; 325 MG/1; MG/1
1 TABLET ORAL 2 TIMES DAILY PRN
Qty: 60 TABLET | Refills: 0 | Status: SHIPPED | OUTPATIENT
Start: 2024-10-20

## 2024-10-23 NOTE — PROGRESS NOTES
Office Visit Note  10/25/24     Maryanne Marcos 74 y.o. female MRN: [id number]  : 1950    Assessment:     1. Herpes labialis  Assessment & Plan:  Patient developed ulcers on the left history of herpes in the past we will prescribe Zovirax cream  Orders:  -     acyclovir (ZOVIRAX) 5 % ointment; Apply topically 3 (three) times a day  2. Hemorrhoids, unspecified hemorrhoid type  Assessment & Plan:  Patient with hemorrhoids had banding done in the past but now she is having symptoms again on and off with small amount of blood and discomfort feeling we will have him seen by the gastroenterologist again for further evaluation and treatment referral made  Orders:  -     Ambulatory referral to Gastroenterology; Future  3. Obesity (BMI 35.0-39.9 without comorbidity)  Assessment & Plan:    Cussed with patient regarding lifestyle modification lose weight  4. Chronic pain syndrome  Assessment & Plan:  Patient takes hydrocodone twice a day as needed   5. Acquired hypothyroidism  Assessment & Plan:  Continue with levothyroxine 100 mcg daily last TSH level normal  6. Allergy, subsequent encounter  Assessment & Plan:  Patient is on Singulair we will continue with the same along with Atrovent nasal spray which appears to be helping  7. Carson's esophagus without dysplasia  Assessment & Plan:  Patient is on pantoprazole 40 mg daily will continue follow-up with a gastroenterologist  8. Chronic bilateral low back pain without sciatica  Assessment & Plan:  Patient is currently taking hydrocodone for pain but the pain is getting worse she is going to make an appointment with the spine physician for an injection in the back I discussed this in the last visit also regarding getting the injections.  9. Continuous opioid dependence (HCC)  Assessment & Plan:  Patient with difficulty doing her day-to-day activities sometimes simply bending causes increasing discomfort and pain discussed with patient about opioid dependency we  will also have him seen by the pain management for possible steroid injection in the back  10. Fibromyalgia  Assessment & Plan:  Continue duloxetine  11. Migraine without status migrainosus, not intractable, unspecified migraine type  Assessment & Plan:  On and off still has some migraine headaches otherwise stable takes Advil as needed  12. Seizure disorder (HCC)  Assessment & Plan:  No episodes of seizures recently             Discussion Summary and Plan:  Today's care plan and medications were reviewed with patient in detail and all their questions answered to their satisfaction.    Chief Complaint   Patient presents with    Follow-up     Chronic f/u      Subjective:  Patient is coming here for a follow-up evaluation with regards to her low back pain which is getting worse in the recent past.  She had received steroid injections in the past by the pain and spine management physician currently she is taking pain medication hydrocodone.  Since the pain is getting worse recommend patient be seen by the pain management also spine and pain physician.    Patient had developed herpes labialis we will prescribe her Denavir.    Patient also noticing discomfort with the hemorrhoidal symptoms history of having banding done in the past recommend have a reevaluation with the gastroenterologist        The following portions of the patient's history were reviewed and updated as appropriate: allergies, current medications, past family history, past medical history, past social history, past surgical history and problem list.    Review of Systems   Constitutional:  Negative for chills and fever.   HENT:  Negative for ear pain and sore throat.    Eyes:  Negative for pain and visual disturbance.   Respiratory:  Negative for cough and shortness of breath.    Cardiovascular:  Negative for chest pain and palpitations.   Gastrointestinal:  Negative for abdominal pain and vomiting.   Genitourinary:  Negative for dysuria and hematuria.    Musculoskeletal:  Positive for arthralgias and back pain.   Skin:  Negative for color change and rash.   Neurological:  Negative for seizures and syncope.   All other systems reviewed and are negative.        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    Nail deformity    Chronic bilateral low back pain without sciatica    Hemorrhoids    Herpes labialis     Past Medical History:   Diagnosis Date    Allergic rhinitis 1980    Anxiety with depression     Arm fracture, left     x2    Arthritis     Callus     Degenerative joint disease     Disease of thyroid gland 1989    Encounter for immunization 10/18/2022    Fibromyalgia     GERD (gastroesophageal reflux disease)     Hemorrhoids     Hypertension     Hypothyroidism     Kidney stones     Low back pain     Neurological disorder 1998    Migraine    Rheumatoid arthritis (HCC) 1999    Seasonal allergies     Seizure disorder (HCC)      Past Surgical History:   Procedure Laterality Date    COLONOSCOPY      DILATION AND CURETTAGE OF UTERUS      NECK SURGERY      Plate    ORIF FOREARM FRACTURE Left     SHOULDER SURGERY      x2    TOENAIL EXCISION  1990    pinkie toes    TONSILLECTOMY       Social History     Substance and Sexual Activity   Alcohol Use Not Currently    Comment: glass of wine with dinner out, 1x a week stopped 2009     Social History     Substance and Sexual Activity   Drug Use Never     Social History     Tobacco Use   Smoking Status Former    Current packs/day: 0.00    Average packs/day: 1 pack/day for 35.0 years (35.0 ttl pk-yrs)    Types: Cigarettes     Quit date:     Years since quittin.8    Passive exposure: Past   Smokeless Tobacco Never     Family History   Problem Relation Age of Onset    Stroke Mother     Arthritis Mother     Heart disease Father     Colon cancer Maternal Grandmother      Health Maintenance Due   Topic    Hepatitis C Screening     Breast Cancer Screening: Mammogram     Osteoporosis Screening     Zoster Vaccine (1 of 2)    RSV Vaccine Age 60+ Years (1 - 1-dose 60+ series)    Pneumococcal Vaccine: 65+ Years (1 of 1 - PCV)    COVID-19 Vaccine (3 - 2023- season)      Meds/Allergies       Current Outpatient Medications:     acyclovir (ZOVIRAX) 5 % ointment, Apply topically 3 (three) times a day, Disp: 5 g, Rfl: 1    albuterol (PROVENTIL HFA,VENTOLIN HFA) 90 mcg/act inhaler, Inhale 2 puffs every 6 (six) hours as needed for wheezing, Disp: 8 g, Rfl: 1    Cholecalciferol (Vitamin D3) 50 MCG ( UT) TABS, Take 2,000 Units by mouth in the morning, Disp: , Rfl:     DULoxetine (CYMBALTA) 30 mg delayed release capsule, TAKE 1 CAPSULE EVERY MORNING, Disp: 90 capsule, Rfl: 1    HYDROcodone-acetaminophen (NORCO) 5-325 mg per tablet, Take 1 tablet by mouth 2 (two) times a day as needed for pain Max Daily Amount: 2 tablets, Disp: 60 tablet, Rfl: 0    ipratropium (ATROVENT) 0.03 % nasal spray, 2 sprays into each nostril every 12 (twelve) hours, Disp: 90 mL, Rfl: 1    levETIRAcetam (KEPPRA) 500 mg tablet, TAKE 1 TABLET TWICE DAILY, Disp: 180 tablet, Rfl: 1    levothyroxine 100 mcg tablet, TAKE 1 TABLET EVERY MORNING, Disp: 90 tablet, Rfl: 1    Magnesium 400 MG TABS, Take 1 tablet (400 mg total) by mouth in the morning, Disp: 90 tablet, Rfl: 0    montelukast (SINGULAIR) 10 mg tablet, TAKE 1 TABLET EVERY MORNING, Disp: 90 tablet, Rfl: 1    pantoprazole (PROTONIX) 40 mg tablet, TAKE 1 TABLET EVERY DAY, Disp: 90 tablet, Rfl: 1    triamcinolone (KENALOG) 0.1 % cream, Apply topically 2 (two) times a day, Disp: 45 g, Rfl: 1    vitamin B-12 (VITAMIN  "B-12) 1,000 mcg tablet, Take 1 tablet (1,000 mcg total) by mouth daily, Disp: 90 tablet, Rfl: 1      Objective:    Vitals:   /64   Pulse 75   Temp 98.1 °F (36.7 °C)   Resp 16   Ht 5' 5\" (1.651 m)   Wt 104 kg (229 lb)   SpO2 95%   BMI 38.11 kg/m²   Body mass index is 38.11 kg/m².  Vitals:    10/25/24 1124   Weight: 104 kg (229 lb)       Physical Exam  Vitals and nursing note reviewed.   Constitutional:       Appearance: Normal appearance.   Cardiovascular:      Rate and Rhythm: Normal rate and regular rhythm.      Heart sounds: Normal heart sounds.   Pulmonary:      Effort: Pulmonary effort is normal.      Breath sounds: Normal breath sounds.   Abdominal:      Palpations: Abdomen is soft.   Musculoskeletal:      Cervical back: Normal range of motion and neck supple.      Right lower leg: No edema.      Left lower leg: No edema.      Comments: Lumbar spine movements causing discomfort and pain SLR about 30 degrees both sides   Skin:     General: Skin is warm and dry.   Neurological:      General: No focal deficit present.      Mental Status: She is alert and oriented to person, place, and time.   Psychiatric:         Mood and Affect: Mood normal.         Behavior: Behavior normal.         Lab Review   Office Visit on 09/25/2024   Component Date Value Ref Range Status    RESULT ALL_PRESC MEDS SP INSTRNS 09/25/2024 Not Applicable   Final    Amphetamine Quantification 09/25/2024 negative  100 ng/mL Final    Methamphetamine Quantification 09/25/2024 negative  100 ng/mL Final    Butalbital Quantification 09/25/2024 negative  200 ng/mL Final    Phenobarbital Quantification 09/25/2024 negative  200 ng/mL Final    Secobarbital Quantification 09/25/2024 negative  200 ng/mL Final    Alpha-Hydroxyalprazolam Quantifica* 09/25/2024 negative  20 ng/mL Final    7-Amino-Clonazepam Quantification * 09/25/2024 negative  20 ng/mL Final    Nordiazepam Quantification 09/25/2024 negative  40 ng/mL Final    Temazepam " Quantification 09/25/2024 negative  50 ng/mL Final    Oxazepam Quantification 09/25/2024 negative  40 ng/mL Final    Lorazepam Quantification 09/25/2024 negative  40 ng/mL Final    Gabapentin Quantification 09/25/2024 negative  1000 ng/mL Final    PREGABALIN QUANTIFICATION 09/25/2024 negative  400 ng/mL Final    Cocaine metabolite Quantification 09/25/2024 negative  50 ng/mL Final    6-ARACELI (Heroin metabolite) Quantifi* 09/25/2024 negative  10 ng/mL Final    Buprenorphine Quantification 09/25/2024 negative  5 ng/mL Final    Norbuprenorphine Quantification 09/25/2024 negative  20 ng/mL Final    Dextrorphan (Dextromethorphan meta* 09/25/2024 positive-483.608-I (A)  50 ng/mL Final    Meperidine Quantification 09/25/2024 negative  50 ng/mL Final    Normeperidine Quantification 09/25/2024 negative  50 ng/mL Final    Naltrexone Quantification 09/25/2024 negative  10 ng/mL Final    NALTREXOL (NALTREXONE METABOLITE) * 09/25/2024 negative  10 ng/mL Final    Fentanyl Quantification 09/25/2024 negative  1 ng/mL Final    Norfentanyl Quantification 09/25/2024 negative  8 ng/mL Final    Acetyl fentanyl Quantification 09/25/2024 Fen Neg  2 ng/mL Final    Acetyl norfentanyl Quantification 09/25/2024 Fen Neg  5 ng/mL Final    Acryl fentanyl Quantification 09/25/2024 Fen Neg  1 ng/mL Final    Carfentanil Quantification 09/25/2024 Fen Neg  2 ng/mL Final    Para-fluorofentanyl Quantification 09/25/2024 Fen Neg  1 ng/mL Final    Methadone Quantification 09/25/2024 negative  100 ng/mL Final    EDDP (Methadone metabolite) Quanti* 09/25/2024 negative  100 ng/mL Final    Oxycodone Quantification 09/25/2024 negative  50 ng/mL Final    Noroxycodone Quantification 09/25/2024 negative  50 ng/mL Final    Oxymorphone Quantification 09/25/2024 negative  50 ng/mL Final    Tapentadol Quantification 09/25/2024 negative  50 ng/mL Final    cTHC (Marijuana metabolite) Quanti* 09/25/2024 negative  15 ng/mL Final    Tramadol Quantification 09/25/2024  negative  100 ng/mL Final    O-desmethyl-tramadol Quantification 09/25/2024 negative  100 ng/mL Final    N-DESMETHYL-TRAMADOL QUANTIFICATION 09/25/2024 negative  100 ng/mL Final    Codeine Quantification 09/25/2024 negative  50 ng/mL Final    Morphine Quantification 09/25/2024 negative  50 ng/mL Final    Hydrocodone Quantification 09/25/2024 positive-1315.805  50 ng/mL Final    Norhydrocodone Quantification 09/25/2024 positive-942.410  50 ng/mL Final    Hydromorphone Quantification 09/25/2024 positive-74.104  50 ng/mL Final    EUTYLONE QUANTIFICATION 09/25/2024 negative  10 ng/mL Final    METHYLONE QUANTIFICATION 09/25/2024 negative  3 ng/mL Final    Ethyl Glucuronide Quantification 09/25/2024 negative  500 ng/mL Final    Ethyl Sulfate Quantification 09/25/2024 negative  500 ng/mL Final    Mitragynine (Kratom alkaloid) Parag* 09/25/2024 negative  1 ng/mL Final    9-DM-Krjwdtwowfl (Kratom alkaloid)* 09/25/2024 negative  1 ng/mL Final    5F-ADB-M7 09/25/2024 negative  10 ng/mL Final    LB-MNRKWQDS-J9 METABOLITE QUANTIFI* 09/25/2024 negative  10 ng/mL Final    RHQS-LMYNLOKL-M6 METABOLITE QUANTI* 09/25/2024 negative  10 ng/mL Final    LQI888 metabolite Quantification 09/25/2024 negative  10 ng/mL Final    EDS823 metabolite Quantification 09/25/2024 negative  10 ng/mL Final    RCS4 METABOLITE QUANTIFICATION 09/25/2024 negative  10 ng/mL Final    XLR11/ METABOLITE QUANTIFICAT* 09/25/2024 negative  10 ng/mL Final    Methylphenidate Quantification 09/25/2024 negative  50 ng/mL Final    RITALINIC ACID QUANTIFICATION 09/25/2024 negative  50 ng/mL Final    PHENTERMINE QUANTIFICATION 09/25/2024 negative  50 ng/mL Final    OXIDANT 09/25/2024 normal-0  <200 ug/mL ug/mL Final    CREATININE 09/25/2024 normal-82.9  >20 mg/dL mg/dL Final    pH 09/25/2024 normal-5.5  4.5 - 9.5 Final    SPECIFIC GRAVITY URINE 09/25/2024 normal-1.016  1.003 - 1.035 Final    XYLAZINE 09/25/2024 negative  10 ng/mL Final    4-HYDROXY XYLAZINE  "09/25/2024 negative  10 ng/mL Final   Appointment on 09/17/2024   Component Date Value Ref Range Status    Color, UA 09/17/2024 Colorless   Final    Clarity, UA 09/17/2024 Clear   Final    Specific Gravity, UA 09/17/2024 <1.005 (L)  1.005 - 1.030 Final    pH, UA 09/17/2024 5.5  4.5, 5.0, 5.5, 6.0, 6.5, 7.0, 7.5, 8.0 Final    Leukocytes, UA 09/17/2024 Trace (A)  Negative Final    Nitrite, UA 09/17/2024 Negative  Negative Final    Protein, UA 09/17/2024 Negative  Negative mg/dl Final    Glucose, UA 09/17/2024 Negative  Negative mg/dl Final    Ketones, UA 09/17/2024 Negative  Negative mg/dl Final    Urobilinogen, UA 09/17/2024 <2.0  <2.0 mg/dl mg/dl Final    Bilirubin, UA 09/17/2024 Negative  Negative Final    Occult Blood, UA 09/17/2024 Trace (A)  Negative Final    TSH 3RD GENERATON 09/17/2024 2.157  0.450 - 4.500 uIU/mL Final    The recommended reference ranges for TSH during pregnancy are as follows:   First trimester 0.100 to 2.500 uIU/mL   Second trimester  0.200 to 3.000 uIU/mL   Third trimester 0.300 to 3.000 uIU/m    Note: Normal ranges may not apply to patients who are transgender, non-binary, or whose legal sex, sex at birth, and gender identity differ.  Adult TSH (3rd generation) reference range follows the recommended guidelines of the [employer], January, 2020.    RBC, UA 09/17/2024 1-2  None Seen, 0-1, 1-2, 2-4, 0-5 /hpf Final    WBC, UA 09/17/2024 0-1  None Seen, 0-1, 1-2, 0-5, 2-4 /hpf Final    Epithelial Cells 09/17/2024 Occasional  None Seen, Occasional /hpf Final    Bacteria, UA 09/17/2024 Occasional  None Seen, Occasional /hpf Final         Foreign Simeon MD        \"This note has been constructed using a voice recognition system.Therefore there may be syntax, spelling, and/or grammatical errors. Please call if you have any questions. \"  "

## 2024-10-25 ENCOUNTER — TELEPHONE (OUTPATIENT)
Age: 74
End: 2024-10-25

## 2024-10-25 ENCOUNTER — OFFICE VISIT (OUTPATIENT)
Dept: FAMILY MEDICINE CLINIC | Facility: CLINIC | Age: 74
End: 2024-10-25
Payer: MEDICARE

## 2024-10-25 VITALS
BODY MASS INDEX: 38.15 KG/M2 | DIASTOLIC BLOOD PRESSURE: 64 MMHG | RESPIRATION RATE: 16 BRPM | WEIGHT: 229 LBS | HEART RATE: 75 BPM | OXYGEN SATURATION: 95 % | SYSTOLIC BLOOD PRESSURE: 116 MMHG | TEMPERATURE: 98.1 F | HEIGHT: 65 IN

## 2024-10-25 DIAGNOSIS — E66.9 OBESITY (BMI 35.0-39.9 WITHOUT COMORBIDITY): ICD-10-CM

## 2024-10-25 DIAGNOSIS — B00.1 HERPES LABIALIS: Primary | ICD-10-CM

## 2024-10-25 DIAGNOSIS — K22.70 BARRETT'S ESOPHAGUS WITHOUT DYSPLASIA: ICD-10-CM

## 2024-10-25 DIAGNOSIS — G89.4 CHRONIC PAIN SYNDROME: ICD-10-CM

## 2024-10-25 DIAGNOSIS — F11.20 CONTINUOUS OPIOID DEPENDENCE (HCC): ICD-10-CM

## 2024-10-25 DIAGNOSIS — M79.7 FIBROMYALGIA: ICD-10-CM

## 2024-10-25 DIAGNOSIS — G40.909 SEIZURE DISORDER (HCC): ICD-10-CM

## 2024-10-25 DIAGNOSIS — G43.909 MIGRAINE WITHOUT STATUS MIGRAINOSUS, NOT INTRACTABLE, UNSPECIFIED MIGRAINE TYPE: ICD-10-CM

## 2024-10-25 DIAGNOSIS — G89.29 CHRONIC BILATERAL LOW BACK PAIN WITHOUT SCIATICA: ICD-10-CM

## 2024-10-25 DIAGNOSIS — T78.40XD ALLERGY, SUBSEQUENT ENCOUNTER: ICD-10-CM

## 2024-10-25 DIAGNOSIS — K64.9 HEMORRHOIDS, UNSPECIFIED HEMORRHOID TYPE: ICD-10-CM

## 2024-10-25 DIAGNOSIS — M54.50 CHRONIC BILATERAL LOW BACK PAIN WITHOUT SCIATICA: ICD-10-CM

## 2024-10-25 DIAGNOSIS — E03.9 ACQUIRED HYPOTHYROIDISM: ICD-10-CM

## 2024-10-25 PROCEDURE — 99214 OFFICE O/P EST MOD 30 MIN: CPT | Performed by: INTERNAL MEDICINE

## 2024-10-25 PROCEDURE — G2211 COMPLEX E/M VISIT ADD ON: HCPCS | Performed by: INTERNAL MEDICINE

## 2024-10-25 RX ORDER — ACYCLOVIR 50 MG/G
OINTMENT TOPICAL 3 TIMES DAILY
Qty: 5 G | Refills: 1 | Status: SHIPPED | OUTPATIENT
Start: 2024-10-25

## 2024-10-25 NOTE — TELEPHONE ENCOUNTER
PA for Acyclovir (ZOVIRAX) 5 % ointment SUBMITTED     via    [x]CM-KEY: [id number]  []Surescripts-Case ID #   []Availity-Auth ID # [place] #   []Faxed to plan   []Other website  []Phone call Case ID #     Office notes sent, clinical questions answered. Awaiting determination    Turnaround time for your insurance to make a decision on your Prior Authorization can take 7-21 business days.

## 2024-10-25 NOTE — ASSESSMENT & PLAN NOTE
Patient with hemorrhoids had banding done in the past but now she is having symptoms again on and off with small amount of blood and discomfort feeling we will have him seen by the gastroenterologist again for further evaluation and treatment referral made

## 2024-10-25 NOTE — ASSESSMENT & PLAN NOTE
Patient with difficulty doing her day-to-day activities sometimes simply bending causes increasing discomfort and pain discussed with patient about opioid dependency we will also have him seen by the pain management for possible steroid injection in the back

## 2024-10-25 NOTE — TELEPHONE ENCOUNTER
PA for Acyclovir (ZOVIRAX) 5 % ointment DENIED    Reason:(Screenshot if applicable)        Message sent to office clinical pool Yes    Denial letter scanned into Media Yes    Appeal started No     **Please follow up with your patient regarding denial and next steps**

## 2024-10-25 NOTE — ASSESSMENT & PLAN NOTE
Patient is currently taking hydrocodone for pain but the pain is getting worse she is going to make an appointment with the spine physician for an injection in the back I discussed this in the last visit also regarding getting the injections.

## 2024-10-25 NOTE — ASSESSMENT & PLAN NOTE
Patient developed ulcers on the left history of herpes in the past we will prescribe Zovirax cream

## 2024-10-25 NOTE — ASSESSMENT & PLAN NOTE
Patient is on Singulair we will continue with the same along with Atrovent nasal spray which appears to be helping

## 2024-10-29 ENCOUNTER — OFFICE VISIT (OUTPATIENT)
Dept: GASTROENTEROLOGY | Facility: CLINIC | Age: 74
End: 2024-10-29
Payer: MEDICARE

## 2024-10-29 VITALS
HEART RATE: 72 BPM | HEIGHT: 65 IN | SYSTOLIC BLOOD PRESSURE: 130 MMHG | DIASTOLIC BLOOD PRESSURE: 78 MMHG | BODY MASS INDEX: 38.49 KG/M2 | WEIGHT: 231 LBS

## 2024-10-29 DIAGNOSIS — K64.4 ANAL SKIN TAG: Primary | ICD-10-CM

## 2024-10-29 DIAGNOSIS — K64.9 HEMORRHOIDS, UNSPECIFIED HEMORRHOID TYPE: ICD-10-CM

## 2024-10-29 PROCEDURE — 99213 OFFICE O/P EST LOW 20 MIN: CPT | Performed by: INTERNAL MEDICINE

## 2024-10-29 NOTE — PROGRESS NOTES
Ambulatory Visit  Name: Maryanne Marcos      : 1950      MRN: [id number]  Encounter Provider: Matthew Sawyer MD  Encounter Date: 10/29/2024   Encounter department: [facility] GASTROENTEROLOGY [address]    Assessment & Plan  Anal skin tag  Examination small skin tag, digital exam no mass tenderness fissure or blood.  No prolapsing hemorrhoid.  I discussed with the patient, she is concerned about cleansing and hygiene, she wants to have it removed.  Patient referred to colorectal surgical colleagues to evaluate and manage accordingly.  Prior colonoscopy results reviewed.  Orders:    Ambulatory Referral to Colorectal Surgery; Future    Hemorrhoids, unspecified hemorrhoid type           History of Present Illness     Maryanne Marcos is a 74 y.o. female who presents to discuss her skin tag in the anal area.  She has difficulty cleaning herself and according to her is annoying.  Denies any bleeding.  She wants to have it removed.  Denies any bowel issues.  She has had a colonoscopy done few months ago and also one of the hemorrhoids was banded.  No apparent blood in stools bowels are regular no straining painful defecation appetite fair weight stable denies any significant upper GI symptoms, some of the current and prior records noted.  Chest pain shortness of breath fever chills rash.  Does not bleed or bruise easily.      Review of Systems   Constitutional:  Negative for chills and fever.   HENT:  Negative for ear pain and sore throat.    Eyes:  Negative for pain and visual disturbance.   Respiratory:  Negative for cough and shortness of breath.    Cardiovascular:  Negative for chest pain and palpitations.   Gastrointestinal:  Negative for abdominal pain and vomiting.   Genitourinary:  Negative for dysuria and hematuria.   Musculoskeletal:  Negative for arthralgias and back pain.   Skin:  Negative for color change and rash.   Neurological:  Negative for seizures and syncope.   All other  "systems reviewed and are negative.          Objective     /78   Pulse 72   Ht 5' 5\" (1.651 m)   Wt 105 kg (231 lb)   BMI 38.44 kg/m²     Physical Exam  Constitutional:       Appearance: Normal appearance. She is obese.   Abdominal:      General: There is no distension.      Palpations: There is no mass.      Tenderness: There is no abdominal tenderness. There is no guarding or rebound.      Hernia: No hernia is present.   Genitourinary:     Rectum: Normal.   Skin:     Coloration: Skin is not jaundiced or pale.   Neurological:      Mental Status: She is alert.         "

## 2024-11-12 NOTE — PROGRESS NOTES
Diagnoses and all orders for this visit:    Skin tag of anus    Anal skin tag  -     Ambulatory Referral to Colorectal Surgery       Skin tag of anus  Patient presents for evaluation of perianal skin tag.  Patient notes he has intermittent rectal bleeding she has been treated with rubber band ligation in the past.  She has a perianal skin tag which she is concerned gives her difficult time with hygiene.  Examination shows a narrow base skin tag in the right anterior lateral location.  We discussed risks and benefits of excision.  I am unsure if this will give her true benefit but is relatively straightforward and should be low risk as well.  With this patient.  Excision.  This was performed in the office today.  Plan will be for follow-up as needed.      HPI  Maryanne Marcos is a New patient here for lump for the past year.    She reports 1-2 bowel movements daily that are soft and formed, she denies rectal bleeding.      Her last colonoscopy was 2/6/24 by Dr. Sawyer that revealed a HP x 1 with a 5 yr recall  Past Medical History:   Diagnosis Date    Allergic rhinitis 1980    Anxiety with depression     Arm fracture, left     x2    Arthritis     Callus     Colon polyp     Degenerative joint disease     Disease of thyroid gland 1989    Encounter for immunization 10/18/2022    Fibromyalgia     GERD (gastroesophageal reflux disease)     Hemorrhoids     Hypertension     Hypothyroidism     Kidney stones     Low back pain     Neurological disorder 1998    Migraine    Rheumatoid arthritis (HCC) 1999    Seasonal allergies     Seizure disorder (HCC)      Past Surgical History:   Procedure Laterality Date    COLONOSCOPY      DILATION AND CURETTAGE OF UTERUS      NECK SURGERY      Plate    ORIF FOREARM FRACTURE Left     SHOULDER SURGERY      x2    TOENAIL EXCISION  1990    pinkie toes    TONSILLECTOMY      UPPER GASTROINTESTINAL ENDOSCOPY         Current Outpatient Medications:     acyclovir (ZOVIRAX) 5 % ointment, Apply  topically 3 (three) times a day, Disp: 5 g, Rfl: 1    albuterol (PROVENTIL HFA,VENTOLIN HFA) 90 mcg/act inhaler, Inhale 2 puffs every 6 (six) hours as needed for wheezing, Disp: 8 g, Rfl: 1    Cholecalciferol (Vitamin D3) 50 MCG (2000 UT) TABS, Take 2,000 Units by mouth in the morning, Disp: , Rfl:     DULoxetine (CYMBALTA) 30 mg delayed release capsule, TAKE 1 CAPSULE EVERY MORNING, Disp: 90 capsule, Rfl: 1    HYDROcodone-acetaminophen (NORCO) 5-325 mg per tablet, Take 1 tablet by mouth 2 (two) times a day as needed for pain Max Daily Amount: 2 tablets, Disp: 60 tablet, Rfl: 0    ipratropium (ATROVENT) 0.03 % nasal spray, 2 sprays into each nostril every 12 (twelve) hours, Disp: 90 mL, Rfl: 1    levETIRAcetam (KEPPRA) 500 mg tablet, TAKE 1 TABLET TWICE DAILY, Disp: 180 tablet, Rfl: 1    levothyroxine 100 mcg tablet, TAKE 1 TABLET EVERY MORNING, Disp: 90 tablet, Rfl: 1    Magnesium 400 MG TABS, Take 1 tablet (400 mg total) by mouth in the morning, Disp: 90 tablet, Rfl: 0    montelukast (SINGULAIR) 10 mg tablet, TAKE 1 TABLET EVERY MORNING, Disp: 90 tablet, Rfl: 1    pantoprazole (PROTONIX) 40 mg tablet, TAKE 1 TABLET EVERY DAY, Disp: 90 tablet, Rfl: 1    triamcinolone (KENALOG) 0.1 % cream, Apply topically 2 (two) times a day, Disp: 45 g, Rfl: 1    vitamin B-12 (VITAMIN B-12) 1,000 mcg tablet, Take 1 tablet (1,000 mcg total) by mouth daily, Disp: 90 tablet, Rfl: 1  Allergies as of 11/14/2024 - Reviewed 11/14/2024   Allergen Reaction Noted    Beta adrenergic blockers Shortness Of Breath 09/16/2022    Sulfamethoxazole-trimethoprim Shortness Of Breath 09/16/2022    Meloxicam Confusion 09/16/2022     Review of Systems   All other systems reviewed and are negative.    There were no vitals filed for this visit.  Physical Exam  Constitutional:       Appearance: Normal appearance.   HENT:      Head: Normocephalic and atraumatic.   Eyes:      Extraocular Movements: Extraocular movements intact.      Pupils: Pupils are equal,  round, and reactive to light.   Genitourinary:     Comments: Right-sided perianal skin tag with narrow base  Musculoskeletal:         General: Normal range of motion.   Skin:     General: Skin is warm and dry.   Neurological:      General: No focal deficit present.      Mental Status: She is alert and oriented to person, place, and time.   Psychiatric:         Mood and Affect: Mood normal.         Behavior: Behavior normal.         Thought Content: Thought content normal.         Judgment: Judgment normal.     Anal Procedures    Performed by: Maxim Marion MD  Authorized by: Maxim Marion MD    Universal Protocol:     Verbal consent obtained?: Yes      Risks and benefits: Risks, benefits and alternatives were discussed      Consent given by:  Patient  A time out verifies correct patient, procedure, equipment, support staff and site/side marked as required:   Procedure Type: excision of single external papillae or tag, anus    Patient Position:  Jackknife prone  Excision Details:   Destruction method: surgical removal    Width of defect (cm):  0.3  Patient tolerance:  Patient tolerated the procedure well with no immediate complications  Additiional Procedure Intervention Details:  After local field block, subcentimeter perianal skin tag with narrow base was excised.  Bovie cautery used for hemostasis.  Patient tolerated procedure well.

## 2024-11-14 ENCOUNTER — CONSULT (OUTPATIENT)
Age: 74
End: 2024-11-14
Payer: MEDICARE

## 2024-11-14 VITALS — HEIGHT: 65 IN | WEIGHT: 229 LBS | BODY MASS INDEX: 38.15 KG/M2

## 2024-11-14 DIAGNOSIS — K64.4 ANAL SKIN TAG: ICD-10-CM

## 2024-11-14 DIAGNOSIS — K64.4 SKIN TAG OF ANUS: Primary | ICD-10-CM

## 2024-11-14 PROCEDURE — 99203 OFFICE O/P NEW LOW 30 MIN: CPT | Performed by: COLON & RECTAL SURGERY

## 2024-11-14 PROCEDURE — 46220 EXCISE ANAL EXT TAG/PAPILLA: CPT | Performed by: COLON & RECTAL SURGERY

## 2024-11-14 NOTE — ASSESSMENT & PLAN NOTE
Patient presents for evaluation of perianal skin tag.  Patient notes he has intermittent rectal bleeding she has been treated with rubber band ligation in the past.  She has a perianal skin tag which she is concerned gives her difficult time with hygiene.  Examination shows a narrow base skin tag in the right anterior lateral location.  We discussed risks and benefits of excision.  I am unsure if this will give her true benefit but is relatively straightforward and should be low risk as well.  With this patient.  Excision.  This was performed in the office today.  Plan will be for follow-up as needed.

## 2024-11-15 PROCEDURE — 88304 TISSUE EXAM BY PATHOLOGIST: CPT | Performed by: PATHOLOGY

## 2024-11-21 ENCOUNTER — RESULTS FOLLOW-UP (OUTPATIENT)
Age: 74
End: 2024-11-21

## 2024-11-21 DIAGNOSIS — G89.4 CHRONIC PAIN SYNDROME: ICD-10-CM

## 2024-11-21 PROCEDURE — 88304 TISSUE EXAM BY PATHOLOGIST: CPT | Performed by: PATHOLOGY

## 2024-11-21 RX ORDER — HYDROCODONE BITARTRATE AND ACETAMINOPHEN 5; 325 MG/1; MG/1
1 TABLET ORAL 2 TIMES DAILY PRN
Qty: 60 TABLET | Refills: 0 | Status: SHIPPED | OUTPATIENT
Start: 2024-11-21

## 2024-11-21 NOTE — LETTER
November 21, 2024     Maryanne Marcos  [address] [address]      Dear Ms. Marcos:    Below are the results from your recent visit: 11/14/2024    Resulted Orders   Tissue Exam   Result Value Ref Range    Case Report       Surgical Pathology Report                         Case: [id number]                                  Authorizing Provider:  Maxim Marion MD   Collected:           11/15/2024 0821              Ordering Location:     [facility] Colon and Rectal Received:            11/15/2024 0821                                     [facility] [place]                                                               Pathologist:           Cesilia Keen DO                                                     Specimen:    Anus                                                                                       Final Diagnosis       A. Anus, Excision:  - Fibroepithelial polyp.      Additional Information       All reported additional testing was performed with appropriately reactive controls.  These tests were developed and their performance characteristics determined by [facility] or appropriate performing facility, though some tests may be performed on tissues which have not been validated for performance characteristics (such as staining performed on alcohol exposed cell blocks and decalcified tissues).  Results should be interpreted with caution and in the context of the patients’ clinical condition. These tests may not be cleared or approved by the U.S. Food and Drug Administration, though the FDA has determined that such clearance or approval is not necessary. These tests are used for clinical purposes and they should not be regarded as investigational or for research. This laboratory has been approved by CLIA 88, designated as a high-complexity laboratory and is qualified to perform these tests.      Interpretation performed at [facility], [facility]  "[facility] [address]      Gross Description       A. The specimen is received in formalin, labeled with the patient's name and hospital number, and is designated \" anus\".  The specimen consists of a tan, nodular and pedunculated portion of skin measuring 1.2 x 0.8 x 0.7 cm.  The apparent margin of resection is inked green.  The specimen is trisected perpendicular to the margin revealing tan cut surfaces.  The specimen is entirely submitted between sponges in 1 cassette.    Note: The estimated total formalin fixation time based upon information provided by the submitting clinician and the standard processing schedule is over 72 hours.  RRavotti       The test results shown above are benign.     If you have any questions or concerns, please don't hesitate to call.         Sincerely,        Maxim Marion MD  "

## 2024-11-22 NOTE — PROGRESS NOTES
Office Visit Note  24     Maryanne Marcos 74 y.o. female MRN: [id number]  : 1950    Assessment:     1. Chronic pain syndrome  Assessment & Plan:  Currently on hydrocodone 5/325 twice daily as needed  2. Acquired hypothyroidism  Assessment & Plan:  Continue with levothyroxine 100 mcg daily follow-up with the repeat TSH level at a later date.  3. Continuous opioid dependence (HCC)  Assessment & Plan:  Patient has difficulty with day-to-day activities has to be on pain medications going for steroid injection as soon as possible in the back.  4. Skin tag of anus  Assessment & Plan:  Reviewed the reports from the surgeon she has a partial skin tag which has been removed stable at present time still has some discomfort feeling.  5. Encounter for screening mammogram for breast cancer  -     Mammo screening bilateral w 3d and cad; Future; Expected date: 2024  6. Allergy, subsequent encounter  -     ipratropium (ATROVENT) 0.03 % nasal spray; 2 sprays into each nostril every 12 (twelve) hours  7. Carson's esophagus without dysplasia  8. Chronic bilateral low back pain without sciatica  Assessment & Plan:  Patient is currently taking hydrocodone for pain she is going to be follow-up with the spine physician for an injection as soon as possible as soon as she gets the appointment.  Movements of the spine causing discomfort with day-to-day activities causing discomfort and pain in the back.  9. Fibromyalgia  10. Gastroesophageal reflux disease without esophagitis  11. Hemorrhoids, unspecified hemorrhoid type  Assessment & Plan:  Reviewed the reports of the gastroenterologist as well as the surgeon patient had a skin tag which has been removed by the surgeon  12. Herpes labialis  13. Migraine without status migrainosus, not intractable, unspecified migraine type  14. Obesity (BMI 35.0-39.9 without comorbidity)  15. Seizure disorder (HCC)  16. Age-related osteoporosis without current pathological fracture  -      DXA bone density spine hip and pelvis; Future; Expected date: 12/25/2024             Discussion Summary and Plan:  Today's care plan and medications were reviewed with patient in detail and all their questions answered to their satisfaction.    Chief Complaint   Patient presents with   • Follow-up      Subjective:  Patient is coming here for a follow-up evaluation continues to experience pain in the low back area now it is getting worse not able to bend not able to do her day-to-day activities.  Currently taking pain medication hydrocodone.  She is making an appointment with the spine and pain management physician trying to see if she can get an earlier for possible steroid injections which she has received a couple of years back.  Medication reviewed labs reviewed.      The following portions of the patient's history were reviewed and updated as appropriate: allergies, current medications, past family history, past medical history, past social history, past surgical history and problem list.    Review of Systems   Constitutional:  Negative for chills and fever.   HENT:  Negative for ear pain and sore throat.    Eyes:  Negative for pain and visual disturbance.   Respiratory:  Negative for cough and shortness of breath.    Cardiovascular:  Negative for chest pain and palpitations.   Gastrointestinal:  Negative for abdominal pain and vomiting.   Genitourinary:  Negative for dysuria and hematuria.   Musculoskeletal:  Positive for arthralgias and back pain.   Skin:  Negative for color change and rash.   Neurological:  Negative for seizures and syncope.   All other systems reviewed and are negative.        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   • Nail deformity   • Chronic bilateral low back pain without sciatica   • Hemorrhoids   • Herpes labialis   • Skin tag of anus     Past Medical History:   Diagnosis Date   • Allergic rhinitis    • Anxiety with depression    • Arm fracture, left     x2   • Arthritis    • Callus    • Colon polyp    • Degenerative joint disease    • Disease of thyroid gland    • Encounter for immunization 10/18/2022   • Fibromyalgia    • GERD (gastroesophageal reflux disease)    • Hemorrhoids    • Hypertension    • Hypothyroidism    • Kidney stones    • Low back pain    • Neurological disorder     Migraine   • Rheumatoid arthritis (HCC)    • Seasonal allergies    • Seizure disorder (HCC)      Past Surgical History:   Procedure Laterality Date   • COLONOSCOPY     • DILATION AND CURETTAGE OF UTERUS     • NECK SURGERY      Plate   • ORIF FOREARM FRACTURE Left    • SHOULDER SURGERY      x2   • TOENAIL EXCISION      pinkie toes   • TONSILLECTOMY     • UPPER GASTROINTESTINAL ENDOSCOPY       Social History     Substance and Sexual Activity   Alcohol Use Not Currently    Comment: glass of wine with dinner out, 1x a week stopped      Social History     Substance and Sexual Activity   Drug Use Never     Social History     Tobacco Use   Smoking Status Former   • Current packs/day: 0.00   • Average packs/day: 1 pack/day for 35.0 years (35.0 ttl pk-yrs)   • Types: Cigarettes   • Quit date:    • Years since quittin.9   • Passive exposure: Past   Smokeless Tobacco Never     Family History   Problem Relation Age of Onset   • Stroke Mother    • Arthritis Mother    • Heart disease Father    • Colon cancer Maternal Grandmother      Health Maintenance Due   Topic   • Hepatitis C Screening    • Breast Cancer Screening: Mammogram    • Osteoporosis Screening    • Zoster  "Vaccine (1 of 2)   • Pneumococcal Vaccine: 65+ Years (1 of 1 - PCV)   • COVID-19 Vaccine (3 - 2024-25 season)   • Depression Screening       Meds/Allergies       Current Outpatient Medications:   •  acyclovir (ZOVIRAX) 5 % ointment, Apply topically 3 (three) times a day, Disp: 5 g, Rfl: 1  •  albuterol (PROVENTIL HFA,VENTOLIN HFA) 90 mcg/act inhaler, Inhale 2 puffs every 6 (six) hours as needed for wheezing, Disp: 8 g, Rfl: 1  •  Cholecalciferol (Vitamin D3) 50 MCG (2000 UT) TABS, Take 2,000 Units by mouth in the morning, Disp: , Rfl:   •  DULoxetine (CYMBALTA) 30 mg delayed release capsule, TAKE 1 CAPSULE EVERY MORNING, Disp: 90 capsule, Rfl: 1  •  HYDROcodone-acetaminophen (NORCO) 5-325 mg per tablet, Take 1 tablet by mouth 2 (two) times a day as needed for pain Max Daily Amount: 2 tablets, Disp: 60 tablet, Rfl: 0  •  ipratropium (ATROVENT) 0.03 % nasal spray, 2 sprays into each nostril every 12 (twelve) hours, Disp: 90 mL, Rfl: 1  •  levETIRAcetam (KEPPRA) 500 mg tablet, TAKE 1 TABLET TWICE DAILY, Disp: 180 tablet, Rfl: 1  •  levothyroxine 100 mcg tablet, TAKE 1 TABLET EVERY MORNING, Disp: 90 tablet, Rfl: 1  •  Magnesium 400 MG TABS, Take 1 tablet (400 mg total) by mouth in the morning, Disp: 90 tablet, Rfl: 0  •  montelukast (SINGULAIR) 10 mg tablet, TAKE 1 TABLET EVERY MORNING, Disp: 90 tablet, Rfl: 1  •  pantoprazole (PROTONIX) 40 mg tablet, TAKE 1 TABLET EVERY DAY, Disp: 90 tablet, Rfl: 1  •  triamcinolone (KENALOG) 0.1 % cream, Apply topically 2 (two) times a day, Disp: 45 g, Rfl: 1  •  vitamin B-12 (VITAMIN B-12) 1,000 mcg tablet, Take 1 tablet (1,000 mcg total) by mouth daily, Disp: 90 tablet, Rfl: 1      Objective:    Vitals:   /80 (BP Location: Right arm, Patient Position: Sitting, Cuff Size: Standard)   Pulse 80   Ht 5' 5\" (1.651 m)   Wt 103 kg (227 lb 3.2 oz)   SpO2 94%   BMI 37.81 kg/m²   Body mass index is 37.81 kg/m².  Vitals:    11/25/24 1057   Weight: 103 kg (227 lb 3.2 oz) "       Physical Exam  Vitals and nursing note reviewed.   Constitutional:       Appearance: Normal appearance.   Cardiovascular:      Rate and Rhythm: Normal rate and regular rhythm.      Heart sounds: Normal heart sounds.   Pulmonary:      Effort: Pulmonary effort is normal.      Breath sounds: Normal breath sounds.   Abdominal:      Palpations: Abdomen is soft.   Musculoskeletal:      Cervical back: Normal range of motion and neck supple.      Right lower leg: No edema.      Left lower leg: No edema.      Comments: Lumbar spine movements causing discomfort and pain   Neurological:      Mental Status: She is alert.       Lab Review   Consult on 11/14/2024   Component Date Value Ref Range Status   • Case Report 11/15/2024    Final                    Value:Surgical Pathology Report                         Case: [id number]                                  Authorizing Provider:  Maxim Marion MD   Collected:           11/15/2024 0821              Ordering Location:     [facility] Colon and Rectal Received:            11/15/2024 0821                                     Sur[facility] [place]                                                               Pathologist:           Cesilia Keen DO                                                     Specimen:    Anus                                                                                      • Final Diagnosis 11/15/2024    Final                    Value:A. Anus, Excision:  - Fibroepithelial polyp.     • Additional Information 11/15/2024    Final                    Value:All reported additional testing was performed with appropriately reactive controls.  These tests were developed and their performance characteristics determined by [facility] or appropriate performing facility, though some tests may be performed on tissues which have not been validated for performance characteristics (such as staining performed on alcohol exposed cell blocks  "and decalcified tissues).  Results should be interpreted with caution and in the context of the patients’ clinical condition. These tests may not be cleared or approved by the U.S. Food and Drug Administration, though the FDA has determined that such clearance or approval is not necessary. These tests are used for clinical purposes and they should not be regarded as investigational or for research. This laboratory has been approved by IA 88, designated as a high-complexity laboratory and is qualified to perform these tests.      Interpretation performed at [facility], [address] [place] [id number]     • Gross Description 11/15/2024    Final                    Value:A. The specimen is received in formalin, labeled with the patient's name and hospital number, and is designated \" anus\".  The specimen consists of a tan, nodular and pedunculated portion of skin measuring 1.2 x 0.8 x 0.7 cm.  The apparent margin of resection is inked green.  The specimen is trisected perpendicular to the margin revealing tan cut surfaces.  The specimen is entirely submitted between sponges in 1 cassette.    Note: The estimated total formalin fixation time based upon information provided by the submitting clinician and the standard processing schedule is over 72 hours.  Surendra Simeon MD        \"This note has been constructed using a voice recognition system.Therefore there may be syntax, spelling, and/or grammatical errors. Please call if you have any questions. \"  "

## 2024-11-25 ENCOUNTER — OFFICE VISIT (OUTPATIENT)
Dept: FAMILY MEDICINE CLINIC | Facility: CLINIC | Age: 74
End: 2024-11-25
Payer: MEDICARE

## 2024-11-25 VITALS
DIASTOLIC BLOOD PRESSURE: 80 MMHG | SYSTOLIC BLOOD PRESSURE: 129 MMHG | WEIGHT: 227.2 LBS | HEIGHT: 65 IN | OXYGEN SATURATION: 94 % | HEART RATE: 80 BPM | BODY MASS INDEX: 37.85 KG/M2

## 2024-11-25 DIAGNOSIS — G89.29 CHRONIC BILATERAL LOW BACK PAIN WITHOUT SCIATICA: ICD-10-CM

## 2024-11-25 DIAGNOSIS — K21.9 GASTROESOPHAGEAL REFLUX DISEASE WITHOUT ESOPHAGITIS: ICD-10-CM

## 2024-11-25 DIAGNOSIS — K22.70 BARRETT'S ESOPHAGUS WITHOUT DYSPLASIA: ICD-10-CM

## 2024-11-25 DIAGNOSIS — E66.9 OBESITY (BMI 35.0-39.9 WITHOUT COMORBIDITY): ICD-10-CM

## 2024-11-25 DIAGNOSIS — K64.9 HEMORRHOIDS, UNSPECIFIED HEMORRHOID TYPE: ICD-10-CM

## 2024-11-25 DIAGNOSIS — E03.9 ACQUIRED HYPOTHYROIDISM: ICD-10-CM

## 2024-11-25 DIAGNOSIS — F11.20 CONTINUOUS OPIOID DEPENDENCE (HCC): ICD-10-CM

## 2024-11-25 DIAGNOSIS — G43.909 MIGRAINE WITHOUT STATUS MIGRAINOSUS, NOT INTRACTABLE, UNSPECIFIED MIGRAINE TYPE: ICD-10-CM

## 2024-11-25 DIAGNOSIS — G89.4 CHRONIC PAIN SYNDROME: Primary | ICD-10-CM

## 2024-11-25 DIAGNOSIS — Z12.31 ENCOUNTER FOR SCREENING MAMMOGRAM FOR BREAST CANCER: ICD-10-CM

## 2024-11-25 DIAGNOSIS — B00.1 HERPES LABIALIS: ICD-10-CM

## 2024-11-25 DIAGNOSIS — M54.50 CHRONIC BILATERAL LOW BACK PAIN WITHOUT SCIATICA: ICD-10-CM

## 2024-11-25 DIAGNOSIS — G40.909 SEIZURE DISORDER (HCC): ICD-10-CM

## 2024-11-25 DIAGNOSIS — M79.7 FIBROMYALGIA: ICD-10-CM

## 2024-11-25 DIAGNOSIS — T78.40XD ALLERGY, SUBSEQUENT ENCOUNTER: ICD-10-CM

## 2024-11-25 DIAGNOSIS — K64.4 SKIN TAG OF ANUS: ICD-10-CM

## 2024-11-25 DIAGNOSIS — M81.0 AGE-RELATED OSTEOPOROSIS WITHOUT CURRENT PATHOLOGICAL FRACTURE: ICD-10-CM

## 2024-11-25 PROCEDURE — G2211 COMPLEX E/M VISIT ADD ON: HCPCS | Performed by: INTERNAL MEDICINE

## 2024-11-25 PROCEDURE — 99213 OFFICE O/P EST LOW 20 MIN: CPT | Performed by: INTERNAL MEDICINE

## 2024-11-25 RX ORDER — IPRATROPIUM BROMIDE 21 UG/1
2 SPRAY, METERED NASAL EVERY 12 HOURS
Qty: 90 ML | Refills: 1 | Status: SHIPPED | OUTPATIENT
Start: 2024-11-25

## 2024-11-25 NOTE — ASSESSMENT & PLAN NOTE
Patient has difficulty with day-to-day activities has to be on pain medications going for steroid injection as soon as possible in the back.

## 2024-11-25 NOTE — ASSESSMENT & PLAN NOTE
Reviewed the reports of the gastroenterologist as well as the surgeon patient had a skin tag which has been removed by the surgeon

## 2024-11-25 NOTE — ASSESSMENT & PLAN NOTE
Reviewed the reports from the surgeon she has a partial skin tag which has been removed stable at present time still has some discomfort feeling.

## 2024-11-25 NOTE — ASSESSMENT & PLAN NOTE
Patient is currently taking hydrocodone for pain she is going to be follow-up with the spine physician for an injection as soon as possible as soon as she gets the appointment.  Movements of the spine causing discomfort with day-to-day activities causing discomfort and pain in the back.

## 2024-12-17 DIAGNOSIS — G89.4 CHRONIC PAIN SYNDROME: ICD-10-CM

## 2024-12-18 RX ORDER — HYDROCODONE BITARTRATE AND ACETAMINOPHEN 5; 325 MG/1; MG/1
1 TABLET ORAL 2 TIMES DAILY PRN
Qty: 60 TABLET | Refills: 0 | Status: SHIPPED | OUTPATIENT
Start: 2024-12-18

## 2024-12-24 ENCOUNTER — OFFICE VISIT (OUTPATIENT)
Dept: FAMILY MEDICINE CLINIC | Facility: CLINIC | Age: 74
End: 2024-12-24
Payer: MEDICARE

## 2024-12-24 VITALS
WEIGHT: 229.5 LBS | DIASTOLIC BLOOD PRESSURE: 74 MMHG | HEART RATE: 100 BPM | SYSTOLIC BLOOD PRESSURE: 127 MMHG | BODY MASS INDEX: 38.19 KG/M2 | OXYGEN SATURATION: 98 %

## 2024-12-24 DIAGNOSIS — F11.20 CONTINUOUS OPIOID DEPENDENCE (HCC): ICD-10-CM

## 2024-12-24 DIAGNOSIS — K22.70 BARRETT'S ESOPHAGUS WITHOUT DYSPLASIA: ICD-10-CM

## 2024-12-24 DIAGNOSIS — G89.4 CHRONIC PAIN SYNDROME: Primary | ICD-10-CM

## 2024-12-24 DIAGNOSIS — T78.40XD ALLERGY, SUBSEQUENT ENCOUNTER: ICD-10-CM

## 2024-12-24 DIAGNOSIS — G89.29 CHRONIC BILATERAL LOW BACK PAIN WITHOUT SCIATICA: ICD-10-CM

## 2024-12-24 DIAGNOSIS — M20.011 ACQUIRED MALLET DEFORMITY OF RIGHT RING FINGER: ICD-10-CM

## 2024-12-24 DIAGNOSIS — M54.50 CHRONIC BILATERAL LOW BACK PAIN WITHOUT SCIATICA: ICD-10-CM

## 2024-12-24 DIAGNOSIS — E03.9 ACQUIRED HYPOTHYROIDISM: ICD-10-CM

## 2024-12-24 PROCEDURE — 99213 OFFICE O/P EST LOW 20 MIN: CPT | Performed by: INTERNAL MEDICINE

## 2024-12-24 PROCEDURE — G2211 COMPLEX E/M VISIT ADD ON: HCPCS | Performed by: INTERNAL MEDICINE

## 2024-12-24 NOTE — PROGRESS NOTES
Office Visit Note  24     Maryanne Marcos 74 y.o. female MRN: [id number]  : 1950    Assessment:     1. Chronic pain syndrome  Assessment & Plan:  Currently patient is on hydrocodone twice daily as needed continue the same  2. Chronic bilateral low back pain without sciatica  Assessment & Plan:  Patient is on hydrocodone for the pain she was in the past followed by the spine physician also and had injections in the past.  After the holidays she is going to make another appointment with them  3. Continuous opioid dependence (HCC)  Assessment & Plan:  Patient having increasing difficulty with day-to-day activities without taking the pain medication  4. Acquired hypothyroidism  Assessment & Plan:  Last TSH level came back normal 2.157 we will continue with the same dose of levothyroxine 100 mcg daily  5. Allergy, subsequent encounter  Assessment & Plan:  On Singulair we will continue her she also has Atrovent nasal spray  6. Carson's esophagus without dysplasia  Assessment & Plan:  Continue pantoprazole  7. 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    Chief Complaint   Patient presents with    Follow-up      Subjective:  Patient has has come for evaluation regarding symptoms of chronic low back pain.  Also she has these episodes.  She feels very hot sweaty.  No chest pains palpitation shortness of breath.  History of anxiety disorder.  Medications reviewed labs reviewed patient is currently on hydrocodone for the pain.  She is also on Cymbalta        The following portions of the patient's history were reviewed and updated as appropriate: allergies, current medications, past family history, past medical history, past social history, past surgical history and problem list.    Review of Systems   Constitutional:  Negative for chills and  fever.   HENT:  Negative for ear pain and sore throat.    Eyes:  Negative for pain and visual disturbance.   Respiratory:  Negative for cough and shortness of breath.    Cardiovascular:  Negative for chest pain and palpitations.   Gastrointestinal:  Negative for abdominal pain and vomiting.   Genitourinary:  Negative for dysuria and hematuria.   Musculoskeletal:  Positive for arthralgias and back pain.   Skin:  Negative for color change and rash.   Neurological:  Negative for seizures and syncope.   All other systems reviewed and are negative.        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    Nail deformity    Chronic bilateral low back pain without sciatica    Hemorrhoids    Herpes labialis    Skin tag of anus     Past Medical History:   Diagnosis Date    Allergic rhinitis 1980    Anxiety with depression     Arm fracture, left     x2    Arthritis     Callus     Colon polyp     Degenerative joint disease     Disease of thyroid gland 1989    Encounter for immunization 10/18/2022    Fibromyalgia     GERD (gastroesophageal reflux disease)     Hemorrhoids     Hypertension     Hypothyroidism     Kidney stones     Low back pain     Neurological disorder 1998    Migraine    Rheumatoid arthritis (HCC) 1999    Seasonal allergies     Seizure disorder (HCC)      Past Surgical History:   Procedure Laterality Date    COLONOSCOPY      DILATION AND CURETTAGE OF UTERUS      NECK SURGERY      Plate    ORIF FOREARM FRACTURE Left     SHOULDER SURGERY      x2    TOENAIL EXCISION  1990    pinkie toes    TONSILLECTOMY      UPPER  GASTROINTESTINAL ENDOSCOPY       Social History     Substance and Sexual Activity   Alcohol Use Not Currently    Comment: glass of wine with dinner out, 1x a week stopped      Social History     Substance and Sexual Activity   Drug Use Never     Social History     Tobacco Use   Smoking Status Former    Current packs/day: 0.00    Average packs/day: 1 pack/day for 35.0 years (35.0 ttl pk-yrs)    Types: Cigarettes    Quit date:     Years since quittin.0    Passive exposure: Past   Smokeless Tobacco Never     Family History   Problem Relation Age of Onset    Stroke Mother     Arthritis Mother     Heart disease Father     Colon cancer Maternal Grandmother      Health Maintenance Due   Topic    Hepatitis C Screening     Breast Cancer Screening: Mammogram     Osteoporosis Screening     Zoster Vaccine (1 of 2)    Pneumococcal Vaccine: 65+ Years (1 of 1 - PCV)    COVID-19 Vaccine (3 - 2024-25 season)    Depression Screening       Meds/Allergies       Current Outpatient Medications:     acyclovir (ZOVIRAX) 5 % ointment, Apply topically 3 (three) times a day, Disp: 5 g, Rfl: 1    albuterol (PROVENTIL HFA,VENTOLIN HFA) 90 mcg/act inhaler, Inhale 2 puffs every 6 (six) hours as needed for wheezing, Disp: 8 g, Rfl: 1    Cholecalciferol (Vitamin D3) 50 MCG (2000 UT) TABS, Take 2,000 Units by mouth in the morning, Disp: , Rfl:     DULoxetine (CYMBALTA) 30 mg delayed release capsule, TAKE 1 CAPSULE EVERY MORNING, Disp: 90 capsule, Rfl: 1    HYDROcodone-acetaminophen (NORCO) 5-325 mg per tablet, Take 1 tablet by mouth 2 (two) times a day as needed for pain Max Daily Amount: 2 tablets, Disp: 60 tablet, Rfl: 0    ipratropium (ATROVENT) 0.03 % nasal spray, 2 sprays into each nostril every 12 (twelve) hours, Disp: 90 mL, Rfl: 1    levETIRAcetam (KEPPRA) 500 mg tablet, TAKE 1 TABLET TWICE DAILY, Disp: 180 tablet, Rfl: 1    levothyroxine 100 mcg tablet, TAKE 1 TABLET EVERY MORNING, Disp: 90 tablet, Rfl: 1    Magnesium 400 MG  TABS, Take 1 tablet (400 mg total) by mouth in the morning, Disp: 90 tablet, Rfl: 0    montelukast (SINGULAIR) 10 mg tablet, TAKE 1 TABLET EVERY MORNING, Disp: 90 tablet, Rfl: 1    pantoprazole (PROTONIX) 40 mg tablet, TAKE 1 TABLET EVERY DAY, Disp: 90 tablet, Rfl: 1    triamcinolone (KENALOG) 0.1 % cream, Apply topically 2 (two) times a day, Disp: 45 g, Rfl: 1    vitamin B-12 (VITAMIN B-12) 1,000 mcg tablet, Take 1 tablet (1,000 mcg total) by mouth daily, Disp: 90 tablet, Rfl: 1      Objective:    Vitals:   /74 (BP Location: Right arm, Patient Position: Sitting, Cuff Size: Large)   Pulse 100   Wt 104 kg (229 lb 8 oz)   SpO2 98%   BMI 38.19 kg/m²   Body mass index is 38.19 kg/m².  Vitals:    12/24/24 1226   Weight: 104 kg (229 lb 8 oz)       Physical Exam  Vitals and nursing note reviewed.   Constitutional:       Appearance: Normal appearance.   Cardiovascular:      Rate and Rhythm: Normal rate and regular rhythm.      Heart sounds: Normal heart sounds.   Pulmonary:      Effort: Pulmonary effort is normal.      Breath sounds: Normal breath sounds.   Abdominal:      General: Abdomen is flat.      Palpations: Abdomen is soft.   Musculoskeletal:         General: Normal range of motion.      Cervical back: Normal range of motion and neck supple.      Right lower leg: No edema.      Left lower leg: No edema.   Skin:     General: Skin is warm and dry.   Neurological:      Mental Status: She is alert and oriented to person, place, and time. Mental status is at baseline.   Psychiatric:         Mood and Affect: Mood normal.         Behavior: Behavior normal.         Lab Review   Consult on 11/14/2024   Component Date Value Ref Range Status    Case Report 11/15/2024    Final                    Value:Surgical Pathology Report                         Case: [id number]                                  Authorizing Provider:  Maxim Marion MD   Collected:           11/15/2024 0821              Ordering Location:    "  [facility]al Received:            11/15/2024 0821                                     [facility]                                                               Pathologist:           Cesilia Keen DO                                                     Specimen:    Anus                                                                                       Final Diagnosis 11/15/2024    Final                    Value:A. Anus, Excision:  - Fibroepithelial polyp.      Additional Information 11/15/2024    Final                    Value:All reported additional testing was performed with appropriately reactive controls.  These tests were developed and their performance characteristics determined by [facility] or appropriate performing facility, though some tests may be performed on tissues which have not been validated for performance characteristics (such as staining performed on alcohol exposed cell blocks and decalcified tissues).  Results should be interpreted with caution and in the context of the patients’ clinical condition. These tests may not be cleared or approved by the U.S. Food and Drug Administration, though the FDA has determined that such clearance or approval is not necessary. These tests are used for clinical purposes and they should not be regarded as investigational or for research. This laboratory has been approved by [place] 88, designated as a high-complexity laboratory and is qualified to perform these tests.      Interpretation performed at [facility], [facility] [address]      Gross Description 11/15/2024    Final                    Value:A. The specimen is received in formalin, labeled with the patient's name and hospital number, and is designated \" anus\".  The specimen consists of a tan, nodular and pedunculated portion of skin measuring 1.2 x 0.8 x 0.7 cm.  The apparent margin of resection is inked green.  " "The specimen is trisected perpendicular to the margin revealing tan cut surfaces.  The specimen is entirely submitted between sponges in 1 cassette.    Note: The estimated total formalin fixation time based upon information provided by the submitting clinician and the standard processing schedule is over 72 hours.  Surendra Simeon MD        \"This note has been constructed using a voice recognition system.Therefore there may be syntax, spelling, and/or grammatical errors. Please call if you have any questions. \"  "

## 2024-12-24 NOTE — ASSESSMENT & PLAN NOTE
Patient is on hydrocodone for the pain she was in the past followed by the spine physician also and had injections in the past.  After the holidays she is going to make another appointment with them

## 2024-12-24 NOTE — ASSESSMENT & PLAN NOTE
Patient having increasing difficulty with day-to-day activities without taking the pain medication

## 2024-12-24 NOTE — ASSESSMENT & PLAN NOTE
Last TSH level came back normal 2.157 we will continue with the same dose of levothyroxine 100 mcg daily

## 2025-01-20 DIAGNOSIS — G89.4 CHRONIC PAIN SYNDROME: ICD-10-CM

## 2025-01-20 RX ORDER — HYDROCODONE BITARTRATE AND ACETAMINOPHEN 5; 325 MG/1; MG/1
1 TABLET ORAL 2 TIMES DAILY PRN
Qty: 60 TABLET | Refills: 0 | Status: SHIPPED | OUTPATIENT
Start: 2025-01-20 | End: 2025-01-23 | Stop reason: SDUPTHER

## 2025-01-22 NOTE — PROGRESS NOTES
Office Visit Note  25     Maryanne Marcos 74 y.o. female MRN: [id number]  : 1950    Assessment:     1. Chronic pain syndrome  Assessment & Plan:  Continue with hydrocodone for pain management  Orders:  -     HYDROcodone-acetaminophen (NORCO) 5-325 mg per tablet; Take 1 tablet by mouth 2 (two) times a day as needed for pain Max Daily Amount: 2 tablets  2. Continuous opioid dependence (HCC)  Assessment & Plan:  Currently taking hydrocodone twice a day without taking pain medication has been difficulty with day-to-day activities  3. Chronic bilateral low back pain without sciatica  Assessment & Plan:  Patient continues to experience pain in the low back area supposed to be seen by the spine physician for injection but has to be postponed because of her son getting surgery meanwhile we will renew her pain medication  4. Herpes labialis  Assessment & Plan:  Zovirax ointment was not available we will try her on Zovirax cream  Orders:  -     acyclovir (ZOVIRAX) 5 % cream; Apply topically 2 (two) times a day  5. Acquired hypothyroidism  Assessment & Plan:  Patient with hypothyroidism last TSH level done in the month of September came back at 2.157 normal we will continue with the same dose of levothyroxine 100 mcg daily  6. Seizure disorder (HCC)  Assessment & Plan:  Patient is on Keppra stable  7. Allergy, subsequent encounter  Assessment & Plan:  Patient on Atrovent nasal spray along with the Singulair we will continue with the same  8. Migraine without status migrainosus, not intractable, unspecified migraine type  Assessment & Plan:  Patient with migraine headaches and she has 1 today she will be taking Advil  9. Carson's esophagus without dysplasia  Assessment & Plan:  On pantoprazole continue  10. Diverticulosis  11. Fibromyalgia  Assessment & Plan:  Question side effects with the duloxetine with heart sensation feeling but it has been helping with her fibromyalgia 30 mg daily will try her on 30 mg  every other day and see if it makes any difference.  12. Obesity (BMI 35.0-39.9 without comorbidity)  13. Colon cancer screening  Assessment & Plan:  Patient had a colonoscopy done in 2023 and was recommended to have repeat 1 in 5 years  14. Gastroesophageal reflux disease without esophagitis  Assessment & Plan:  Continue pantoprazole for her GERD symptoms  15. Opioid dependence, uncomplicated (HCC)  Assessment & Plan:  Currently taking hydrocodone twice a day without taking pain medication has been difficulty with day-to-day activities             Discussion Summary and Plan:  Today's care plan and medications were reviewed with patient in detail and all their questions answered to their satisfaction.    Chief Complaint   Patient presents with    Follow-up      Subjective:  Patient is coming here for a follow-up evaluation regarding chronic pain syndrome low back pain DJD and also patient with a history of hypertension seizure disorder GERD migraine headaches hypothyroidism fibromyalgia obesity.    Patient continues to experience pain in the low back area supposed to be seen by the pain and spine management physician but has to postpone it because of her son getting operated.  Patient does get herpes labialis symptoms appointment on Zovirax was not available we will change it to cream.    Medications reviewed labs reviewed        The following portions of the patient's history were reviewed and updated as appropriate: allergies, current medications, past family history, past medical history, past social history, past surgical history and problem list.    Review of Systems   Constitutional:  Negative for chills and fever.   HENT:  Negative for ear pain and sore throat.    Eyes:  Negative for pain and visual disturbance.   Respiratory:  Negative for cough and shortness of breath.    Cardiovascular:  Negative for chest pain and palpitations.   Gastrointestinal:  Negative for abdominal pain and vomiting.    Genitourinary:  Negative for dysuria and hematuria.   Musculoskeletal:  Positive for arthralgias and back pain.   Skin:  Negative for color change and rash.   Neurological:  Negative for seizures and syncope.   All other systems reviewed and are negative.        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    Nail deformity    Chronic bilateral low back pain without sciatica    Hemorrhoids    Herpes labialis    Skin tag of anus     Past Medical History:   Diagnosis Date    Allergic rhinitis 1980    Anxiety with depression     Arm fracture, left     x2    Arthritis     Callus     Colon polyp     Degenerative joint disease     Disease of thyroid gland 1989    Encounter for immunization 10/18/2022    Fibromyalgia     GERD (gastroesophageal reflux disease)     Hemorrhoids     Hypertension     Hypothyroidism     Kidney stones     Low back pain     Neurological disorder 1998    Migraine    Rheumatoid arthritis (HCC) 1999    Seasonal allergies     Seizure disorder (HCC)      Past Surgical History:   Procedure Laterality Date    COLONOSCOPY      DILATION AND CURETTAGE OF UTERUS      NECK SURGERY      Plate    ORIF FOREARM FRACTURE Left     SHOULDER SURGERY      x2    TOENAIL EXCISION  1990    pinkie toes    TONSILLECTOMY      UPPER GASTROINTESTINAL ENDOSCOPY       Social History     Substance and Sexual Activity   Alcohol Use Not Currently    Comment: glass of wine with dinner out, 1x a week stopped 2009     Social History     Substance and Sexual Activity   Drug Use Never     Social History     Tobacco Use    Smoking Status Former    Current packs/day: 0.00    Average packs/day: 1 pack/day for 35.0 years (35.0 ttl pk-yrs)    Types: Cigarettes    Quit date:     Years since quittin.0    Passive exposure: Past   Smokeless Tobacco Never     Family History   Problem Relation Age of Onset    Stroke Mother     Arthritis Mother     Heart disease Father     Colon cancer Maternal Grandmother      Health Maintenance Due   Topic    Hepatitis C Screening     Breast Cancer Screening: Mammogram     Osteoporosis Screening     Zoster Vaccine (1 of 2)    Pneumococcal Vaccine: 65+ Years (1 of 1 - PCV)    COVID-19 Vaccine (3 - 2024-25 season)    Depression Screening       Meds/Allergies       Current Outpatient Medications:     acyclovir (ZOVIRAX) 5 % cream, Apply topically 2 (two) times a day, Disp: 5 g, Rfl: 2    albuterol (PROVENTIL HFA,VENTOLIN HFA) 90 mcg/act inhaler, Inhale 2 puffs every 6 (six) hours as needed for wheezing, Disp: 8 g, Rfl: 1    Cholecalciferol (Vitamin D3) 50 MCG (2000 UT) TABS, Take 2,000 Units by mouth in the morning, Disp: , Rfl:     DULoxetine (CYMBALTA) 30 mg delayed release capsule, TAKE 1 CAPSULE EVERY MORNING, Disp: 90 capsule, Rfl: 1    HYDROcodone-acetaminophen (NORCO) 5-325 mg per tablet, Take 1 tablet by mouth 2 (two) times a day as needed for pain Max Daily Amount: 2 tablets, Disp: 60 tablet, Rfl: 0    ipratropium (ATROVENT) 0.03 % nasal spray, 2 sprays into each nostril every 12 (twelve) hours, Disp: 90 mL, Rfl: 1    levETIRAcetam (KEPPRA) 500 mg tablet, TAKE 1 TABLET TWICE DAILY, Disp: 180 tablet, Rfl: 1    levothyroxine 100 mcg tablet, TAKE 1 TABLET EVERY MORNING, Disp: 90 tablet, Rfl: 1    Magnesium 400 MG TABS, Take 1 tablet (400 mg total) by mouth in the morning, Disp: 90 tablet, Rfl: 0    montelukast (SINGULAIR) 10 mg tablet, TAKE 1 TABLET EVERY MORNING, Disp: 90 tablet, Rfl: 1    pantoprazole (PROTONIX) 40 mg tablet, TAKE 1 TABLET EVERY DAY, Disp: 90 tablet, Rfl: 1    triamcinolone  "(KENALOG) 0.1 % cream, Apply topically 2 (two) times a day, Disp: 45 g, Rfl: 1    vitamin B-12 (VITAMIN B-12) 1,000 mcg tablet, Take 1 tablet (1,000 mcg total) by mouth daily, Disp: 90 tablet, Rfl: 1      Objective:    Vitals:   /76 (BP Location: Right arm, Patient Position: Sitting, Cuff Size: Standard)   Pulse 82   Ht 5' 5\" (1.651 m)   Wt 104 kg (230 lb)   SpO2 97%   BMI 38.27 kg/m²   Body mass index is 38.27 kg/m².  Vitals:    01/23/25 1109   Weight: 104 kg (230 lb)       Physical Exam  Vitals and nursing note reviewed.   Constitutional:       Appearance: Normal appearance.   Cardiovascular:      Rate and Rhythm: Normal rate and regular rhythm.      Heart sounds: Normal heart sounds.   Pulmonary:      Effort: Pulmonary effort is normal.      Breath sounds: Normal breath sounds.   Abdominal:      Palpations: Abdomen is soft.   Musculoskeletal:      Cervical back: Normal range of motion and neck supple.      Right lower leg: No edema.      Left lower leg: No edema.      Comments: Movements of lumbar spine causing discomfort   Skin:     General: Skin is warm and dry.   Neurological:      Mental Status: She is alert and oriented to person, place, and time.   Psychiatric:         Mood and Affect: Mood normal.         Behavior: Behavior normal.         Lab Review   No visits with results within 2 Month(s) from this visit.   Latest known visit with results is:   Consult on 11/14/2024   Component Date Value Ref Range Status    Case Report 11/15/2024    Final                    Value:Surgical Pathology Report                         Case: [id number]                                  Authorizing Provider:  Maxim Marion MD   Collected:           11/15/2024 0821              Ordering Location:     [facility]al Received:            11/15/2024 0821                                     [facility]                                                               Pathologist:           Cesilia DE SOUZA" "DO Leonila                                                     Specimen:    Anus                                                                                       Final Diagnosis 11/15/2024    Final                    Value:A. Anus, Excision:  - Fibroepithelial polyp.      Additional Information 11/15/2024    Final                    Value:All reported additional testing was performed with appropriately reactive controls.  These tests were developed and their performance characteristics determined by [facility] or appropriate performing facility, though some tests may be performed on tissues which have not been validated for performance characteristics (such as staining performed on alcohol exposed cell blocks and decalcified tissues).  Results should be interpreted with caution and in the context of the patients’ clinical condition. These tests may not be cleared or approved by the U.S. Food and Drug Administration, though the FDA has determined that such clearance or approval is not necessary. These tests are used for clinical purposes and they should not be regarded as investigational or for research. This laboratory has been approved by CLIA 88, designated as a high-complexity laboratory and is qualified to perform these tests.      Interpretation performed at [facility], [address] [place] [address]      Gross Description 11/15/2024    Final                    Value:A. The specimen is received in formalin, labeled with the patient's name and hospital number, and is designated \" anus\".  The specimen consists of a tan, nodular and pedunculated portion of skin measuring 1.2 x 0.8 x 0.7 cm.  The apparent margin of resection is inked green.  The specimen is trisected perpendicular to the margin revealing tan cut surfaces.  The specimen is entirely submitted between sponges in 1 cassette.    Note: The estimated total formalin fixation time based upon " "information provided by the submitting clinician and the standard processing schedule is over 72 hours.  RRavstephaniei           Foreign Simeon MD        \"This note has been constructed using a voice recognition system.Therefore there may be syntax, spelling, and/or grammatical errors. Please call if you have any questions. \"  "

## 2025-01-23 ENCOUNTER — OFFICE VISIT (OUTPATIENT)
Dept: FAMILY MEDICINE CLINIC | Facility: CLINIC | Age: 75
End: 2025-01-23
Payer: MEDICARE

## 2025-01-23 VITALS
HEIGHT: 65 IN | OXYGEN SATURATION: 97 % | BODY MASS INDEX: 38.32 KG/M2 | HEART RATE: 82 BPM | SYSTOLIC BLOOD PRESSURE: 120 MMHG | DIASTOLIC BLOOD PRESSURE: 76 MMHG | WEIGHT: 230 LBS

## 2025-01-23 DIAGNOSIS — K21.9 GASTROESOPHAGEAL REFLUX DISEASE WITHOUT ESOPHAGITIS: ICD-10-CM

## 2025-01-23 DIAGNOSIS — B00.1 HERPES LABIALIS: ICD-10-CM

## 2025-01-23 DIAGNOSIS — K57.90 DIVERTICULOSIS: ICD-10-CM

## 2025-01-23 DIAGNOSIS — G40.909 SEIZURE DISORDER (HCC): ICD-10-CM

## 2025-01-23 DIAGNOSIS — G43.909 MIGRAINE WITHOUT STATUS MIGRAINOSUS, NOT INTRACTABLE, UNSPECIFIED MIGRAINE TYPE: ICD-10-CM

## 2025-01-23 DIAGNOSIS — M79.7 FIBROMYALGIA: ICD-10-CM

## 2025-01-23 DIAGNOSIS — G89.29 CHRONIC BILATERAL LOW BACK PAIN WITHOUT SCIATICA: ICD-10-CM

## 2025-01-23 DIAGNOSIS — K22.70 BARRETT'S ESOPHAGUS WITHOUT DYSPLASIA: ICD-10-CM

## 2025-01-23 DIAGNOSIS — F11.20 CONTINUOUS OPIOID DEPENDENCE (HCC): ICD-10-CM

## 2025-01-23 DIAGNOSIS — Z12.11 COLON CANCER SCREENING: ICD-10-CM

## 2025-01-23 DIAGNOSIS — G89.4 CHRONIC PAIN SYNDROME: Primary | ICD-10-CM

## 2025-01-23 DIAGNOSIS — E66.9 OBESITY (BMI 35.0-39.9 WITHOUT COMORBIDITY): ICD-10-CM

## 2025-01-23 DIAGNOSIS — T78.40XD ALLERGY, SUBSEQUENT ENCOUNTER: ICD-10-CM

## 2025-01-23 DIAGNOSIS — E03.9 ACQUIRED HYPOTHYROIDISM: ICD-10-CM

## 2025-01-23 DIAGNOSIS — M54.50 CHRONIC BILATERAL LOW BACK PAIN WITHOUT SCIATICA: ICD-10-CM

## 2025-01-23 DIAGNOSIS — F11.20 OPIOID DEPENDENCE, UNCOMPLICATED (HCC): ICD-10-CM

## 2025-01-23 PROCEDURE — 99214 OFFICE O/P EST MOD 30 MIN: CPT | Performed by: INTERNAL MEDICINE

## 2025-01-23 PROCEDURE — G2211 COMPLEX E/M VISIT ADD ON: HCPCS | Performed by: INTERNAL MEDICINE

## 2025-01-23 RX ORDER — ACYCLOVIR 50 MG/G
CREAM TOPICAL 2 TIMES DAILY
Qty: 5 G | Refills: 2 | Status: SHIPPED | OUTPATIENT
Start: 2025-01-23

## 2025-01-23 RX ORDER — HYDROCODONE BITARTRATE AND ACETAMINOPHEN 5; 325 MG/1; MG/1
1 TABLET ORAL 2 TIMES DAILY PRN
Qty: 60 TABLET | Refills: 0 | Status: SHIPPED | OUTPATIENT
Start: 2025-01-23

## 2025-01-23 NOTE — ASSESSMENT & PLAN NOTE
Currently taking hydrocodone twice a day without taking pain medication has been difficulty with day-to-day activities

## 2025-01-23 NOTE — ASSESSMENT & PLAN NOTE
Question side effects with the duloxetine with heart sensation feeling but it has been helping with her fibromyalgia 30 mg daily will try her on 30 mg every other day and see if it makes any difference.

## 2025-01-23 NOTE — ASSESSMENT & PLAN NOTE
Patient with hypothyroidism last TSH level done in the month of September came back at 2.157 normal we will continue with the same dose of levothyroxine 100 mcg daily

## 2025-01-23 NOTE — ASSESSMENT & PLAN NOTE
Patient continues to experience pain in the low back area supposed to be seen by the spine physician for injection but has to be postponed because of her son getting surgery meanwhile we will renew her pain medication

## 2025-01-28 ENCOUNTER — TELEPHONE (OUTPATIENT)
Age: 75
End: 2025-01-28

## 2025-01-28 NOTE — TELEPHONE ENCOUNTER
PA Acyclovir 5% cream DENIED    Reason:(Screenshot if applicable)        Message sent to office clinical pool Yes    Denial letter scanned into Media Yes    Appeal started No (Provider will need to decide if appeal is warranted and send clinical documentation to Prior Authorization Team for initiation.)    **Please follow up with your patient regarding denial and next steps**

## 2025-01-28 NOTE — TELEPHONE ENCOUNTER
PA Acyclovir 5% cream SUBMITTED     to [employer]    via    [x]CMM-KEY: [id number]  []Surescripts-Case ID #    []Availity-Auth ID #  NDC #    []Faxed to plan   []Other website    []Phone call Case ID #      []PA sent as URGENT    All office notes, labs and other pertaining documents and studies sent. Clinical questions answered. Awaiting determination from insurance company.     Turnaround time for your insurance to make a decision on your Prior Authorization can take 7-21 business days.

## 2025-01-31 ENCOUNTER — TELEPHONE (OUTPATIENT)
Dept: FAMILY MEDICINE CLINIC | Facility: CLINIC | Age: 75
End: 2025-01-31

## 2025-01-31 DIAGNOSIS — B00.1 HERPES LABIALIS: Primary | ICD-10-CM

## 2025-01-31 RX ORDER — VALACYCLOVIR HYDROCHLORIDE 1 G/1
TABLET, FILM COATED ORAL
Qty: 8 TABLET | Refills: 2 | Status: SHIPPED | OUTPATIENT
Start: 2025-01-31 | End: 2025-01-31

## 2025-01-31 NOTE — PROGRESS NOTES
Acyclovir cream is not covered by the insurance will give her valacyclovir 1 g tablet 2 tablets 2 times a day for 1 day at the onset of symptoms.

## 2025-02-17 DIAGNOSIS — F41.9 ANXIETY: ICD-10-CM

## 2025-02-17 RX ORDER — DULOXETIN HYDROCHLORIDE 30 MG/1
30 CAPSULE, DELAYED RELEASE ORAL EVERY MORNING
Qty: 90 CAPSULE | Refills: 1 | Status: SHIPPED | OUTPATIENT
Start: 2025-02-17

## 2025-02-18 DIAGNOSIS — K21.9 GASTROESOPHAGEAL REFLUX DISEASE WITHOUT ESOPHAGITIS: ICD-10-CM

## 2025-02-18 DIAGNOSIS — T78.40XD ALLERGY, SUBSEQUENT ENCOUNTER: ICD-10-CM

## 2025-02-18 DIAGNOSIS — E03.9 ACQUIRED HYPOTHYROIDISM: ICD-10-CM

## 2025-02-18 DIAGNOSIS — G40.909 SEIZURE DISORDER (HCC): ICD-10-CM

## 2025-02-19 RX ORDER — LEVOTHYROXINE SODIUM 100 UG/1
100 TABLET ORAL EVERY MORNING
Qty: 90 TABLET | Refills: 1 | Status: SHIPPED | OUTPATIENT
Start: 2025-02-19

## 2025-02-19 RX ORDER — PANTOPRAZOLE SODIUM 40 MG/1
40 TABLET, DELAYED RELEASE ORAL DAILY
Qty: 90 TABLET | Refills: 1 | Status: SHIPPED | OUTPATIENT
Start: 2025-02-19

## 2025-02-19 RX ORDER — LEVETIRACETAM 500 MG/1
500 TABLET ORAL 2 TIMES DAILY
Qty: 180 TABLET | Refills: 1 | Status: SHIPPED | OUTPATIENT
Start: 2025-02-19

## 2025-02-19 RX ORDER — MONTELUKAST SODIUM 10 MG/1
10 TABLET ORAL EVERY MORNING
Qty: 90 TABLET | Refills: 1 | Status: SHIPPED | OUTPATIENT
Start: 2025-02-19

## 2025-02-20 DIAGNOSIS — G89.4 CHRONIC PAIN SYNDROME: ICD-10-CM

## 2025-02-21 RX ORDER — HYDROCODONE BITARTRATE AND ACETAMINOPHEN 5; 325 MG/1; MG/1
1 TABLET ORAL 2 TIMES DAILY PRN
Qty: 60 TABLET | Refills: 0 | Status: SHIPPED | OUTPATIENT
Start: 2025-02-21

## 2025-02-24 NOTE — PROGRESS NOTES
Office Visit Note  25     Maryanne Marcos 74 y.o. female MRN: [id number]  : 1950    Assessment:     1. Chronic bilateral low back pain without sciatica  Assessment & Plan:  As mentioned in the history of present illness patient seen by the spine physician received Medrol Dosepak also had an MRI which I reviewed with the patient showed arthritic changes patient with lumbar radiculopathy scheduled to have a facet joint medial branch block.  Meanwhile continue with the pain medication which we have prescribed  2. Chronic pain syndrome  Assessment & Plan:  Continue Norco for pain management  3. Opioid dependence, uncomplicated (HCC)  Assessment & Plan:  Currently taking hydrocodone twice a day as needed for severe pain  4. Acquired hypothyroidism  Assessment & Plan:  Currently patient is taking levothyroxine 100 mcg daily last TSH level was 2.157 continue with the same dose  5. Fibromyalgia  Assessment & Plan:  Continue fibromyalgia 30 mg every other day  6. Allergy, subsequent encounter  Assessment & Plan:  On Atrovent nasal spray as well as Singulair we will continue  7. Carson's esophagus without dysplasia  Assessment & Plan:  Currently on pantoprazole  8. Gastroesophageal reflux disease without esophagitis  Assessment & Plan:  Continue pantoprazole  9. Herpes labialis  Assessment & Plan:  Zovirax not covered we have prescribed her Valtrex tablets for herpes labialis.  10. Migraine without status migrainosus, not intractable, unspecified migraine type  Assessment & Plan:  No episodes of migraine headaches recently  11. Obesity (BMI 35.0-39.9 without comorbidity)  Assessment & Plan:      Emphasized regarding diet exercise lifestyle modification cutting back carbohydrate intake calorie intake lose weight  12. Seizure disorder (HCC)  Assessment & Plan:  Continue Keppra  13. Diverticulosis             Discussion Summary and Plan:  Today's care plan and medications were reviewed with patient in detail and  all their questions answered to their satisfaction.    Chief Complaint   Patient presents with   • Follow-up      Subjective:  Patient is coming here for a follow-up evaluation with regards to symptoms of low back pain radiating down both lower extremities was seen by the orthopedic surgeon spine physician and was prescribed Medrol dose pack to which to some extent the pain is better she also had an MRI done which showed arthritic changes.    Patient with multiple other medical problems including migraine GERD herpes labialis seizure disorder chronic pain syndrome fibromyalgia obesity.    Medications reviewed labs reviewed.  MRI reports reviewed spine physician reports reviewed        The following portions of the patient's history were reviewed and updated as appropriate: allergies, current medications, past family history, past medical history, past social history, past surgical history and problem list.    Review of Systems   Constitutional:  Negative for chills and fever.   HENT:  Negative for ear pain and sore throat.    Eyes:  Negative for pain and visual disturbance.   Respiratory:  Negative for cough and shortness of breath.    Cardiovascular:  Negative for chest pain and palpitations.   Gastrointestinal:  Negative for abdominal pain and vomiting.   Genitourinary:  Negative for dysuria and hematuria.   Musculoskeletal:  Positive for arthralgias and back pain.   Skin:  Negative for color change and rash.   Neurological:  Negative for seizures and syncope.   All other systems reviewed and are negative.        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   • Nail deformity   • Chronic bilateral low back pain without sciatica   • Hemorrhoids   • Herpes labialis   • Skin tag of anus     Past Medical History:   Diagnosis Date   • Allergic rhinitis    • Anxiety with depression    • Arm fracture, left     x2   • Arthritis    • Callus    • Colon polyp    • Degenerative joint disease    • Disease of thyroid gland    • Encounter for immunization 10/18/2022   • Fibromyalgia    • GERD (gastroesophageal reflux disease)    • Hemorrhoids    • Hypertension    • Hypothyroidism    • Kidney stones    • Low back pain    • Neurological disorder     Migraine   • Rheumatoid arthritis (HCC)    • Seasonal allergies    • Seizure disorder (HCC)      Past Surgical History:   Procedure Laterality Date   • COLONOSCOPY     • DILATION AND CURETTAGE OF UTERUS     • NECK SURGERY      Plate   • ORIF FOREARM FRACTURE Left    • SHOULDER SURGERY      x2   • TOENAIL EXCISION      pinkie toes   • TONSILLECTOMY     • UPPER GASTROINTESTINAL ENDOSCOPY       Social History     Substance and Sexual Activity   Alcohol Use Not Currently    Comment: glass of wine with dinner out, 1x a week stopped      Social History     Substance and Sexual Activity   Drug Use Never     Social History     Tobacco Use   Smoking Status Former   • Current packs/day: 0.00   • Average packs/day: 1 pack/day for 35.0 years (35.0 ttl pk-yrs)   • Types: Cigarettes   • Quit date:    • Years since quittin.1   • Passive exposure: Past   Smokeless Tobacco Never     Family History   Problem Relation Age of Onset   • Stroke Mother    • Arthritis Mother    • Heart disease Father    • Colon cancer Maternal Grandmother      Health Maintenance Due   Topic   • Hepatitis C Screening    • Breast Cancer Screening: Mammogram    • Osteoporosis Screening    • Zoster Vaccine (1 of 2)   • Pneumococcal Vaccine: 65+ Years (1 of 1 - PCV)   • COVID-19  "Vaccine (3 - 2024-25 season)   • Fall Risk    • Medicare Annual Wellness Visit (AWV)       Meds/Allergies       Current Outpatient Medications:   •  albuterol (PROVENTIL HFA,VENTOLIN HFA) 90 mcg/act inhaler, Inhale 2 puffs every 6 (six) hours as needed for wheezing, Disp: 8 g, Rfl: 1  •  Cholecalciferol (Vitamin D3) 50 MCG (2000 UT) TABS, Take 2,000 Units by mouth in the morning, Disp: , Rfl:   •  DULoxetine (CYMBALTA) 30 mg delayed release capsule, TAKE 1 CAPSULE EVERY MORNING, Disp: 90 capsule, Rfl: 1  •  HYDROcodone-acetaminophen (NORCO) 5-325 mg per tablet, Take 1 tablet by mouth 2 (two) times a day as needed for pain Max Daily Amount: 2 tablets, Disp: 60 tablet, Rfl: 0  •  ipratropium (ATROVENT) 0.03 % nasal spray, 2 sprays into each nostril every 12 (twelve) hours, Disp: 90 mL, Rfl: 1  •  levETIRAcetam (KEPPRA) 500 mg tablet, TAKE 1 TABLET TWICE DAILY, Disp: 180 tablet, Rfl: 1  •  levothyroxine 100 mcg tablet, TAKE 1 TABLET EVERY MORNING, Disp: 90 tablet, Rfl: 1  •  Magnesium 400 MG TABS, Take 1 tablet (400 mg total) by mouth in the morning, Disp: 90 tablet, Rfl: 0  •  montelukast (SINGULAIR) 10 mg tablet, TAKE 1 TABLET EVERY MORNING, Disp: 90 tablet, Rfl: 1  •  pantoprazole (PROTONIX) 40 mg tablet, TAKE 1 TABLET EVERY DAY, Disp: 90 tablet, Rfl: 1  •  vitamin B-12 (VITAMIN B-12) 1,000 mcg tablet, Take 1 tablet (1,000 mcg total) by mouth daily, Disp: 90 tablet, Rfl: 1  •  valACYclovir (VALTREX) 1,000 mg tablet, Patient should take 2 tablets 2 times a day at the onset of symptoms for 1 day, Disp: 8 tablet, Rfl: 2      Objective:    Vitals:   /74 (BP Location: Right arm, Patient Position: Sitting, Cuff Size: Large)   Pulse 78   Ht 5' 5\" (1.651 m)   Wt 103 kg (226 lb 6.4 oz)   SpO2 93%   BMI 37.67 kg/m²   Body mass index is 37.67 kg/m².  Vitals:    02/25/25 1051   Weight: 103 kg (226 lb 6.4 oz)       Physical Exam  Vitals and nursing note reviewed.   Constitutional:       Appearance: Normal appearance. "   Cardiovascular:      Rate and Rhythm: Normal rate and regular rhythm.      Heart sounds: Normal heart sounds.   Pulmonary:      Effort: Pulmonary effort is normal.      Breath sounds: Normal breath sounds.   Abdominal:      General: Abdomen is flat.      Palpations: Abdomen is soft.   Musculoskeletal:      Cervical back: Normal range of motion and neck supple.      Right lower leg: No edema.      Left lower leg: No edema.      Comments: Movements of the lumbar spine causing increasing pain and discomfort   Skin:     General: Skin is warm and dry.      Capillary Refill: Capillary refill takes less than 2 seconds.   Neurological:      Mental Status: She is alert and oriented to person, place, and time.   Psychiatric:         Mood and Affect: Mood normal.         Behavior: Behavior normal.         Lab Review   No visits with results within 2 Month(s) from this visit.   Latest known visit with results is:   Consult on 11/14/2024   Component Date Value Ref Range Status   • Case Report 11/15/2024    Final                    Value:Surgical Pathology Report                         Case: [id number]                                  Authorizing Provider:  Maxim Marion MD   Collected:           11/15/2024 08              Ordering Location:     [facility] Received:            11/15/2024 0821                                     [facility]                                                               Pathologist:           Cesilia Keen DO                                                     Specimen:    Anus                                                                                      • Final Diagnosis 11/15/2024    Final                    Value:A. Anus, Excision:  - Fibroepithelial polyp.     • Additional Information 11/15/2024    Final                    Value:All reported additional testing was performed with appropriately reactive controls.  These tests were developed and their  "performance characteristics determined by [facility] or appropriate performing facility, though some tests may be performed on tissues which have not been validated for performance characteristics (such as staining performed on alcohol exposed cell blocks and decalcified tissues).  Results should be interpreted with caution and in the context of the patients’ clinical condition. These tests may not be cleared or approved by the U.S. Food and Drug Administration, though the FDA has determined that such clearance or approval is not necessary. These tests are used for clinical purposes and they should not be regarded as investigational or for research. This laboratory has been approved by CLIA 88, designated as a high-complexity laboratory and is qualified to perform these tests.      Interpretation performed at [facility], [facility] [address]     • Gross Description 11/15/2024    Final                    Value:A. The specimen is received in formalin, labeled with the patient's name and hospital number, and is designated \" anus\".  The specimen consists of a tan, nodular and pedunculated portion of skin measuring 1.2 x 0.8 x 0.7 cm.  The apparent margin of resection is inked green.  The specimen is trisected perpendicular to the margin revealing tan cut surfaces.  The specimen is entirely submitted between sponges in 1 cassette.    Note: The estimated total formalin fixation time based upon information provided by the submitting clinician and the standard processing schedule is over 72 hours.  Surendra Simeon MD        \"This note has been constructed using a voice recognition system.Therefore there may be syntax, spelling, and/or grammatical errors. Please call if you have any questions. \"  "

## 2025-02-25 ENCOUNTER — OFFICE VISIT (OUTPATIENT)
Dept: FAMILY MEDICINE CLINIC | Facility: CLINIC | Age: 75
End: 2025-02-25
Payer: MEDICARE

## 2025-02-25 VITALS
DIASTOLIC BLOOD PRESSURE: 74 MMHG | WEIGHT: 226.4 LBS | OXYGEN SATURATION: 93 % | HEART RATE: 78 BPM | HEIGHT: 65 IN | SYSTOLIC BLOOD PRESSURE: 121 MMHG | BODY MASS INDEX: 37.72 KG/M2

## 2025-02-25 DIAGNOSIS — G89.29 CHRONIC BILATERAL LOW BACK PAIN WITHOUT SCIATICA: Primary | ICD-10-CM

## 2025-02-25 DIAGNOSIS — E03.9 ACQUIRED HYPOTHYROIDISM: ICD-10-CM

## 2025-02-25 DIAGNOSIS — T78.40XD ALLERGY, SUBSEQUENT ENCOUNTER: ICD-10-CM

## 2025-02-25 DIAGNOSIS — B00.1 HERPES LABIALIS: ICD-10-CM

## 2025-02-25 DIAGNOSIS — K21.9 GASTROESOPHAGEAL REFLUX DISEASE WITHOUT ESOPHAGITIS: ICD-10-CM

## 2025-02-25 DIAGNOSIS — K22.70 BARRETT'S ESOPHAGUS WITHOUT DYSPLASIA: ICD-10-CM

## 2025-02-25 DIAGNOSIS — G89.4 CHRONIC PAIN SYNDROME: ICD-10-CM

## 2025-02-25 DIAGNOSIS — M79.7 FIBROMYALGIA: ICD-10-CM

## 2025-02-25 DIAGNOSIS — G40.909 SEIZURE DISORDER (HCC): ICD-10-CM

## 2025-02-25 DIAGNOSIS — F11.20 OPIOID DEPENDENCE, UNCOMPLICATED (HCC): ICD-10-CM

## 2025-02-25 DIAGNOSIS — M54.50 CHRONIC BILATERAL LOW BACK PAIN WITHOUT SCIATICA: Primary | ICD-10-CM

## 2025-02-25 DIAGNOSIS — E66.9 OBESITY (BMI 35.0-39.9 WITHOUT COMORBIDITY): ICD-10-CM

## 2025-02-25 DIAGNOSIS — K57.90 DIVERTICULOSIS: ICD-10-CM

## 2025-02-25 DIAGNOSIS — G43.909 MIGRAINE WITHOUT STATUS MIGRAINOSUS, NOT INTRACTABLE, UNSPECIFIED MIGRAINE TYPE: ICD-10-CM

## 2025-02-25 PROCEDURE — G2211 COMPLEX E/M VISIT ADD ON: HCPCS | Performed by: INTERNAL MEDICINE

## 2025-02-25 PROCEDURE — 99214 OFFICE O/P EST MOD 30 MIN: CPT | Performed by: INTERNAL MEDICINE

## 2025-02-25 NOTE — ASSESSMENT & PLAN NOTE
Emphasized regarding diet exercise lifestyle modification cutting back carbohydrate intake calorie intake lose weight  
As mentioned in the history of present illness patient seen by the spine physician received Medrol Dosepak also had an MRI which I reviewed with the patient showed arthritic changes patient with lumbar radiculopathy scheduled to have a facet joint medial branch block.  Meanwhile continue with the pain medication which we have prescribed  
Continue Keppra  
Continue Norco for pain management  
Continue fibromyalgia 30 mg every other day  
Continue pantoprazole  
Currently on pantoprazole  
Currently patient is taking levothyroxine 100 mcg daily last TSH level was 2.157 continue with the same dose  
Currently taking hydrocodone twice a day as needed for severe pain  
No episodes of migraine headaches recently  
On Atrovent nasal spray as well as Singulair we will continue  
Zovirax not covered we have prescribed her Valtrex tablets for herpes labialis.  
You can access the [facility] Patient Portal, offered by [facility], by registering with the following website: [email or web address]

## 2025-03-20 DIAGNOSIS — G89.4 CHRONIC PAIN SYNDROME: ICD-10-CM

## 2025-03-21 RX ORDER — HYDROCODONE BITARTRATE AND ACETAMINOPHEN 5; 325 MG/1; MG/1
1 TABLET ORAL 2 TIMES DAILY PRN
Qty: 60 TABLET | Refills: 0 | Status: SHIPPED | OUTPATIENT
Start: 2025-03-21

## 2025-03-25 ENCOUNTER — OFFICE VISIT (OUTPATIENT)
Dept: FAMILY MEDICINE CLINIC | Facility: CLINIC | Age: 75
End: 2025-03-25
Payer: MEDICARE

## 2025-03-25 VITALS
DIASTOLIC BLOOD PRESSURE: 74 MMHG | HEIGHT: 65 IN | HEART RATE: 74 BPM | SYSTOLIC BLOOD PRESSURE: 120 MMHG | WEIGHT: 226.6 LBS | BODY MASS INDEX: 37.75 KG/M2 | OXYGEN SATURATION: 95 %

## 2025-03-25 DIAGNOSIS — F11.20 OPIOID DEPENDENCE, UNCOMPLICATED (HCC): ICD-10-CM

## 2025-03-25 DIAGNOSIS — K22.70 BARRETT'S ESOPHAGUS WITHOUT DYSPLASIA: ICD-10-CM

## 2025-03-25 DIAGNOSIS — G40.909 SEIZURE DISORDER (HCC): ICD-10-CM

## 2025-03-25 DIAGNOSIS — G43.909 MIGRAINE WITHOUT STATUS MIGRAINOSUS, NOT INTRACTABLE, UNSPECIFIED MIGRAINE TYPE: ICD-10-CM

## 2025-03-25 DIAGNOSIS — G89.29 CHRONIC BILATERAL LOW BACK PAIN WITHOUT SCIATICA: ICD-10-CM

## 2025-03-25 DIAGNOSIS — B00.1 HERPES LABIALIS: ICD-10-CM

## 2025-03-25 DIAGNOSIS — M54.50 CHRONIC BILATERAL LOW BACK PAIN WITHOUT SCIATICA: ICD-10-CM

## 2025-03-25 DIAGNOSIS — K21.9 GASTROESOPHAGEAL REFLUX DISEASE WITHOUT ESOPHAGITIS: ICD-10-CM

## 2025-03-25 DIAGNOSIS — E03.9 ACQUIRED HYPOTHYROIDISM: ICD-10-CM

## 2025-03-25 DIAGNOSIS — M79.7 FIBROMYALGIA: ICD-10-CM

## 2025-03-25 DIAGNOSIS — G89.4 CHRONIC PAIN SYNDROME: Primary | ICD-10-CM

## 2025-03-25 DIAGNOSIS — T78.40XD ALLERGY, SUBSEQUENT ENCOUNTER: ICD-10-CM

## 2025-03-25 DIAGNOSIS — E66.9 OBESITY (BMI 35.0-39.9 WITHOUT COMORBIDITY): ICD-10-CM

## 2025-03-25 PROCEDURE — 99214 OFFICE O/P EST MOD 30 MIN: CPT | Performed by: INTERNAL MEDICINE

## 2025-03-25 PROCEDURE — G2211 COMPLEX E/M VISIT ADD ON: HCPCS | Performed by: INTERNAL MEDICINE

## 2025-03-25 NOTE — ASSESSMENT & PLAN NOTE
Patient with chronic pain syndrome low back area continue with Norco for pain management for now also getting epidural injections

## 2025-03-25 NOTE — PROGRESS NOTES
Office Visit Note  25     Maryanne Marcos 74 y.o. female MRN: [id number]  : 1950    Assessment:     1. Chronic pain syndrome  Assessment & Plan:  Patient with chronic pain syndrome low back area continue with Norco for pain management for now also getting epidural injections  2. Acquired hypothyroidism  Assessment & Plan:  Currently patient is taking levothyroxine 100 mcg daily continue with the same last TSH level was 2.157  3. Allergy, subsequent encounter  Assessment & Plan:  Patient is on Atrovent nasal spray along with the Singulair continue  4. Carson's esophagus without dysplasia  Assessment & Plan:  Patient with Carson's esophagus currently she is being followed by the gastroenterology and also patient takes Protonix 40 mg daily continue the same recommend patient not to eat late in the night and keep the head of the bed up  5. Opioid dependence, uncomplicated (HCC)  Assessment & Plan:  Currently patient is on hydrocodone twice a day as needed without taking pain medication she is having difficulty with her day-to-day activities as mentioned above patient is also going to be seen and have an epidural injection this coming Thursday for the back  6. Chronic bilateral low back pain without sciatica  Assessment & Plan:  Patient continues to experience pain in the low back area seen with the pain and spine management physician going for an epidural injection on this Thursday movements of the lumbar spine causing discomfort pain currently she is taking Norco for pain management  7. Fibromyalgia  Assessment & Plan:  Currently patient is taking Cymbalta 30 mg every other day  8. Gastroesophageal reflux disease without esophagitis  Assessment & Plan:  Patient is on pantoprazole continue    9. Herpes labialis  Assessment & Plan:  Patient takes Valtrex tablets for herpes labialis as needed  10. Obesity (BMI 35.0-39.9 without comorbidity)  Assessment & Plan:      Emphasized regarding diet exercise  lifestyle modification cutting back on calorie intake carbohydrate intake  11. Seizure disorder (HCC)  Assessment & Plan:  Patient is on Keppra will continue  12. Migraine without status migrainosus, not intractable, unspecified migraine type  Assessment & Plan:  No episodes of migraine in the recent past stable             Discussion Summary and Plan:  Today's care plan and medications were reviewed with patient in detail and all their questions answered to their satisfaction.    Chief Complaint   Patient presents with   • Follow-up      Subjective:  Patient is coming here for a follow-up evaluation with regards to her symptoms of low back pain for which she is being followed by the pain management physician and is scheduled for an epidural injection this coming Thursday.  Pain is low back is aggravated by prolonged standing and walking currently she is taking Norco to get some relief with the pain.  I reviewed the notes from the spine management physician.    Patient with multiple other medical problems including migraine headaches history of hemorrhoids GERD Carson's esophagus diverticulosis herpes labialis hypothyroidism seizure disorder chronic pain syndrome fibromyalgia.    Medication reviewed labs reviewed patient will get annual lab work done in June.        The following portions of the patient's history were reviewed and updated as appropriate: allergies, current medications, past family history, past medical history, past social history, past surgical history and problem list.    Review of Systems   Constitutional:  Negative for chills and fever.   HENT:  Negative for ear pain and sore throat.    Eyes:  Negative for pain and visual disturbance.   Respiratory:  Negative for cough and shortness of breath.    Cardiovascular:  Negative for chest pain and palpitations.   Gastrointestinal:  Negative for abdominal pain and vomiting.   Genitourinary:  Negative for dysuria and hematuria.   Musculoskeletal:   Positive for arthralgias and back pain.   Skin:  Negative for color change and rash.   Neurological:  Negative for seizures and syncope.   All other systems reviewed and are negative.        Historical Information   Patient Active Problem List   Diagnosis   • Chronic pain syndrome   • Acquired hypothyroidism   • Seizure disorder (HCC)   • Gastroesophageal reflux disease without esophagitis   • Allergies   • Fibromyalgia   • Migraines   • Opioid dependence, uncomplicated (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   • Nail deformity   • Chronic bilateral low back pain without sciatica   • Hemorrhoids   • Herpes labialis   • Skin tag of anus     Past Medical History:   Diagnosis Date   • Allergic rhinitis 1980   • Anxiety with depression    • Arm fracture, left     x2   • Arthritis    • Callus    • Colon polyp    • Degenerative joint disease    • Disease of thyroid gland 1989   • Encounter for immunization 10/18/2022   • Fibromyalgia    • GERD (gastroesophageal reflux disease)    • Hemorrhoids    • Hypertension    • Hypothyroidism    • Kidney stones    • Low back pain    • Neurological disorder 1998    Migraine   • Rheumatoid arthritis (HCC) 1999   • Seasonal allergies    • Seizure disorder ([facility])      Past Surgical History:   Procedure Laterality Date   • COLONOSCOPY     • DILATION AND CURETTAGE OF UTERUS     • NECK SURGERY      Plate   • ORIF FOREARM FRACTURE Left    • SHOULDER SURGERY      x2   • TOENAIL EXCISION  1990    pinkie toes   • TONSILLECTOMY     • UPPER GASTROINTESTINAL ENDOSCOPY       Social History     Substance and Sexual Activity   Alcohol Use Not Currently    Comment: glass of wine with dinner out, 1x a week stopped 2009     Social History     Substance and Sexual Activity   Drug Use Never     Social History     Tobacco Use   Smoking Status Former   •  Current packs/day: 0.00   • Average packs/day: 1 pack/day for 35.0 years (35.0 ttl pk-yrs)   • Types: Cigarettes   • Quit date:    • Years since quittin.2   • Passive exposure: Past   Smokeless Tobacco Never     Family History   Problem Relation Age of Onset   • Stroke Mother    • Arthritis Mother    • Heart disease Father    • Colon cancer Maternal Grandmother      Health Maintenance Due   Topic   • Hepatitis C Screening    • Breast Cancer Screening: Mammogram    • Osteoporosis Screening    • Zoster Vaccine (1 of 2)   • Pneumococcal Vaccine: 65+ Years (1 of 1 - PCV)   • COVID-19 Vaccine (3 - 2024-25 season)   • Fall Risk    • Medicare Annual Wellness Visit (AWV)       Meds/Allergies       Current Outpatient Medications:   •  albuterol (PROVENTIL HFA,VENTOLIN HFA) 90 mcg/act inhaler, Inhale 2 puffs every 6 (six) hours as needed for wheezing, Disp: 8 g, Rfl: 1  •  Cholecalciferol (Vitamin D3) 50 MCG (2000 UT) TABS, Take 2,000 Units by mouth in the morning, Disp: , Rfl:   •  DULoxetine (CYMBALTA) 30 mg delayed release capsule, TAKE 1 CAPSULE EVERY MORNING, Disp: 90 capsule, Rfl: 1  •  HYDROcodone-acetaminophen (NORCO) 5-325 mg per tablet, Take 1 tablet by mouth 2 (two) times a day as needed for pain Max Daily Amount: 2 tablets, Disp: 60 tablet, Rfl: 0  •  ipratropium (ATROVENT) 0.03 % nasal spray, 2 sprays into each nostril every 12 (twelve) hours, Disp: 90 mL, Rfl: 1  •  levETIRAcetam (KEPPRA) 500 mg tablet, TAKE 1 TABLET TWICE DAILY, Disp: 180 tablet, Rfl: 1  •  levothyroxine 100 mcg tablet, TAKE 1 TABLET EVERY MORNING, Disp: 90 tablet, Rfl: 1  •  Magnesium 400 MG TABS, Take 1 tablet (400 mg total) by mouth in the morning, Disp: 90 tablet, Rfl: 0  •  montelukast (SINGULAIR) 10 mg tablet, TAKE 1 TABLET EVERY MORNING, Disp: 90 tablet, Rfl: 1  •  pantoprazole (PROTONIX) 40 mg tablet, TAKE 1 TABLET EVERY DAY, Disp: 90 tablet, Rfl: 1  •  vitamin B-12 (VITAMIN B-12) 1,000 mcg tablet, Take 1 tablet (1,000 mcg  "total) by mouth daily, Disp: 90 tablet, Rfl: 1  •  valACYclovir (VALTREX) 1,000 mg tablet, Patient should take 2 tablets 2 times a day at the onset of symptoms for 1 day, Disp: 8 tablet, Rfl: 2      Objective:    Vitals:   /74 (BP Location: Left arm, Patient Position: Sitting, Cuff Size: Large)   Pulse 74   Ht 5' 5\" (1.651 m)   Wt 103 kg (226 lb 9.6 oz)   SpO2 95%   BMI 37.71 kg/m²   Body mass index is 37.71 kg/m².  Vitals:    03/25/25 1051   Weight: 103 kg (226 lb 9.6 oz)       Physical Exam  Vitals and nursing note reviewed.   Constitutional:       Appearance: Normal appearance.   Cardiovascular:      Rate and Rhythm: Normal rate and regular rhythm.      Heart sounds: Normal heart sounds.   Pulmonary:      Effort: Pulmonary effort is normal.      Breath sounds: Normal breath sounds.   Abdominal:      General: Abdomen is flat.      Palpations: Abdomen is soft.   Musculoskeletal:      Cervical back: Normal range of motion and neck supple.      Right lower leg: No edema.      Left lower leg: No edema.      Comments: Lumbar spine movements causing discomfort and pain   Skin:     General: Skin is warm and dry.      Capillary Refill: Capillary refill takes less than 2 seconds.   Neurological:      Mental Status: She is alert and oriented to person, place, and time.   Psychiatric:         Mood and Affect: Mood normal.         Lab Review   No visits with results within 2 Month(s) from this visit.   Latest known visit with results is:   Consult on 11/14/2024   Component Date Value Ref Range Status   • Case Report 11/15/2024    Final                    Value:Surgical Pathology Report                         Case: [id number]                                  Authorizing Provider:  Maxim Marion MD   Collected:           11/15/2024 0821              Ordering Location:     [facility] Received:            11/15/2024 0821                                     [facility]" "                                      Pathologist:           Cesilia Keen DO                                                     Specimen:    Anus                                                                                      • Final Diagnosis 11/15/2024    Final                    Value:A. Anus, Excision:  - Fibroepithelial polyp.     • Additional Information 11/15/2024    Final                    Value:All reported additional testing was performed with appropriately reactive controls.  These tests were developed and their performance characteristics determined by [facility] or appropriate performing facility, though some tests may be performed on tissues which have not been validated for performance characteristics (such as staining performed on alcohol exposed cell blocks and decalcified tissues).  Results should be interpreted with caution and in the context of the patients’ clinical condition. These tests may not be cleared or approved by the U.S. Food and Drug Administration, though the FDA has determined that such clearance or approval is not necessary. These tests are used for clinical purposes and they should not be regarded as investigational or for research. This laboratory has been approved by CLIA 88, designated as a high-complexity laboratory and is qualified to perform these tests.      Interpretation performed at [facility], [address] [place] [address]     • Gross Description 11/15/2024    Final                    Value:A. The specimen is received in formalin, labeled with the patient's name and hospital number, and is designated \" anus\".  The specimen consists of a tan, nodular and pedunculated portion of skin measuring 1.2 x 0.8 x 0.7 cm.  The apparent margin of resection is inked green.  The specimen is trisected perpendicular to the margin revealing tan cut surfaces.  The specimen is entirely submitted between sponges in 1 " "cassette.    Note: The estimated total formalin fixation time based upon information provided by the submitting clinician and the standard processing schedule is over 72 hours.  Surendra Simeon MD        \"This note has been constructed using a voice recognition system.Therefore there may be syntax, spelling, and/or grammatical errors. Please call if you have any questions. \"  "

## 2025-03-25 NOTE — ASSESSMENT & PLAN NOTE
Patient continues to experience pain in the low back area seen with the pain and spine management physician going for an epidural injection on this Thursday movements of the lumbar spine causing discomfort pain currently she is taking Norco for pain management

## 2025-03-25 NOTE — ASSESSMENT & PLAN NOTE
Emphasized regarding diet exercise lifestyle modification cutting back on calorie intake carbohydrate intake

## 2025-03-25 NOTE — ASSESSMENT & PLAN NOTE
Currently patient is on hydrocodone twice a day as needed without taking pain medication she is having difficulty with her day-to-day activities as mentioned above patient is also going to be seen and have an epidural injection this coming Thursday for the back

## 2025-03-25 NOTE — ASSESSMENT & PLAN NOTE
Currently patient is taking levothyroxine 100 mcg daily continue with the same last TSH level was 2.157

## 2025-03-25 NOTE — ASSESSMENT & PLAN NOTE
Patient with Carson's esophagus currently she is being followed by the gastroenterology and also patient takes Protonix 40 mg daily continue the same recommend patient not to eat late in the night and keep the head of the bed up

## 2025-04-14 NOTE — PROGRESS NOTES
Name: Maryanne Marcos      : 1950      MRN: [id number]  Encounter Provider: Foreign Simeon MD  Encounter Date: 4/15/2025   Encounter department: [facility]  :  Assessment & Plan  Chronic bilateral low back pain without sciatica  As mentioned in history of present illness she received a steroid injection in the back movements are better but still has pain discomfort with bending forward still continuing with [place] for pain management.       Chronic pain syndrome  Patient with chronic pain syndrome on [place] still has the pain in spite of taking the epidural injection but better  Orders:  •  HYDROcodone-acetaminophen (NORCO) 5-325 mg per tablet; Take 1 tablet by mouth 2 (two) times a day as needed for pain Max Daily Amount: 2 tablets    Opioid dependence, uncomplicated (HCC)  Patient hydrocodone twice daily as needed continue with the same       Acquired hypothyroidism  Continue with levothyroxine 100 mcg daily last TSH done was 2.15 will get a repeat labs done in the month of        Allergy, subsequent encounter  Patient is on Singulair 1 daily along with Atrovent nasal spray              History of Present Illness   Patient is coming here for a follow-up evaluation with regards to the symptoms of pain in the low back area for which she was seen by the pain management physician spine physician and had received an epidural injection L5-1.  Patient responded well pain is better compared to before able to take better steps while walking rather than dragging her leg.  However when she bends forward she still has the pain discomfort symptoms.  She has an appointment with the pain management physician tomorrow.  She still needs the pain medication.    Medications reviewed labs reviewed.      Review of Systems   Constitutional:  Negative for chills and fever.   HENT:  Negative for ear pain and sore throat.    Eyes:  Negative for pain and visual disturbance.   Respiratory:  Negative for  "cough and shortness of breath.    Cardiovascular:  Negative for chest pain and palpitations.   Gastrointestinal:  Negative for abdominal pain and vomiting.   Genitourinary:  Negative for dysuria and hematuria.   Musculoskeletal:  Positive for arthralgias and back pain.   Skin:  Negative for color change and rash.   Neurological:  Negative for seizures and syncope.   All other systems reviewed and are negative.      Objective   /74 (BP Location: Right arm, Patient Position: Sitting, Cuff Size: Large)   Pulse 86   Ht 5' 5\" (1.651 m)   Wt 103 kg (226 lb 9.6 oz)   SpO2 99%   BMI 37.71 kg/m²      Physical Exam  Vitals and nursing note reviewed.   Constitutional:       General: She is not in acute distress.     Appearance: She is well-developed.   HENT:      Head: Normocephalic and atraumatic.   Eyes:      Conjunctiva/sclera: Conjunctivae normal.   Cardiovascular:      Rate and Rhythm: Normal rate and regular rhythm.      Heart sounds: No murmur heard.  Pulmonary:      Effort: Pulmonary effort is normal. No respiratory distress.      Breath sounds: Normal breath sounds.   Abdominal:      Palpations: Abdomen is soft.      Tenderness: There is no abdominal tenderness.   Musculoskeletal:         General: No swelling.      Cervical back: Neck supple.      Comments: Lumbar spine movements causing discomfort and pain   Skin:     General: Skin is warm and dry.      Capillary Refill: Capillary refill takes less than 2 seconds.   Neurological:      Mental Status: She is alert.   Psychiatric:         Mood and Affect: Mood normal.     No results found for this or any previous visit (from the past 8 weeks).     "

## 2025-04-15 ENCOUNTER — OFFICE VISIT (OUTPATIENT)
Dept: FAMILY MEDICINE CLINIC | Facility: CLINIC | Age: 75
End: 2025-04-15
Payer: MEDICARE

## 2025-04-15 VITALS
SYSTOLIC BLOOD PRESSURE: 124 MMHG | OXYGEN SATURATION: 99 % | HEART RATE: 86 BPM | DIASTOLIC BLOOD PRESSURE: 74 MMHG | HEIGHT: 65 IN | BODY MASS INDEX: 37.75 KG/M2 | WEIGHT: 226.6 LBS

## 2025-04-15 DIAGNOSIS — E03.9 ACQUIRED HYPOTHYROIDISM: ICD-10-CM

## 2025-04-15 DIAGNOSIS — M54.50 CHRONIC BILATERAL LOW BACK PAIN WITHOUT SCIATICA: Primary | ICD-10-CM

## 2025-04-15 DIAGNOSIS — F11.20 OPIOID DEPENDENCE, UNCOMPLICATED (HCC): ICD-10-CM

## 2025-04-15 DIAGNOSIS — G89.4 CHRONIC PAIN SYNDROME: ICD-10-CM

## 2025-04-15 DIAGNOSIS — T78.40XD ALLERGY, SUBSEQUENT ENCOUNTER: ICD-10-CM

## 2025-04-15 DIAGNOSIS — G89.29 CHRONIC BILATERAL LOW BACK PAIN WITHOUT SCIATICA: Primary | ICD-10-CM

## 2025-04-15 PROCEDURE — 99213 OFFICE O/P EST LOW 20 MIN: CPT | Performed by: INTERNAL MEDICINE

## 2025-04-15 PROCEDURE — G2211 COMPLEX E/M VISIT ADD ON: HCPCS | Performed by: INTERNAL MEDICINE

## 2025-04-15 RX ORDER — HYDROCODONE BITARTRATE AND ACETAMINOPHEN 5; 325 MG/1; MG/1
1 TABLET ORAL 2 TIMES DAILY PRN
Qty: 60 TABLET | Refills: 0 | Status: SHIPPED | OUTPATIENT
Start: 2025-04-15

## 2025-04-15 NOTE — ASSESSMENT & PLAN NOTE
As mentioned in history of present illness she received a steroid injection in the back movements are better but still has pain discomfort with bending forward still continuing with [place] for pain management.

## 2025-04-15 NOTE — ASSESSMENT & PLAN NOTE
Continue with levothyroxine 100 mcg daily last TSH done was 2.15 will get a repeat labs done in the month of June

## 2025-04-15 NOTE — ASSESSMENT & PLAN NOTE
Patient with chronic pain syndrome on [place] still has the pain in spite of taking the epidural injection but better  Orders:  •  HYDROcodone-acetaminophen (NORCO) 5-325 mg per tablet; Take 1 tablet by mouth 2 (two) times a day as needed for pain Max Daily Amount: 2 tablets

## 2025-05-20 DIAGNOSIS — G89.4 CHRONIC PAIN SYNDROME: ICD-10-CM

## 2025-05-20 RX ORDER — HYDROCODONE BITARTRATE AND ACETAMINOPHEN 5; 325 MG/1; MG/1
1 TABLET ORAL 2 TIMES DAILY PRN
Qty: 60 TABLET | Refills: 0 | Status: SHIPPED | OUTPATIENT
Start: 2025-05-20

## 2025-05-20 NOTE — TELEPHONE ENCOUNTER
Reason for call:   [x] Refill   [] Prior Auth  [] Other:     Office:   [x] PCP/Provider - PRIMARY CARE ENDER   [] Specialty/Provider -     Medication: HYDROcodone-acetaminophen (NORCO) 5-325 mg per tablet     Dose/Frequency: 1 tablet, 2 times daily PRN     Quantity: 60    Pharmacy: [facility] #[id number]    Local Pharmacy   Does the patient have enough for 3 days?   [x] Yes   [] No - Send as HP to POD    Mail Away Pharmacy   Does the patient have enough for 10 days?   [] Yes   [] No - Send as HP to POD

## 2025-05-21 DIAGNOSIS — G89.4 CHRONIC PAIN SYNDROME: ICD-10-CM

## 2025-05-21 NOTE — PROGRESS NOTES
Name: Maryanne Marcos      : 1950      MRN: [id number]  Encounter Provider: Foreign Simeon MD  Encounter Date: 2025   Encounter department: [facility]  :  Assessment & Plan  Chronic bilateral low back pain without sciatica  Patient is being followed by the pain management physician had received injections of steroid in the past currently taking Norco for pain management without taking medication patient is having hard time moving around.  Will continue with the same       Chronic pain syndrome  Patient with chronic pain syndrome on [place].  Still has the pain in spite of taking the epidural injection.  Will continue with the Norco for now.       Acquired hypothyroidism  Patient is on levothyroxine 100 mcg daily continue the same with repeat labs which has been ordered  Orders:  •  TSH, 3rd generation with Free T4 reflex; Future    Carson's esophagus without dysplasia  Currently patient is taking Protonix 40 mg daily we will continue the same recommend patient not to eat late in the night and keep the head end of the bed up       Fibromyalgia  Continue Cymbalta       Allergy, subsequent encounter  Continue with Singulair and Atrovent nasal spray       Gastroesophageal reflux disease without esophagitis  On pantoprazole continue       Migraine without status migrainosus, not intractable, unspecified migraine type  No episodes of migraines in the recent past       Obesity (BMI 35.0-39.9 without comorbidity)      Discussed with patient regarding diet exercise lifestyle modification cutting back on calorie and carbohydrate intake       Opioid dependence, uncomplicated (HCC)  Continue with hydrocodone as needed without taking pain medication patient is having difficulty with day-to-day activities       Seizure disorder (HCC)  Patient on Keppra continue       Screening for deficiency anemia    Orders:  •  CBC and differential; Future    Dyslipidemia    Orders:  •  Lipid panel;  Future    Pre-diabetes    Orders:  •  Comprehensive metabolic panel; Future  •  Hemoglobin A1C; Future  •  UA w Reflex to Microscopic w Reflex to Culture; Future           History of Present Illness   Patient is coming for a follow-up evaluation with his symptoms of low back pain chronic, history of migraine headaches history of hemorrhoids history of GERD Carson's esophagus without dysplasia, diverticulosis, seizure disorder, obesity.  Patient is being followed by the pain management physician and received injections for the lumbar spine in the past.    Medications reviewed labs reviewed patient is due for new set of labs will order the same.      Review of Systems   Constitutional:  Negative for chills and fever.   HENT:  Negative for ear pain and sore throat.    Eyes:  Negative for pain and visual disturbance.   Respiratory:  Negative for cough and shortness of breath.    Cardiovascular:  Negative for chest pain and palpitations.   Gastrointestinal:  Negative for abdominal pain and vomiting.   Genitourinary:  Negative for dysuria and hematuria.   Musculoskeletal:  Positive for arthralgias and back pain.   Skin:  Negative for color change and rash.   Neurological:  Negative for seizures and syncope.   All other systems reviewed and are negative.      Objective   /74 (BP Location: Right arm, Patient Position: Sitting, Cuff Size: Large)   Pulse 77   Wt 103 kg (227 lb 9.6 oz)   SpO2 96%   BMI 37.87 kg/m²      Physical Exam  Vitals and nursing note reviewed.   Constitutional:       General: She is not in acute distress.     Appearance: She is well-developed.   HENT:      Head: Normocephalic and atraumatic.     Eyes:      Conjunctiva/sclera: Conjunctivae normal.       Cardiovascular:      Rate and Rhythm: Normal rate and regular rhythm.      Heart sounds: No murmur heard.  Pulmonary:      Effort: Pulmonary effort is normal. No respiratory distress.      Breath sounds: Normal breath sounds.   Abdominal:       Palpations: Abdomen is soft.      Tenderness: There is no abdominal tenderness.     Musculoskeletal:         General: No swelling.      Cervical back: Neck supple.      Comments: Movements of the lumbar spine causing discomfort and pain.     Skin:     General: Skin is warm and dry.      Capillary Refill: Capillary refill takes less than 2 seconds.     Neurological:      Mental Status: She is alert and oriented to person, place, and time.     Psychiatric:         Mood and Affect: Mood normal.     No results found for this or any previous visit (from the past 8 weeks).

## 2025-05-22 ENCOUNTER — OFFICE VISIT (OUTPATIENT)
Dept: FAMILY MEDICINE CLINIC | Facility: CLINIC | Age: 75
End: 2025-05-22
Payer: MEDICARE

## 2025-05-22 VITALS
HEART RATE: 77 BPM | SYSTOLIC BLOOD PRESSURE: 122 MMHG | BODY MASS INDEX: 37.87 KG/M2 | DIASTOLIC BLOOD PRESSURE: 74 MMHG | OXYGEN SATURATION: 96 % | WEIGHT: 227.6 LBS

## 2025-05-22 DIAGNOSIS — G89.29 CHRONIC BILATERAL LOW BACK PAIN WITHOUT SCIATICA: Primary | ICD-10-CM

## 2025-05-22 DIAGNOSIS — M54.50 CHRONIC BILATERAL LOW BACK PAIN WITHOUT SCIATICA: Primary | ICD-10-CM

## 2025-05-22 DIAGNOSIS — R73.03 PRE-DIABETES: ICD-10-CM

## 2025-05-22 DIAGNOSIS — E66.9 OBESITY (BMI 35.0-39.9 WITHOUT COMORBIDITY): ICD-10-CM

## 2025-05-22 DIAGNOSIS — K22.70 BARRETT'S ESOPHAGUS WITHOUT DYSPLASIA: ICD-10-CM

## 2025-05-22 DIAGNOSIS — E78.5 DYSLIPIDEMIA: ICD-10-CM

## 2025-05-22 DIAGNOSIS — G43.909 MIGRAINE WITHOUT STATUS MIGRAINOSUS, NOT INTRACTABLE, UNSPECIFIED MIGRAINE TYPE: ICD-10-CM

## 2025-05-22 DIAGNOSIS — T78.40XD ALLERGY, SUBSEQUENT ENCOUNTER: ICD-10-CM

## 2025-05-22 DIAGNOSIS — G89.4 CHRONIC PAIN SYNDROME: ICD-10-CM

## 2025-05-22 DIAGNOSIS — M79.7 FIBROMYALGIA: ICD-10-CM

## 2025-05-22 DIAGNOSIS — F11.20 OPIOID DEPENDENCE, UNCOMPLICATED (HCC): ICD-10-CM

## 2025-05-22 DIAGNOSIS — K21.9 GASTROESOPHAGEAL REFLUX DISEASE WITHOUT ESOPHAGITIS: ICD-10-CM

## 2025-05-22 DIAGNOSIS — E03.9 ACQUIRED HYPOTHYROIDISM: ICD-10-CM

## 2025-05-22 DIAGNOSIS — Z13.0 SCREENING FOR DEFICIENCY ANEMIA: ICD-10-CM

## 2025-05-22 DIAGNOSIS — G40.909 SEIZURE DISORDER (HCC): ICD-10-CM

## 2025-05-22 PROCEDURE — 99214 OFFICE O/P EST MOD 30 MIN: CPT | Performed by: INTERNAL MEDICINE

## 2025-05-22 PROCEDURE — G2211 COMPLEX E/M VISIT ADD ON: HCPCS | Performed by: INTERNAL MEDICINE

## 2025-05-22 RX ORDER — HYDROCODONE BITARTRATE AND ACETAMINOPHEN 5; 325 MG/1; MG/1
1 TABLET ORAL 2 TIMES DAILY PRN
Qty: 60 TABLET | Refills: 0 | OUTPATIENT
Start: 2025-05-22

## 2025-05-22 RX ORDER — HYDROCODONE BITARTRATE AND ACETAMINOPHEN 5; 325 MG/1; MG/1
1 TABLET ORAL 2 TIMES DAILY PRN
Qty: 60 TABLET | Refills: 0 | Status: CANCELLED | OUTPATIENT
Start: 2025-05-22

## 2025-05-22 NOTE — ASSESSMENT & PLAN NOTE
Continue with hydrocodone as needed without taking pain medication patient is having difficulty with day-to-day activities

## 2025-05-22 NOTE — ASSESSMENT & PLAN NOTE
Patient is on levothyroxine 100 mcg daily continue the same with repeat labs which has been ordered  Orders:  •  TSH, 3rd generation with Free T4 reflex; Future

## 2025-05-22 NOTE — ASSESSMENT & PLAN NOTE
Discussed with patient regarding diet exercise lifestyle modification cutting back on calorie and carbohydrate intake

## 2025-05-22 NOTE — ASSESSMENT & PLAN NOTE
Patient is being followed by the pain management physician had received injections of steroid in the past currently taking Norco for pain management without taking medication patient is having hard time moving around.  Will continue with the same

## 2025-05-22 NOTE — ASSESSMENT & PLAN NOTE
Patient with chronic pain syndrome on [place].  Still has the pain in spite of taking the epidural injection.  Will continue with the Norco for now.

## 2025-05-22 NOTE — ASSESSMENT & PLAN NOTE
Currently patient is taking Protonix 40 mg daily we will continue the same recommend patient not to eat late in the night and keep the head end of the bed up

## 2025-06-11 ENCOUNTER — CONSULT (OUTPATIENT)
Dept: PAIN MEDICINE | Facility: CLINIC | Age: 75
End: 2025-06-11
Payer: MEDICARE

## 2025-06-11 DIAGNOSIS — M54.16 LUMBAR RADICULOPATHY: ICD-10-CM

## 2025-06-11 DIAGNOSIS — M79.7 FIBROMYALGIA: ICD-10-CM

## 2025-06-11 DIAGNOSIS — F11.20 OPIOID DEPENDENCE, UNCOMPLICATED (HCC): ICD-10-CM

## 2025-06-11 DIAGNOSIS — M79.18 MYOFASCIAL PAIN SYNDROME: Primary | ICD-10-CM

## 2025-06-11 DIAGNOSIS — M47.816 LUMBAR SPONDYLOSIS: ICD-10-CM

## 2025-06-11 DIAGNOSIS — G89.4 CHRONIC PAIN SYNDROME: ICD-10-CM

## 2025-06-11 PROCEDURE — 99204 OFFICE O/P NEW MOD 45 MIN: CPT | Performed by: STUDENT IN AN ORGANIZED HEALTH CARE EDUCATION/TRAINING PROGRAM

## 2025-06-11 PROCEDURE — 20552 NJX 1/MLT TRIGGER POINT 1/2: CPT | Performed by: STUDENT IN AN ORGANIZED HEALTH CARE EDUCATION/TRAINING PROGRAM

## 2025-06-11 RX ORDER — LIDOCAINE HYDROCHLORIDE 10 MG/ML
5 INJECTION, SOLUTION INFILTRATION; PERINEURAL ONCE
Status: COMPLETED | OUTPATIENT
Start: 2025-06-11 | End: 2025-06-12

## 2025-06-11 RX ORDER — METHYLPREDNISOLONE ACETATE 40 MG/ML
40 INJECTION, SUSPENSION INTRA-ARTICULAR; INTRALESIONAL; INTRAMUSCULAR; SOFT TISSUE ONCE
Status: COMPLETED | OUTPATIENT
Start: 2025-06-11 | End: 2025-06-12
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
## 2025-06-12 RX ADMIN — LIDOCAINE HYDROCHLORIDE 5 ML: 10 INJECTION, SOLUTION INFILTRATION; PERINEURAL at 13:29

## 2025-06-12 RX ADMIN — METHYLPREDNISOLONE ACETATE 40 MG: 40 INJECTION, SUSPENSION INTRA-ARTICULAR; INTRALESIONAL; INTRAMUSCULAR; SOFT TISSUE at 13:29

## 2025-06-18 DIAGNOSIS — G89.4 CHRONIC PAIN SYNDROME: ICD-10-CM

## 2025-06-18 RX ORDER — HYDROCODONE BITARTRATE AND ACETAMINOPHEN 5; 325 MG/1; MG/1
1 TABLET ORAL 2 TIMES DAILY PRN
Qty: 30 TABLET | Refills: 0 | Status: SHIPPED | OUTPATIENT
Start: 2025-06-18 | End: 2025-06-24 | Stop reason: SDUPTHER

## 2025-06-23 NOTE — PATIENT INSTRUCTIONS
Medicare Preventive Visit Patient Instructions  Thank you for completing your Welcome to Medicare Visit or Medicare Annual Wellness Visit today. Your next wellness visit will be due in one year (6/24/2026).  The screening/preventive services that you may require over the next 5-10 years are detailed below. Some tests may not apply to you based off risk factors and/or age. Screening tests ordered at today's visit but not completed yet may show as past due. Also, please note that scanned in results may not display below.  Preventive Screenings:  Service Recommendations Previous Testing/Comments   Colorectal Cancer Screening  * Colonoscopy    * Fecal Occult Blood Test (FOBT)/Fecal Immunochemical Test (FIT)  * Fecal DNA/Cologuard Test  * Flexible Sigmoidoscopy Age: 45-75 years old   Colonoscopy: every 10 years (may be performed more frequently if at higher risk)  OR  FOBT/FIT: every 1 year  OR  Cologuard: every 3 years  OR  Sigmoidoscopy: every 5 years  Screening may be recommended earlier than age 45 if at higher risk for colorectal cancer. Also, an individualized decision between you and your healthcare provider will decide whether screening between the ages of 76-85 would be appropriate. Colonoscopy: 02/06/2024  FOBT/FIT: Not on file  Cologuard: Not on file  Sigmoidoscopy: Not on file          Breast Cancer Screening Age: 40+ years old  Frequency: every 1-2 years  Not required if history of left and right mastectomy Mammogram: 11/13/2014        Cervical Cancer Screening Between the ages of 21-29, pap smear recommended once every 3 years.   Between the ages of 30-65, can perform pap smear with HPV co-testing every 5 years.   Recommendations may differ for women with a history of total hysterectomy, cervical cancer, or abnormal pap smears in past. Pap Smear: Not on file        Hepatitis C Screening Once for adults born between 1945 and 1965  More frequently in patients at high risk for Hepatitis C Hep C Antibody: Not  on file        Diabetes Screening 1-2 times per year if you're at risk for diabetes or have pre-diabetes Fasting glucose: 102 mg/dL (6/18/2024)  A1C: 5.8 % (6/18/2024)      Cholesterol Screening Once every 5 years if you don't have a lipid disorder. May order more often based on risk factors. Lipid panel: 06/18/2024          Other Preventive Screenings Covered by Medicare:  Abdominal Aortic Aneurysm (AAA) Screening: covered once if your at risk. You're considered to be at risk if you have a family history of AAA.  Lung Cancer Screening: covers low dose CT scan once per year if you meet all of the following conditions: (1) Age 55-77; (2) No signs or symptoms of lung cancer; (3) Current smoker or have quit smoking within the last 15 years; (4) You have a tobacco smoking history of at least 20 pack years (packs per day multiplied by number of years you smoked); (5) You get a written order from a healthcare provider.  Glaucoma Screening: covered annually if you're considered high risk: (1) You have diabetes OR (2) Family history of glaucoma OR (3)  aged 50 and older OR (4)  American aged 65 and older  Osteoporosis Screening: covered every 2 years if you meet one of the following conditions: (1) You're estrogen deficient and at risk for osteoporosis based off medical history and other findings; (2) Have a vertebral abnormality; (3) On glucocorticoid therapy for more than 3 months; (4) Have primary hyperparathyroidism; (5) On osteoporosis medications and need to assess response to drug therapy.   Last bone density test (DXA Scan): Not on file.  HIV Screening: covered annually if you're between the age of 15-65. Also covered annually if you are younger than 15 and older than 65 with risk factors for HIV infection. For pregnant patients, it is covered up to 3 times per pregnancy.    Immunizations:  Immunization Recommendations   Influenza Vaccine Annual influenza vaccination during flu season is  recommended for all persons aged >= 6 months who do not have contraindications   Pneumococcal Vaccine   * Pneumococcal conjugate vaccine = PCV13 (Prevnar 13), PCV15 (Vaxneuvance), PCV20 (Prevnar 20)  * Pneumococcal polysaccharide vaccine = PPSV23 (Pneumovax) Adults 19-63 yo with certain risk factors or if 65+ yo  If never received any pneumonia vaccine: recommend Prevnar 20 (PCV20)  Give PCV20 if previously received 1 dose of PCV13 or PPSV23   Hepatitis B Vaccine 3 dose series if at intermediate or high risk (ex: diabetes, end stage renal disease, liver disease)   Respiratory syncytial virus (RSV) Vaccine - COVERED BY MEDICARE PART D  * RSVPreF3 (Arexvy) CDC recommends that adults 60 years of age and older may receive a single dose of RSV vaccine using shared clinical decision-making (SCDM)   Tetanus (Td) Vaccine - COST NOT COVERED BY MEDICARE PART B Following completion of primary series, a booster dose should be given every 10 years to maintain immunity against tetanus. Td may also be given as tetanus wound prophylaxis.   Tdap Vaccine - COST NOT COVERED BY MEDICARE PART B Recommended at least once for all adults. For pregnant patients, recommended with each pregnancy.   Shingles Vaccine (Shingrix) - COST NOT COVERED BY MEDICARE PART B  2 shot series recommended in those 19 years and older who have or will have weakened immune systems or those 50 years and older     Health Maintenance Due:      Topic Date Due   • Hepatitis C Screening  Never done   • Breast Cancer Screening: Mammogram  11/13/2015   • Colorectal Cancer Screening  02/04/2029     Immunizations Due:      Topic Date Due   • Pneumococcal Vaccine: 50+ Years (1 of 1 - PCV) Never done   • COVID-19 Vaccine (3 - 2024-25 season) 09/01/2024     Advance Directives   What are advance directives?  Advance directives are legal documents that state your wishes and plans for medical care. These plans are made ahead of time in case you lose your ability to make  decisions for yourself. Advance directives can apply to any medical decision, such as the treatments you want, and if you want to donate organs.   What are the types of advance directives?  There are many types of advance directives, and each state has rules about how to use them. You may choose a combination of any of the following:  Living will:  This is a written record of the treatment you want. You can also choose which treatments you do not want, which to limit, and which to stop at a certain time. This includes surgery, medicine, IV fluid, and tube feedings.   Durable power of  for healthcare (DPAHC):  This is a written record that states who you want to make healthcare choices for you when you are unable to make them for yourself. This person, called a proxy, is usually a family member or a friend. You may choose more than 1 proxy.  Do not resuscitate (DNR) order:  A DNR order is used in case your heart stops beating or you stop breathing. It is a request not to have certain forms of treatment, such as CPR. A DNR order may be included in other types of advance directives.  Medical directive:  This covers the care that you want if you are in a coma, near death, or unable to make decisions for yourself. You can list the treatments you want for each condition. Treatment may include pain medicine, surgery, blood transfusions, dialysis, IV or tube feedings, and a ventilator (breathing machine).  Values history:  This document has questions about your views, beliefs, and how you feel and think about life. This information can help others choose the care that you would choose.  Why are advance directives important?  An advance directive helps you control your care. Although spoken wishes may be used, it is better to have your wishes written down. Spoken wishes can be misunderstood, or not followed. Treatments may be given even if you do not want them. An advance directive may make it easier for your family  to make difficult choices about your care.   Weight Management   Why it is important to manage your weight:  Being overweight increases your risk of health conditions such as heart disease, high blood pressure, type 2 diabetes, and certain types of cancer. It can also increase your risk for osteoarthritis, sleep apnea, and other respiratory problems. Aim for a slow, steady weight loss. Even a small amount of weight loss can lower your risk of health problems.  How to lose weight safely:  A safe and healthy way to lose weight is to eat fewer calories and get regular exercise. You can lose up about 1 pound a week by decreasing the number of calories you eat by 500 calories each day.   Healthy meal plan for weight management:  A healthy meal plan includes a variety of foods, contains fewer calories, and helps you stay healthy. A healthy meal plan includes the following:  Eat whole-grain foods more often.  A healthy meal plan should contain fiber. Fiber is the part of grains, fruits, and vegetables that is not broken down by your body. Whole-grain foods are healthy and provide extra fiber in your diet. Some examples of whole-grain foods are whole-wheat breads and pastas, oatmeal, brown rice, and bulgur.  Eat a variety of vegetables every day.  Include dark, leafy greens such as spinach, kale, obey greens, and mustard greens. Eat yellow and orange vegetables such as carrots, sweet potatoes, and winter squash.   Eat a variety of fruits every day.  Choose fresh or canned fruit (canned in its own juice or light syrup) instead of juice. Fruit juice has very little or no fiber.  Eat low-fat dairy foods.  Drink fat-free (skim) milk or 1% milk. Eat fat-free yogurt and low-fat cottage cheese. Try low-fat cheeses such as mozzarella and other reduced-fat cheeses.  Choose meat and other protein foods that are low in fat.  Choose beans or other legumes such as split peas or lentils. Choose fish, skinless poultry (chicken or  turkey), or lean cuts of red meat (beef or pork). Before you cook meat or poultry, cut off any visible fat.   Use less fat and oil.  Try baking foods instead of frying them. Add less fat, such as margarine, sour cream, regular salad dressing and mayonnaise to foods. Eat fewer high-fat foods. Some examples of high-fat foods include french fries, doughnuts, ice cream, and cakes.  Eat fewer sweets.  Limit foods and drinks that are high in sugar. This includes candy, cookies, regular soda, and sweetened drinks.  Exercise:  Exercise at least 30 minutes per day on most days of the week. Some examples of exercise include walking, biking, dancing, and swimming. You can also fit in more physical activity by taking the stairs instead of the elevator or parking farther away from stores. Ask your healthcare provider about the best exercise plan for you.    © Copyright [employer] 2018 Information is for End User's use only and may not be sold, redistributed or otherwise used for commercial purposes. All illustrations and images included in CareNotes® are the copyrighted property of [employer] or [employer]

## 2025-06-23 NOTE — PROGRESS NOTES
Name: Maryanne Marcos      : 1950      MRN: [id number]  Encounter Provider: Foreign Simeon MD  Encounter Date: 2025   Encounter department: [facility]  :  Assessment & Plan  Chronic bilateral low back pain without sciatica  Patient with chronic bilateral low back pain recently seen by the pain management physician and had received trigger point injections with good results patient also is on Norco for pain management which will renew it.       Chronic pain syndrome  Continue Norco as needed       Acquired hypothyroidism  Continue with levothyroxine 100 mcg daily follow-up with the lab work which have already ordered       Opioid dependence, uncomplicated (HCC)  Continue on hydrocodone 1 tablet twice daily as needed.  Even though she has received trigger point injections she will still ends up taking her pain medication when it is severe because it has been hindering her day-to-day activities       Medicare annual wellness visit, subsequent           Depression Screening and Follow-up Plan: Patient was screened for depression during today's encounter. They screened negative with a PHQ-2 score of 0.      Urinary Incontinence Plan of Care: counseling topics discussed: practice Kegel (pelvic floor strengthening) exercises, use restroom every 2 hours, limiting fluid intake 3-4 hours before bed and preventing constipation.       Preventive health issues were discussed with patient, and age appropriate screening tests were ordered as noted in patient's After Visit Summary. Personalized health advice and appropriate referrals for health education or preventive services given if needed, as noted in patient's After Visit Summary.    History of Present Illness     Patient is coming here for a follow-up evaluation with regards to the symptoms related to chronic pain syndrome, history of migraine headaches, hemorrhoids, GERD, history of diverticulosis, hypothyroidism, chronic pain syndrome,  fibromyalgia and allergies.  Patient was recently seen by the pain management physician and had received trigger point injections in the back because of the persistent pain she has been experiencing.  Reviewed the reports from the pain management physician.    Medications reviewed patient is going to go for the lab work next month she was supposed to go this 1 but not done yet.  Denies any symptoms of  chest pain palpitation shortness of breath       Patient Care Team:  Foreign Simeon MD as PCP - General (Internal Medicine)    Review of Systems   Constitutional:  Negative for chills and fever.   HENT:  Negative for ear pain and sore throat.    Eyes:  Negative for pain and visual disturbance.   Respiratory:  Negative for cough and shortness of breath.    Cardiovascular:  Negative for chest pain and palpitations.   Gastrointestinal:  Negative for abdominal pain and vomiting.   Genitourinary:  Negative for dysuria and hematuria.   Musculoskeletal:  Positive for arthralgias and back pain.   Skin:  Negative for color change and rash.   Neurological:  Negative for seizures and syncope.   All other systems reviewed and are negative.    Medical History Reviewed by provider this encounter:  Tobacco  Allergies  Meds  Problems  Med Hx  Surg Hx  Fam Hx       Annual Wellness Visit Questionnaire   Maryanne is here for her Subsequent Wellness visit. Last Medicare Wellness visit information reviewed, patient interviewed, no change since last AWV. Last Medicare Wellness visit information reviewed, patient interviewed and updates made to the record today.      Health Risk Assessment:   Patient rates overall health as good. Patient feels that their physical health rating is same. Patient is satisfied with their life. Eyesight was rated as same. Hearing was rated as same. Patient feels that their emotional and mental health rating is same. Patients states they are never, rarely angry. Patient states they are often unusually  tired/fatigued. Pain experienced in the last 7 days has been a lot. Patient's pain rating has been 7/10. Patient states that she has experienced no weight loss or gain in last 6 months.     Depression Screening:   PHQ-2 Score: 0      Fall Risk Screening:   In the past year, patient has experienced: no history of falling in past year      Urinary Incontinence Screening:   Patient has leaked urine accidently in the last six months.     Home Safety:  Patient does not have trouble with stairs inside or outside of their home. Patient has working smoke alarms and has working carbon monoxide detector. Home safety hazards include: none.     Nutrition:   Current diet is Regular.     Medications:   Patient is currently taking over-the-counter supplements. OTC medications include: see medication list. Patient is able to manage medications.     Activities of Daily Living (ADLs)/Instrumental Activities of Daily Living (IADLs):   Walk and transfer into and out of bed and chair?: Yes  Dress and groom yourself?: Yes    Bathe or shower yourself?: Yes    Feed yourself? Yes  Do your laundry/housekeeping?: Yes  Manage your money, pay your bills and track your expenses?: Yes  Make your own meals?: Yes    Do your own shopping?: Yes    Previous Hospitalizations:   Any hospitalizations or ED visits within the last 12 months?: No      Advance Care Planning:   Living will: No    Durable POA for healthcare: No    Advanced directive: Yes    Advanced directive counseling given: Yes    ACP document given: Yes    Patient declined ACP directive: Yes    End of Life Decisions reviewed with patient: Yes    Provider agrees with end of life decisions: Yes      Comments: Patient has been provided with papers related to living will, DURABLE POWER OF  for healthcare, advanced directives she is going to discuss with her family and get back to us after signing those papers.    Cognitive Screening:   Provider or family/friend/caregiver concerned  regarding cognition?: No    Preventive Screenings      Cardiovascular Screening:    General: Risks and Benefits Discussed    Due for: Lipid Panel      Diabetes Screening:     General: Risks and Benefits Discussed    Due for: Blood Glucose      Colorectal Cancer Screening:     General: Screening Current      Breast Cancer Screening:     General: Patient Declines      Cervical Cancer Screening:    General: Screening Not Indicated      Osteoporosis Screening:    General: History Osteoporosis and Patient Declines      Abdominal Aortic Aneurysm (AAA) Screening:        General: Screening Not Indicated      Lung Cancer Screening:     General: Screening Not Indicated      Hepatitis C Screening:    General: Patient Declines    Immunizations:  - Immunizations due: Prevnar 20 and Zoster (Shingrix)    Screening, Brief Intervention, and Referral to Treatment (SBIRT)     Screening  Typical number of drinks in a day: 0  Typical number of drinks in a week: 0  Interpretation: Low risk drinking behavior.    AUDIT-C Screenin) How often did you have a drink containing alcohol in the past year? never  2) How many drinks did you have on a typical day when you were drinking in the past year? 0  3) How often did you have 6 or more drinks on one occasion in the past year? never    AUDIT-C Score: 0  Interpretation: Score 0-2 (female): Negative screen for alcohol misuse    Single Item Drug Screening:  How often have you used an illegal drug (including marijuana) or a prescription medication for non-medical reasons in the past year? never    Single Item Drug Screen Score: 0  Interpretation: Negative screen for possible drug use disorder    Review of Current Opioid Use    Opioid Risk Tool (ORT) Interpretation: Complete Opioid Risk Tool (ORT)    Other Counseling Topics:   Car/seat belt/driving safety and calcium and vitamin D intake.     Social Drivers of Health     Financial Resource Strain: Low Risk  (2023)    Overall Financial  "Resource Strain (CARDIA)    • Difficulty of Paying Living Expenses: Not hard at all   Recent Concern: Financial Resource Strain - Medium Risk (5/1/2023)    Overall Financial Resource Strain (CARDIA)    • Difficulty of Paying Living Expenses: Somewhat hard   Food Insecurity: No Food Insecurity (6/19/2025)    Nursing - Inadequate Food Risk Classification    • Worried About Running Out of Food in the Last Year: Never true    • Ran Out of Food in the Last Year: Never true   Transportation Needs: No Transportation Needs (6/19/2025)    PRAPARE - Transportation    • Lack of Transportation (Medical): No    • Lack of Transportation (Non-Medical): No   Housing Stability: Low Risk  (6/19/2025)    Housing Stability Vital Sign    • Unable to Pay for Housing in the Last Year: No    • Number of Times Moved in the Last Year: 0    • Homeless in the Last Year: No   Utilities: Not At Risk (6/19/2025)    [facility] Utilities    • Threatened with loss of utilities: No     No results found.    Objective   /72 (BP Location: Right arm, Patient Position: Sitting, Cuff Size: Large)   Pulse 71   Ht 5' 5\" (1.651 m)   Wt 100 kg (221 lb 6.4 oz)   SpO2 95%   BMI 36.84 kg/m²     Physical Exam  Vitals and nursing note reviewed.   Constitutional:       General: She is not in acute distress.     Appearance: She is well-developed.   HENT:      Head: Normocephalic and atraumatic.     Eyes:      Conjunctiva/sclera: Conjunctivae normal.       Cardiovascular:      Rate and Rhythm: Normal rate and regular rhythm.      Heart sounds: No murmur heard.  Pulmonary:      Effort: Pulmonary effort is normal. No respiratory distress.      Breath sounds: Normal breath sounds.   Abdominal:      Palpations: Abdomen is soft.      Tenderness: There is no abdominal tenderness.     Musculoskeletal:         General: No swelling.      Cervical back: Neck supple.     Skin:     General: Skin is warm and dry.      Capillary Refill: Capillary refill takes less than 2 " seconds.     Neurological:      Mental Status: She is alert and oriented to person, place, and time.     Psychiatric:         Mood and Affect: Mood normal.         Behavior: Behavior normal.

## 2025-06-24 ENCOUNTER — OFFICE VISIT (OUTPATIENT)
Dept: FAMILY MEDICINE CLINIC | Facility: CLINIC | Age: 75
End: 2025-06-24
Payer: MEDICARE

## 2025-06-24 VITALS
BODY MASS INDEX: 36.89 KG/M2 | HEIGHT: 65 IN | HEART RATE: 71 BPM | DIASTOLIC BLOOD PRESSURE: 72 MMHG | WEIGHT: 221.4 LBS | OXYGEN SATURATION: 95 % | SYSTOLIC BLOOD PRESSURE: 120 MMHG

## 2025-06-24 DIAGNOSIS — M54.50 CHRONIC BILATERAL LOW BACK PAIN WITHOUT SCIATICA: Primary | ICD-10-CM

## 2025-06-24 DIAGNOSIS — G89.4 CHRONIC PAIN SYNDROME: ICD-10-CM

## 2025-06-24 DIAGNOSIS — Z00.00 MEDICARE ANNUAL WELLNESS VISIT, SUBSEQUENT: ICD-10-CM

## 2025-06-24 DIAGNOSIS — E03.9 ACQUIRED HYPOTHYROIDISM: ICD-10-CM

## 2025-06-24 DIAGNOSIS — F11.20 OPIOID DEPENDENCE, UNCOMPLICATED (HCC): ICD-10-CM

## 2025-06-24 DIAGNOSIS — G89.29 CHRONIC BILATERAL LOW BACK PAIN WITHOUT SCIATICA: Primary | ICD-10-CM

## 2025-06-24 PROCEDURE — G0439 PPPS, SUBSEQ VISIT: HCPCS | Performed by: INTERNAL MEDICINE

## 2025-06-24 PROCEDURE — 99213 OFFICE O/P EST LOW 20 MIN: CPT | Performed by: INTERNAL MEDICINE

## 2025-06-24 PROCEDURE — G2211 COMPLEX E/M VISIT ADD ON: HCPCS | Performed by: INTERNAL MEDICINE

## 2025-06-24 RX ORDER — HYDROCODONE BITARTRATE AND ACETAMINOPHEN 5; 325 MG/1; MG/1
1 TABLET ORAL 2 TIMES DAILY PRN
Qty: 60 TABLET | Refills: 0 | Status: SHIPPED | OUTPATIENT
Start: 2025-06-24

## 2025-06-24 NOTE — ASSESSMENT & PLAN NOTE
Continue with levothyroxine 100 mcg daily follow-up with the lab work which have already ordered

## 2025-06-24 NOTE — ASSESSMENT & PLAN NOTE
Continue on hydrocodone 1 tablet twice daily as needed.  Even though she has received trigger point injections she will still ends up taking her pain medication when it is severe because it has been hindering her day-to-day activities

## 2025-06-24 NOTE — ASSESSMENT & PLAN NOTE
Patient with chronic bilateral low back pain recently seen by the pain management physician and had received trigger point injections with good results patient also is on Norco for pain management which will renew it.

## 2025-07-15 ENCOUNTER — APPOINTMENT (OUTPATIENT)
Dept: LAB | Facility: HOSPITAL | Age: 75
End: 2025-07-15
Attending: INTERNAL MEDICINE
Payer: MEDICARE

## 2025-07-15 DIAGNOSIS — Z13.0 SCREENING FOR DEFICIENCY ANEMIA: ICD-10-CM

## 2025-07-15 DIAGNOSIS — E03.9 ACQUIRED HYPOTHYROIDISM: ICD-10-CM

## 2025-07-15 DIAGNOSIS — E78.5 DYSLIPIDEMIA: ICD-10-CM

## 2025-07-15 DIAGNOSIS — R73.03 PRE-DIABETES: ICD-10-CM

## 2025-07-15 LAB
ALBUMIN SERPL BCG-MCNC: 4 G/DL (ref 3.5–5)
ALP SERPL-CCNC: 45 U/L (ref 34–104)
ALT SERPL W P-5'-P-CCNC: 15 U/L (ref 7–52)
ANION GAP SERPL CALCULATED.3IONS-SCNC: 7 MMOL/L (ref 4–13)
AST SERPL W P-5'-P-CCNC: 16 U/L (ref 13–39)
BACTERIA UR QL AUTO: ABNORMAL /HPF
BASOPHILS # BLD AUTO: 0.03 THOUSANDS/ÂΜL (ref 0–0.1)
BASOPHILS NFR BLD AUTO: 1 % (ref 0–1)
BILIRUB SERPL-MCNC: 0.56 MG/DL (ref 0.2–1)
BILIRUB UR QL STRIP: NEGATIVE
BUN SERPL-MCNC: 16 MG/DL (ref 5–25)
CALCIUM SERPL-MCNC: 9.5 MG/DL (ref 8.4–10.2)
CHLORIDE SERPL-SCNC: 103 MMOL/L (ref 96–108)
CHOLEST SERPL-MCNC: 184 MG/DL (ref ?–200)
CLARITY UR: ABNORMAL
CO2 SERPL-SCNC: 29 MMOL/L (ref 21–32)
COLOR UR: YELLOW
CREAT SERPL-MCNC: 1.02 MG/DL (ref 0.6–1.3)
EOSINOPHIL # BLD AUTO: 0.1 THOUSAND/ÂΜL (ref 0–0.61)
EOSINOPHIL NFR BLD AUTO: 2 % (ref 0–6)
ERYTHROCYTE [DISTWIDTH] IN BLOOD BY AUTOMATED COUNT: 13.5 % (ref 11.6–15.1)
EST. AVERAGE GLUCOSE BLD GHB EST-MCNC: 123 MG/DL
GFR SERPL CREATININE-BSD FRML MDRD: 54 ML/MIN/1.73SQ M
GLUCOSE P FAST SERPL-MCNC: 93 MG/DL (ref 65–99)
GLUCOSE UR STRIP-MCNC: NEGATIVE MG/DL
HBA1C MFR BLD: 5.9 %
HCT VFR BLD AUTO: 44.4 % (ref 34.8–46.1)
HDLC SERPL-MCNC: 57 MG/DL
HGB BLD-MCNC: 14.6 G/DL (ref 11.5–15.4)
HGB UR QL STRIP.AUTO: ABNORMAL
IMM GRANULOCYTES # BLD AUTO: 0.01 THOUSAND/UL (ref 0–0.2)
IMM GRANULOCYTES NFR BLD AUTO: 0 % (ref 0–2)
KETONES UR STRIP-MCNC: NEGATIVE MG/DL
LDLC SERPL CALC-MCNC: 111 MG/DL (ref 0–100)
LEUKOCYTE ESTERASE UR QL STRIP: ABNORMAL
LYMPHOCYTES # BLD AUTO: 1.59 THOUSANDS/ÂΜL (ref 0.6–4.47)
LYMPHOCYTES NFR BLD AUTO: 27 % (ref 14–44)
MCH RBC QN AUTO: 30.4 PG (ref 26.8–34.3)
MCHC RBC AUTO-ENTMCNC: 32.9 G/DL (ref 31.4–37.4)
MCV RBC AUTO: 93 FL (ref 82–98)
MONOCYTES # BLD AUTO: 0.58 THOUSAND/ÂΜL (ref 0.17–1.22)
MONOCYTES NFR BLD AUTO: 10 % (ref 4–12)
NEUTROPHILS # BLD AUTO: 3.69 THOUSANDS/ÂΜL (ref 1.85–7.62)
NEUTS SEG NFR BLD AUTO: 60 % (ref 43–75)
NITRITE UR QL STRIP: NEGATIVE
NON-SQ EPI CELLS URNS QL MICRO: ABNORMAL /HPF
NONHDLC SERPL-MCNC: 127 MG/DL
NRBC BLD AUTO-RTO: 0 /100 WBCS
PH UR STRIP.AUTO: 6 [PH]
PLATELET # BLD AUTO: 236 THOUSANDS/UL (ref 149–390)
PMV BLD AUTO: 12 FL (ref 8.9–12.7)
POTASSIUM SERPL-SCNC: 3.8 MMOL/L (ref 3.5–5.3)
PROT SERPL-MCNC: 7 G/DL (ref 6.4–8.4)
PROT UR STRIP-MCNC: NEGATIVE MG/DL
RBC # BLD AUTO: 4.8 MILLION/UL (ref 3.81–5.12)
RBC #/AREA URNS AUTO: ABNORMAL /HPF
SODIUM SERPL-SCNC: 139 MMOL/L (ref 135–147)
SP GR UR STRIP.AUTO: 1.01 (ref 1–1.03)
T4 FREE SERPL-MCNC: 1.54 NG/DL (ref 0.61–1.12)
TRIGL SERPL-MCNC: 78 MG/DL (ref ?–150)
TSH SERPL DL<=0.05 MIU/L-ACNC: 0.17 UIU/ML (ref 0.45–4.5)
UROBILINOGEN UR STRIP-ACNC: <2 MG/DL
WBC # BLD AUTO: 6 THOUSAND/UL (ref 4.31–10.16)
WBC #/AREA URNS AUTO: ABNORMAL /HPF

## 2025-07-15 PROCEDURE — 85025 COMPLETE CBC W/AUTO DIFF WBC: CPT

## 2025-07-15 PROCEDURE — 80061 LIPID PANEL: CPT

## 2025-07-15 PROCEDURE — 84443 ASSAY THYROID STIM HORMONE: CPT

## 2025-07-15 PROCEDURE — 87086 URINE CULTURE/COLONY COUNT: CPT

## 2025-07-15 PROCEDURE — 83036 HEMOGLOBIN GLYCOSYLATED A1C: CPT

## 2025-07-15 PROCEDURE — 36415 COLL VENOUS BLD VENIPUNCTURE: CPT

## 2025-07-15 PROCEDURE — 81001 URINALYSIS AUTO W/SCOPE: CPT

## 2025-07-15 PROCEDURE — 84439 ASSAY OF FREE THYROXINE: CPT

## 2025-07-15 PROCEDURE — 80053 COMPREHEN METABOLIC PANEL: CPT

## 2025-07-16 DIAGNOSIS — K21.9 GASTROESOPHAGEAL REFLUX DISEASE WITHOUT ESOPHAGITIS: ICD-10-CM

## 2025-07-16 DIAGNOSIS — T78.40XD ALLERGY, SUBSEQUENT ENCOUNTER: ICD-10-CM

## 2025-07-16 DIAGNOSIS — G40.909 SEIZURE DISORDER (HCC): ICD-10-CM

## 2025-07-16 DIAGNOSIS — F41.9 ANXIETY: ICD-10-CM

## 2025-07-16 DIAGNOSIS — E03.9 ACQUIRED HYPOTHYROIDISM: ICD-10-CM

## 2025-07-17 LAB — BACTERIA UR CULT: NORMAL

## 2025-07-17 RX ORDER — LEVOTHYROXINE SODIUM 100 UG/1
100 TABLET ORAL EVERY MORNING
Qty: 90 TABLET | Refills: 1 | Status: SHIPPED | OUTPATIENT
Start: 2025-07-17

## 2025-07-17 RX ORDER — DULOXETIN HYDROCHLORIDE 30 MG/1
30 CAPSULE, DELAYED RELEASE ORAL EVERY MORNING
Qty: 90 CAPSULE | Refills: 1 | Status: SHIPPED | OUTPATIENT
Start: 2025-07-17

## 2025-07-17 RX ORDER — MONTELUKAST SODIUM 10 MG/1
10 TABLET ORAL EVERY MORNING
Qty: 90 TABLET | Refills: 1 | Status: SHIPPED | OUTPATIENT
Start: 2025-07-17

## 2025-07-17 RX ORDER — PANTOPRAZOLE SODIUM 40 MG/1
40 TABLET, DELAYED RELEASE ORAL DAILY
Qty: 90 TABLET | Refills: 1 | Status: SHIPPED | OUTPATIENT
Start: 2025-07-17

## 2025-07-17 RX ORDER — LEVETIRACETAM 500 MG/1
500 TABLET ORAL 2 TIMES DAILY
Qty: 180 TABLET | Refills: 1 | Status: SHIPPED | OUTPATIENT
Start: 2025-07-17

## 2025-07-24 ENCOUNTER — OFFICE VISIT (OUTPATIENT)
Age: 75
End: 2025-07-24
Payer: MEDICARE

## 2025-07-24 VITALS
OXYGEN SATURATION: 95 % | HEIGHT: 65 IN | WEIGHT: 221.2 LBS | DIASTOLIC BLOOD PRESSURE: 70 MMHG | HEART RATE: 70 BPM | SYSTOLIC BLOOD PRESSURE: 120 MMHG | BODY MASS INDEX: 36.85 KG/M2

## 2025-07-24 DIAGNOSIS — E66.9 OBESITY (BMI 35.0-39.9 WITHOUT COMORBIDITY): ICD-10-CM

## 2025-07-24 DIAGNOSIS — T78.40XD ALLERGY, SUBSEQUENT ENCOUNTER: ICD-10-CM

## 2025-07-24 DIAGNOSIS — K22.70 BARRETT'S ESOPHAGUS WITHOUT DYSPLASIA: ICD-10-CM

## 2025-07-24 DIAGNOSIS — R73.03 PREDIABETES: Primary | ICD-10-CM

## 2025-07-24 DIAGNOSIS — M79.7 FIBROMYALGIA: ICD-10-CM

## 2025-07-24 DIAGNOSIS — G43.909 MIGRAINE WITHOUT STATUS MIGRAINOSUS, NOT INTRACTABLE, UNSPECIFIED MIGRAINE TYPE: ICD-10-CM

## 2025-07-24 DIAGNOSIS — K21.9 GASTROESOPHAGEAL REFLUX DISEASE WITHOUT ESOPHAGITIS: ICD-10-CM

## 2025-07-24 DIAGNOSIS — E03.9 ACQUIRED HYPOTHYROIDISM: ICD-10-CM

## 2025-07-24 DIAGNOSIS — M54.50 CHRONIC BILATERAL LOW BACK PAIN WITHOUT SCIATICA: ICD-10-CM

## 2025-07-24 DIAGNOSIS — G89.4 CHRONIC PAIN SYNDROME: ICD-10-CM

## 2025-07-24 DIAGNOSIS — G40.909 SEIZURE DISORDER (HCC): ICD-10-CM

## 2025-07-24 DIAGNOSIS — G89.29 CHRONIC BILATERAL LOW BACK PAIN WITHOUT SCIATICA: ICD-10-CM

## 2025-07-24 DIAGNOSIS — F11.20 OPIOID DEPENDENCE, UNCOMPLICATED (HCC): ICD-10-CM

## 2025-07-24 PROCEDURE — G2211 COMPLEX E/M VISIT ADD ON: HCPCS | Performed by: INTERNAL MEDICINE

## 2025-07-24 PROCEDURE — 99214 OFFICE O/P EST MOD 30 MIN: CPT | Performed by: INTERNAL MEDICINE

## 2025-07-24 RX ORDER — IPRATROPIUM BROMIDE 21 UG/1
2 SPRAY, METERED NASAL EVERY 12 HOURS
Qty: 90 ML | Refills: 1 | Status: SHIPPED | OUTPATIENT
Start: 2025-07-24

## 2025-07-24 NOTE — ASSESSMENT & PLAN NOTE
Labs done on July 15 Free T4 is 1.54 TSH level is low at 0.172 prior to that it was 2.157 patient has been on same dose of 100 mcg of levothyroxine for a long period of time, we will repeat it if it is still showing on the low side we will reduce the dosage to 88 mcg daily discussed with patient.  Orders:  •  TSH, 3rd generation with Free T4 reflex; Future

## 2025-07-24 NOTE — ASSESSMENT & PLAN NOTE
Patient with chronic low back pain had received trigger injections in the back by the pain management physician still ended up needing the Norco pain medication which I will renew it discussed with patient regarding dependence

## 2025-07-24 NOTE — ASSESSMENT & PLAN NOTE
Patient is on Protonix 40 mg daily continue the same recommend patient not to eat late in the night keep the head end of the bed up as much as possible while sleeping

## 2025-07-24 NOTE — ASSESSMENT & PLAN NOTE
Continue with montelukast 10 mg daily along with Atrovent nasal spray  Orders:  •  ipratropium (ATROVENT) 0.03 % nasal spray; 2 sprays into each nostril every 12 (twelve) hours

## 2025-07-24 NOTE — ASSESSMENT & PLAN NOTE
Patient's hemoglobin A1c came back at 5.9 no family history of diabetes patient does admit to eating sweets recommend very strongly to cut back on carbohydrate intake sweets intake cutting back calories.

## 2025-07-24 NOTE — ASSESSMENT & PLAN NOTE
On hydrocodone twice daily as needed emphasized regarding trying to cut it down as much as possible

## 2025-07-24 NOTE — PROGRESS NOTES
Name: Maryanne Marcos      : 1950      MRN: [id number]  Encounter Provider: Foreign Simeon MD  Encounter Date: 2025   Encounter department: [facility] [place]  :  Assessment & Plan  Prediabetes  Patient's hemoglobin A1c came back at 5.9 no family history of diabetes patient does admit to eating sweets recommend very strongly to cut back on carbohydrate intake sweets intake cutting back calories.       Acquired hypothyroidism  Labs done on July 15 Free T4 is 1.54 TSH level is low at 0.172 prior to that it was 2.157 patient has been on same dose of 100 mcg of levothyroxine for a long period of time, we will repeat it if it is still showing on the low side we will reduce the dosage to 88 mcg daily discussed with patient.  Orders:  •  TSH, 3rd generation with Free T4 reflex; Future    Chronic bilateral low back pain without sciatica  Patient with chronic low back pain had received trigger injections in the back by the pain management physician still ended up needing the Norco pain medication which I will renew it discussed with patient regarding dependence       Allergy, subsequent encounter  Continue with montelukast 10 mg daily along with Atrovent nasal spray  Orders:  •  ipratropium (ATROVENT) 0.03 % nasal spray; 2 sprays into each nostril every 12 (twelve) hours    Carson's esophagus without dysplasia  Patient is on Protonix 40 mg daily continue the same recommend patient not to eat late in the night keep the head end of the bed up as much as possible while sleeping       Chronic pain syndrome  Mentioned above patient is on Norco as needed for severe pain       Fibromyalgia  Patient is on Cymbalta 30 mg daily continue       Gastroesophageal reflux disease without esophagitis  On pantoprazole continue       Obesity (BMI 35.0-39.9 without comorbidity)      Emphasized regarding diet exercise lifestyle modification cutting back calorie intake carbohydrate intake and lose weight       Opioid  "dependence, uncomplicated (HCC)  On hydrocodone twice daily as needed emphasized regarding trying to cut it down as much as possible       Seizure disorder (HCC)  Continue with Keppra no episodes of seizure recently       Migraine without status migrainosus, not intractable, unspecified migraine type      Stable              History of Present Illness   Patient patient is coming here for a follow-up evaluation with regard to chronic pain syndrome low back pain followed by the spine and pain management physician.  Denies any symptoms of chest pain palpitation shortness of breath.    Patient had lab work done showed mild decrease in the TSH.  Patient has been on the same dose for a long period of time 100 mcg daily    Hemoglobin A1c came up at 5.9 it was 5.8 before and her LDL levels also went up slightly.    Medications reviewed      Review of Systems   Constitutional:  Negative for chills and fever.   HENT:  Negative for ear pain and sore throat.    Eyes:  Negative for pain and visual disturbance.   Respiratory:  Negative for cough and shortness of breath.    Cardiovascular:  Negative for chest pain and palpitations.   Gastrointestinal:  Negative for abdominal pain and vomiting.   Genitourinary:  Negative for dysuria and hematuria.   Musculoskeletal:  Negative for arthralgias and back pain.   Skin:  Negative for color change and rash.   Neurological:  Negative for seizures and syncope.   All other systems reviewed and are negative.      Objective   /70 (BP Location: Right arm, Patient Position: Sitting, Cuff Size: Large)   Pulse 70   Ht 5' 5\" (1.651 m)   Wt 100 kg (221 lb 3.2 oz)   SpO2 95%   BMI 36.81 kg/m²      Physical Exam  Vitals and nursing note reviewed.   Constitutional:       General: She is not in acute distress.     Appearance: She is well-developed.   HENT:      Head: Normocephalic and atraumatic.     Eyes:      Conjunctiva/sclera: Conjunctivae normal.       Cardiovascular:      Rate and " Rhythm: Normal rate and regular rhythm.      Heart sounds: No murmur heard.  Pulmonary:      Effort: Pulmonary effort is normal. No respiratory distress.      Breath sounds: Normal breath sounds.   Abdominal:      Palpations: Abdomen is soft.      Tenderness: There is no abdominal tenderness.     Musculoskeletal:         General: No swelling.      Cervical back: Neck supple.     Skin:     General: Skin is warm and dry.      Capillary Refill: Capillary refill takes less than 2 seconds.     Neurological:      Mental Status: She is alert and oriented to person, place, and time.     Psychiatric:         Mood and Affect: Mood normal.         Behavior: Behavior normal.       Recent Results (from the past 8 weeks)   CBC and differential    Collection Time: 07/15/25 12:22 PM   Result Value Ref Range    WBC 6.00 4.31 - 10.16 Thousand/uL    RBC 4.80 3.81 - 5.12 Million/uL    Hemoglobin 14.6 11.5 - 15.4 g/dL    Hematocrit 44.4 34.8 - 46.1 %    MCV 93 82 - 98 fL    MCH 30.4 26.8 - 34.3 pg    MCHC 32.9 31.4 - 37.4 g/dL    RDW 13.5 11.6 - 15.1 %    MPV 12.0 8.9 - 12.7 fL    Platelets 236 149 - 390 Thousands/uL    nRBC 0 /100 WBCs    Segmented % 60 43 - 75 %    Immature Grans % 0 0 - 2 %    Lymphocytes % 27 14 - 44 %    Monocytes % 10 4 - 12 %    Eosinophils Relative 2 0 - 6 %    Basophils Relative 1 0 - 1 %    Absolute Neutrophils 3.69 1.85 - 7.62 Thousands/µL    Absolute Immature Grans 0.01 0.00 - 0.20 Thousand/uL    Absolute Lymphocytes 1.59 0.60 - 4.47 Thousands/µL    Absolute Monocytes 0.58 0.17 - 1.22 Thousand/µL    Eosinophils Absolute 0.10 0.00 - 0.61 Thousand/µL    Basophils Absolute 0.03 0.00 - 0.10 Thousands/µL   Comprehensive metabolic panel    Collection Time: 07/15/25 12:22 PM   Result Value Ref Range    Sodium 139 135 - 147 mmol/L    Potassium 3.8 3.5 - 5.3 mmol/L    Chloride 103 96 - 108 mmol/L    CO2 29 21 - 32 mmol/L    ANION GAP 7 4 - 13 mmol/L    BUN 16 5 - 25 mg/dL    Creatinine 1.02 0.60 - 1.30 mg/dL     Glucose, Fasting 93 65 - 99 mg/dL    Calcium 9.5 8.4 - 10.2 mg/dL    AST 16 13 - 39 U/L    ALT 15 7 - 52 U/L    Alkaline Phosphatase 45 34 - 104 U/L    Total Protein 7.0 6.4 - 8.4 g/dL    Albumin 4.0 3.5 - 5.0 g/dL    Total Bilirubin 0.56 0.20 - 1.00 mg/dL    eGFR 54 ml/min/1.73sq m   Hemoglobin A1C    Collection Time: 07/15/25 12:22 PM   Result Value Ref Range    Hemoglobin A1C 5.9 (H) Normal 4.0-5.6%; PreDiabetic 5.7-6.4%; Diabetic >=6.5%; Glycemic control for adults with diabetes <7.0% %     mg/dl   Lipid panel    Collection Time: 07/15/25 12:22 PM   Result Value Ref Range    Cholesterol 184 See Comment mg/dL    Triglycerides 78 See Comment mg/dL    HDL, Direct 57 >=50 mg/dL    LDL Calculated 111 (H) 0 - 100 mg/dL    Non-HDL-Chol (CHOL-HDL) 127 mg/dl   TSH, 3rd generation with Free T4 reflex    Collection Time: 07/15/25 12:22 PM   Result Value Ref Range    TSH 3RD GENERATION 0.172 (L) 0.450 - 4.500 uIU/mL   UA w Reflex to Microscopic w Reflex to Culture    Collection Time: 07/15/25 12:22 PM    Specimen: Urine, Clean Catch   Result Value Ref Range    Color, UA Yellow     Clarity, UA Cloudy     Specific [place], UA 1.015 1.005 - 1.030    pH, UA 6.0 4.5, 5.0, 5.5, 6.0, 6.5, 7.0, 7.5, 8.0    Leukocytes, UA Large (A) Negative    Nitrite, UA Negative Negative    Protein, UA Negative Negative mg/dl    Glucose, UA Negative Negative mg/dl    Ketones, UA Negative Negative mg/dl    Urobilinogen, UA <2.0 <2.0 mg/dl mg/dl    Bilirubin, UA Negative Negative    Occult Blood, UA Small (A) Negative   Urine Microscopic    Collection Time: 07/15/25 12:22 PM   Result Value Ref Range    RBC, UA 2-4 None Seen, 0-1, 1-2, 2-4, 0-5 /hpf    WBC, UA Innumerable (A) None Seen, 0-1, 1-2, 0-5, 2-4 /hpf    Epithelial Cells None Seen None Seen, Occasional /hpf    Bacteria, UA Occasional None Seen, Occasional /hpf   Urine culture    Collection Time: 07/15/25 12:22 PM    Specimen: Urine, Clean Catch   Result Value Ref Range    Urine  Culture >100,000 cfu/ml    T4, free    Collection Time: 07/15/25 12:22 PM   Result Value Ref Range    Free T4 1.54 (H) 0.61 - 1.12 ng/dL

## 2025-08-05 DIAGNOSIS — G89.4 CHRONIC PAIN SYNDROME: ICD-10-CM

## 2025-08-05 RX ORDER — HYDROCODONE BITARTRATE AND ACETAMINOPHEN 5; 325 MG/1; MG/1
1 TABLET ORAL 2 TIMES DAILY PRN
Qty: 60 TABLET | Refills: 0 | Status: SHIPPED | OUTPATIENT
Start: 2025-08-05

## 2025-08-12 ENCOUNTER — APPOINTMENT (OUTPATIENT)
Dept: LAB | Facility: CLINIC | Age: 75
End: 2025-08-12
Attending: INTERNAL MEDICINE
Payer: MEDICARE

## 2025-08-21 ENCOUNTER — OFFICE VISIT (OUTPATIENT)
Age: 75
End: 2025-08-21
Payer: MEDICARE

## 2025-08-21 VITALS
WEIGHT: 224.1 LBS | BODY MASS INDEX: 37.34 KG/M2 | OXYGEN SATURATION: 96 % | DIASTOLIC BLOOD PRESSURE: 75 MMHG | SYSTOLIC BLOOD PRESSURE: 122 MMHG | HEART RATE: 72 BPM | HEIGHT: 65 IN

## 2025-08-21 DIAGNOSIS — E03.9 ACQUIRED HYPOTHYROIDISM: Primary | ICD-10-CM

## 2025-08-21 DIAGNOSIS — G89.4 CHRONIC PAIN SYNDROME: ICD-10-CM

## 2025-08-21 DIAGNOSIS — G89.29 CHRONIC BILATERAL LOW BACK PAIN WITHOUT SCIATICA: ICD-10-CM

## 2025-08-21 DIAGNOSIS — T78.40XD ALLERGY, SUBSEQUENT ENCOUNTER: ICD-10-CM

## 2025-08-21 DIAGNOSIS — K22.70 BARRETT'S ESOPHAGUS WITHOUT DYSPLASIA: ICD-10-CM

## 2025-08-21 DIAGNOSIS — M79.7 FIBROMYALGIA: ICD-10-CM

## 2025-08-21 DIAGNOSIS — F11.20 OPIOID DEPENDENCE, UNCOMPLICATED (HCC): ICD-10-CM

## 2025-08-21 DIAGNOSIS — E04.1 THYROID NODULE: ICD-10-CM

## 2025-08-21 DIAGNOSIS — M54.50 CHRONIC BILATERAL LOW BACK PAIN WITHOUT SCIATICA: ICD-10-CM

## 2025-08-21 DIAGNOSIS — K21.9 GASTROESOPHAGEAL REFLUX DISEASE WITHOUT ESOPHAGITIS: ICD-10-CM

## 2025-08-21 DIAGNOSIS — G40.909 SEIZURE DISORDER (HCC): ICD-10-CM

## 2025-08-21 PROCEDURE — G2211 COMPLEX E/M VISIT ADD ON: HCPCS | Performed by: INTERNAL MEDICINE

## 2025-08-21 PROCEDURE — 99214 OFFICE O/P EST MOD 30 MIN: CPT | Performed by: INTERNAL MEDICINE

## 2025-08-21 RX ORDER — LEVOTHYROXINE SODIUM 88 UG/1
88 TABLET ORAL
Qty: 100 TABLET | Refills: 0 | Status: SHIPPED | OUTPATIENT
Start: 2025-08-21

## 2025-08-21 RX ORDER — HYDROCODONE BITARTRATE AND ACETAMINOPHEN 5; 325 MG/1; MG/1
1 TABLET ORAL 2 TIMES DAILY PRN
Qty: 60 TABLET | Refills: 0 | Status: SHIPPED | OUTPATIENT
Start: 2025-08-21